# Patient Record
Sex: FEMALE | Race: WHITE | NOT HISPANIC OR LATINO | Employment: FULL TIME | URBAN - METROPOLITAN AREA
[De-identification: names, ages, dates, MRNs, and addresses within clinical notes are randomized per-mention and may not be internally consistent; named-entity substitution may affect disease eponyms.]

---

## 2017-01-04 ENCOUNTER — ALLSCRIPTS OFFICE VISIT (OUTPATIENT)
Dept: OTHER | Facility: OTHER | Age: 47
End: 2017-01-04

## 2017-04-20 ENCOUNTER — ALLSCRIPTS OFFICE VISIT (OUTPATIENT)
Dept: OTHER | Facility: OTHER | Age: 47
End: 2017-04-20

## 2017-04-20 ENCOUNTER — TRANSCRIBE ORDERS (OUTPATIENT)
Dept: ADMINISTRATIVE | Facility: HOSPITAL | Age: 47
End: 2017-04-20

## 2017-04-20 DIAGNOSIS — R07.9 CHEST PAIN: ICD-10-CM

## 2017-04-20 DIAGNOSIS — E55.9 VITAMIN D DEFICIENCY: ICD-10-CM

## 2017-04-20 DIAGNOSIS — R06.09 OTHER FORMS OF DYSPNEA: ICD-10-CM

## 2017-04-20 DIAGNOSIS — E78.5 HYPERLIPIDEMIA: ICD-10-CM

## 2017-04-20 DIAGNOSIS — R07.9 CHEST PAIN, UNSPECIFIED: Primary | ICD-10-CM

## 2017-04-20 DIAGNOSIS — R09.89 OTHER DYSPNEA AND RESPIRATORY ABNORMALITY: ICD-10-CM

## 2017-04-20 DIAGNOSIS — R06.09 OTHER DYSPNEA AND RESPIRATORY ABNORMALITY: ICD-10-CM

## 2017-04-26 ENCOUNTER — LAB REQUISITION (OUTPATIENT)
Dept: LAB | Facility: HOSPITAL | Age: 47
End: 2017-04-26
Payer: OTHER GOVERNMENT

## 2017-04-26 ENCOUNTER — LAB CONVERSION - ENCOUNTER (OUTPATIENT)
Dept: OTHER | Facility: OTHER | Age: 47
End: 2017-04-26

## 2017-04-26 DIAGNOSIS — K21.9 GASTRO-ESOPHAGEAL REFLUX DISEASE WITHOUT ESOPHAGITIS: ICD-10-CM

## 2017-04-26 PROCEDURE — 88305 TISSUE EXAM BY PATHOLOGIST: CPT | Performed by: INTERNAL MEDICINE

## 2017-05-03 ENCOUNTER — HOSPITAL ENCOUNTER (OUTPATIENT)
Dept: NON INVASIVE DIAGNOSTICS | Facility: CLINIC | Age: 47
Discharge: HOME | End: 2017-05-03
Payer: OTHER GOVERNMENT

## 2017-05-03 DIAGNOSIS — R06.09 OTHER FORMS OF DYSPNEA: ICD-10-CM

## 2017-05-03 DIAGNOSIS — R07.9 CHEST PAIN: ICD-10-CM

## 2017-05-03 PROCEDURE — 93350 STRESS TTE ONLY: CPT

## 2017-05-04 ENCOUNTER — ALLSCRIPTS OFFICE VISIT (OUTPATIENT)
Dept: OTHER | Facility: OTHER | Age: 47
End: 2017-05-04

## 2017-05-05 ENCOUNTER — ALLSCRIPTS OFFICE VISIT (OUTPATIENT)
Dept: OTHER | Facility: OTHER | Age: 47
End: 2017-05-05

## 2017-08-09 ENCOUNTER — ALLSCRIPTS OFFICE VISIT (OUTPATIENT)
Dept: OTHER | Facility: OTHER | Age: 47
End: 2017-08-09

## 2017-09-19 ENCOUNTER — GENERIC CONVERSION - ENCOUNTER (OUTPATIENT)
Dept: OTHER | Facility: OTHER | Age: 47
End: 2017-09-19

## 2018-01-02 ENCOUNTER — GENERIC CONVERSION - ENCOUNTER (OUTPATIENT)
Dept: OTHER | Facility: OTHER | Age: 48
End: 2018-01-02

## 2018-01-12 VITALS
SYSTOLIC BLOOD PRESSURE: 132 MMHG | HEIGHT: 63 IN | BODY MASS INDEX: 38.7 KG/M2 | DIASTOLIC BLOOD PRESSURE: 68 MMHG | WEIGHT: 218.38 LBS

## 2018-01-13 VITALS
TEMPERATURE: 98 F | DIASTOLIC BLOOD PRESSURE: 80 MMHG | WEIGHT: 210.13 LBS | SYSTOLIC BLOOD PRESSURE: 122 MMHG | HEIGHT: 63 IN | BODY MASS INDEX: 37.23 KG/M2

## 2018-01-13 VITALS
SYSTOLIC BLOOD PRESSURE: 124 MMHG | HEIGHT: 63 IN | BODY MASS INDEX: 37.03 KG/M2 | WEIGHT: 209 LBS | DIASTOLIC BLOOD PRESSURE: 62 MMHG

## 2018-01-13 VITALS
SYSTOLIC BLOOD PRESSURE: 120 MMHG | DIASTOLIC BLOOD PRESSURE: 70 MMHG | WEIGHT: 212.25 LBS | BODY MASS INDEX: 37.6 KG/M2 | TEMPERATURE: 98.7 F

## 2018-01-24 VITALS
TEMPERATURE: 98.6 F | HEIGHT: 63 IN | BODY MASS INDEX: 35.97 KG/M2 | DIASTOLIC BLOOD PRESSURE: 70 MMHG | SYSTOLIC BLOOD PRESSURE: 138 MMHG | WEIGHT: 203 LBS

## 2018-01-26 ENCOUNTER — OFFICE VISIT (OUTPATIENT)
Dept: FAMILY MEDICINE CLINIC | Facility: CLINIC | Age: 48
End: 2018-01-26
Payer: OTHER GOVERNMENT

## 2018-01-26 VITALS
DIASTOLIC BLOOD PRESSURE: 82 MMHG | WEIGHT: 203 LBS | BODY MASS INDEX: 35.97 KG/M2 | HEIGHT: 63 IN | TEMPERATURE: 96.7 F | SYSTOLIC BLOOD PRESSURE: 122 MMHG

## 2018-01-26 DIAGNOSIS — E78.5 HYPERLIPIDEMIA, UNSPECIFIED HYPERLIPIDEMIA TYPE: ICD-10-CM

## 2018-01-26 DIAGNOSIS — K21.9 GASTROESOPHAGEAL REFLUX DISEASE WITHOUT ESOPHAGITIS: ICD-10-CM

## 2018-01-26 DIAGNOSIS — M79.7 FIBROMYALGIA: ICD-10-CM

## 2018-01-26 DIAGNOSIS — R73.9 ELEVATED BLOOD SUGAR: Primary | ICD-10-CM

## 2018-01-26 DIAGNOSIS — R73.01 IMPAIRED FASTING GLUCOSE: ICD-10-CM

## 2018-01-26 PROBLEM — L85.3 XEROSIS OF SKIN: Status: ACTIVE | Noted: 2017-08-09

## 2018-01-26 PROBLEM — R07.9 CENTRAL CHEST PAIN: Status: ACTIVE | Noted: 2017-04-20

## 2018-01-26 PROBLEM — R06.09 DYSPNEA ON EXERTION: Status: ACTIVE | Noted: 2017-04-20

## 2018-01-26 PROBLEM — R06.00 DYSPNEA ON EXERTION: Status: ACTIVE | Noted: 2017-04-20

## 2018-01-26 LAB — SL AMB POCT HEMOGLOBIN AIC: NORMAL

## 2018-01-26 PROCEDURE — 99214 OFFICE O/P EST MOD 30 MIN: CPT | Performed by: FAMILY MEDICINE

## 2018-01-26 PROCEDURE — 83037 HB GLYCOSYLATED A1C HOME DEV: CPT | Performed by: FAMILY MEDICINE

## 2018-01-26 RX ORDER — AMMONIUM LACTATE 12 G/100G
CREAM TOPICAL 2 TIMES DAILY
COMMUNITY
Start: 2017-08-09 | End: 2020-06-24 | Stop reason: SDUPTHER

## 2018-01-26 RX ORDER — ALPRAZOLAM 0.25 MG/1
TABLET ORAL
COMMUNITY
End: 2020-06-24

## 2018-01-26 RX ORDER — MULTIVIT-MIN/IRON/FOLIC ACID/K 18-600-40
CAPSULE ORAL
COMMUNITY

## 2018-01-26 RX ORDER — INFLUENZA A VIRUS A/CHRISTCHURCH/16/2010 NIB-74 (H1N1) HEMAGGLUTININ ANTIGEN (PROPIOLACTONE INACTIVATED), INFLUENZA A VIRUS A/SWITZERLAND/9715293/2013, NIB-88 (H3N2) HEMAGGLUTININ ANTIGEN (PROPIOLACTONE INACTIVATED), INFLUENZA B VIRUS B/PHUKET/3073/2013 - WILD TYPE HEMAGGLUTININ ANTIGEN (PROPIOLACTONE INACTIVATED) 15; 15; 15 UG/.5ML; UG/.5ML; UG/.5ML
INJECTION, SUSPENSION INTRAMUSCULAR
Refills: 0 | COMMUNITY
Start: 2017-12-07 | End: 2019-04-22

## 2018-01-26 RX ORDER — ATORVASTATIN CALCIUM 10 MG/1
1 TABLET, FILM COATED ORAL
COMMUNITY
Start: 2017-05-05 | End: 2018-04-15 | Stop reason: SDUPTHER

## 2018-01-26 RX ORDER — ALBUTEROL SULFATE 90 UG/1
2 AEROSOL, METERED RESPIRATORY (INHALATION)
COMMUNITY
Start: 2017-01-04 | End: 2018-06-04

## 2018-01-26 RX ORDER — METHOCARBAMOL 500 MG/1
500 TABLET, FILM COATED ORAL EVERY 6 HOURS PRN
Refills: 0 | COMMUNITY
Start: 2018-01-06 | End: 2019-04-22

## 2018-01-26 RX ORDER — PANTOPRAZOLE SODIUM 40 MG/1
1 TABLET, DELAYED RELEASE ORAL 2 TIMES DAILY
COMMUNITY
Start: 2017-04-20 | End: 2018-02-26 | Stop reason: SDUPTHER

## 2018-01-26 RX ORDER — AMITRIPTYLINE HYDROCHLORIDE 50 MG/1
25 TABLET, FILM COATED ORAL DAILY
COMMUNITY
End: 2020-11-18 | Stop reason: SDUPTHER

## 2018-01-26 RX ORDER — B-COMPLEX WITH VITAMIN C
TABLET ORAL
COMMUNITY

## 2018-01-26 RX ORDER — FLUTICASONE PROPIONATE 50 MCG
1 SPRAY, SUSPENSION (ML) NASAL 2 TIMES DAILY
COMMUNITY
Start: 2018-01-02 | End: 2019-10-09 | Stop reason: SDUPTHER

## 2018-01-26 NOTE — PROGRESS NOTES
Assessment/Plan:    47-year-old woman with:  Impaired fasting glucose, hyperlipidemia, fibromyalgia and GERD  Continue current medications  Discussed healthy diet like the Mediterranean diet including low glycemic index diet, exercise and weight loss at length  Will refer to nutritionist to help patient with her lifestyle changes discussed supportive care return parameters  Follow-up in 6 months and will recheck A1c at that time  No problem-specific Assessment & Plan notes found for this encounter  Diagnoses and all orders for this visit:    Elevated blood sugar  -     POCT hemoglobin A1c  -     Ambulatory referral to Nutrition Services; Future    Impaired fasting glucose  -     Ambulatory referral to Nutrition Services; Future    Fibromyalgia    Gastroesophageal reflux disease without esophagitis    Hyperlipidemia, unspecified hyperlipidemia type    Other orders  -     amitriptyline (ELAVIL) 50 mg tablet; Take 25 mg by mouth daily    -     ammonium lactate (LAC-HYDRIN) 12 % cream; Apply topically Twice daily  -     atorvastatin (LIPITOR) 10 mg tablet; Take 1 tablet by mouth  -     fluticasone (FLONASE) 50 mcg/act nasal spray; 1 spray into each nostril 2 (two) times a day  -     FLUVIRIN SUSP; VACCINATION ADMINISTERED BY PHARMACIST  -     Magnesium 100 MG TABS; Take by mouth  -     methocarbamol (ROBAXIN) 500 mg tablet; Take 500 mg by mouth every 6 (six) hours as needed  -     pantoprazole (PROTONIX) 40 mg tablet; Take 1 tablet by mouth 2 (two) times a day  -     albuterol (VENTOLIN HFA) 90 mcg/act inhaler; Inhale 2 puffs  -     B Complex Vitamins (VITAMIN B COMPLEX) TABS; Take by mouth  -     Cholecalciferol (VITAMIN D) 2000 units CAPS; Take by mouth  -     Cetirizine HCl (ZYRTEC ALLERGY) 10 MG CAPS; Take by mouth  -     ALPRAZolam (XANAX) 0 25 mg tablet; Take by mouth          Subjective:      Patient ID: Beth Naranjo is a 50 y o  female      Patient is a 47-year-old woman who presents for follow-up visit  Patient admits that she has been stable with regards to her hyperlipidemia, fibromyalgia and GERD on her medications  Patient was recently told on blood work that her blood sugar was elevated  A1c was performed in the office today  Patient admits that she is trying to make lifestyle changes and lose weight  No other acute complaints at this point  Diabetes   Hypoglycemia symptoms include headaches  Headache      Urinary Frequency    Associated symptoms include frequency  The following portions of the patient's history were reviewed and updated as appropriate: allergies, current medications, past family history, past medical history, past social history, past surgical history and problem list     Review of Systems   Constitutional: Negative  HENT: Negative  Eyes: Negative  Respiratory: Negative  Cardiovascular: Negative  Gastrointestinal: Negative  Endocrine: Negative  Genitourinary: Positive for frequency  Musculoskeletal: Negative  Allergic/Immunologic: Negative  Neurological: Positive for headaches  Hematological: Negative  Psychiatric/Behavioral: Negative  All other systems reviewed and are negative  Objective:     Physical Exam   Constitutional: She is oriented to person, place, and time  She appears well-developed and well-nourished  HENT:   Head: Atraumatic  Right Ear: External ear normal    Left Ear: External ear normal    Eyes: Conjunctivae and EOM are normal  Pupils are equal, round, and reactive to light  Neck: Normal range of motion  Cardiovascular: Normal rate, regular rhythm and normal heart sounds  Pulmonary/Chest: Effort normal and breath sounds normal  No respiratory distress  Abdominal: Soft  Bowel sounds are normal  She exhibits no distension  There is no tenderness  There is no rebound and no guarding  Musculoskeletal: Normal range of motion  Neurological: She is alert and oriented to person, place, and time   No cranial nerve deficit  Skin: Skin is warm and dry  Psychiatric: She has a normal mood and affect   Her behavior is normal  Judgment and thought content normal

## 2018-02-06 ENCOUNTER — OFFICE VISIT (OUTPATIENT)
Dept: FAMILY MEDICINE CLINIC | Facility: CLINIC | Age: 48
End: 2018-02-06
Payer: OTHER GOVERNMENT

## 2018-02-06 VITALS
HEIGHT: 62 IN | BODY MASS INDEX: 36.99 KG/M2 | TEMPERATURE: 98.3 F | HEART RATE: 105 BPM | WEIGHT: 201 LBS | SYSTOLIC BLOOD PRESSURE: 130 MMHG | DIASTOLIC BLOOD PRESSURE: 88 MMHG | OXYGEN SATURATION: 99 %

## 2018-02-06 DIAGNOSIS — K21.9 GASTROESOPHAGEAL REFLUX DISEASE WITHOUT ESOPHAGITIS: Primary | ICD-10-CM

## 2018-02-06 PROCEDURE — 99213 OFFICE O/P EST LOW 20 MIN: CPT | Performed by: FAMILY MEDICINE

## 2018-02-06 RX ORDER — SUCRALFATE 1 G/1
1 TABLET ORAL 4 TIMES DAILY
Qty: 30 TABLET | Refills: 1 | Status: SHIPPED | OUTPATIENT
Start: 2018-02-06 | End: 2020-06-24

## 2018-02-06 NOTE — PROGRESS NOTES
Assessment/Plan:    25-year-old woman with:  GERD  Will give Carafate to use as needed and encouraged patient to move Protonix to 0 5 hour before breakfast daily  Patient was advised to try this for 1-2 weeks and if symptoms are not sufficient was advised to increased Protonix to twice daily  If this was not sufficient patient was advised to see her GI doctor for an EGD  Discussed supportive care return parameters  No problem-specific Assessment & Plan notes found for this encounter  Diagnoses and all orders for this visit:    Gastroesophageal reflux disease without esophagitis  -     sucralfate (CARAFATE) 1 g tablet; Take 1 tablet (1 g total) by mouth 4 (four) times a day          Subjective:   Chief Complaint   Patient presents with    Heartburn     comes and goes today the worst    Anxiety     due to heartburn        Patient ID: Darrin Weathers is a 50 y o  female  Patient is a 25-year-old woman who presents complaining of worsening of her indigestion and reflux symptoms  Patient has been taking the Protonix but takes it in the evening before bedtime  No nausea vomiting or diarrhea  Patient admits that she has eaten some more offending foods of late due to the Super Bowl  The following portions of the patient's history were reviewed and updated as appropriate: allergies, current medications, past family history, past medical history, past social history, past surgical history and problem list     Review of Systems   Constitutional: Negative  HENT: Negative  Eyes: Negative  Respiratory: Negative  Cardiovascular: Negative  Gastrointestinal: Positive for abdominal pain  Endocrine: Negative  Genitourinary: Negative  Musculoskeletal: Negative  Allergic/Immunologic: Negative  Neurological: Negative  Hematological: Negative  Psychiatric/Behavioral: Negative  All other systems reviewed and are negative          Objective:     Physical Exam   Constitutional: She is oriented to person, place, and time  She appears well-developed and well-nourished  HENT:   Head: Atraumatic  Right Ear: External ear normal    Left Ear: External ear normal    Eyes: Conjunctivae and EOM are normal  Pupils are equal, round, and reactive to light  Neck: Normal range of motion  Cardiovascular: Normal rate, regular rhythm and normal heart sounds  Pulmonary/Chest: Effort normal and breath sounds normal  No respiratory distress  Abdominal: Soft  Bowel sounds are normal  She exhibits no distension  There is no tenderness  There is no rebound and no guarding  Musculoskeletal: Normal range of motion  Neurological: She is alert and oriented to person, place, and time  No cranial nerve deficit  Skin: Skin is warm and dry  Psychiatric: She has a normal mood and affect   Her behavior is normal  Judgment and thought content normal

## 2018-02-09 ENCOUNTER — TELEPHONE (OUTPATIENT)
Dept: FAMILY MEDICINE CLINIC | Facility: CLINIC | Age: 48
End: 2018-02-09

## 2018-02-26 DIAGNOSIS — K21.9 GASTROESOPHAGEAL REFLUX DISEASE WITHOUT ESOPHAGITIS: Primary | ICD-10-CM

## 2018-02-27 RX ORDER — PANTOPRAZOLE SODIUM 40 MG/1
TABLET, DELAYED RELEASE ORAL
Qty: 30 TABLET | Refills: 0 | Status: SHIPPED | OUTPATIENT
Start: 2018-02-27 | End: 2020-06-24

## 2018-04-15 DIAGNOSIS — E78.5 HYPERLIPIDEMIA, UNSPECIFIED HYPERLIPIDEMIA TYPE: Primary | ICD-10-CM

## 2018-04-16 RX ORDER — ATORVASTATIN CALCIUM 10 MG/1
TABLET, FILM COATED ORAL
Qty: 90 TABLET | Refills: 0 | Status: SHIPPED | OUTPATIENT
Start: 2018-04-16 | End: 2018-07-15 | Stop reason: SDUPTHER

## 2018-04-30 ENCOUNTER — TELEPHONE (OUTPATIENT)
Dept: FAMILY MEDICINE CLINIC | Facility: CLINIC | Age: 48
End: 2018-04-30

## 2018-04-30 DIAGNOSIS — G47.30 SLEEP APNEA, UNSPECIFIED TYPE: Primary | ICD-10-CM

## 2018-05-01 NOTE — TELEPHONE ENCOUNTER
Spoke with PT, informed her I faxed to # she has confirmed  I will leave hard copy, she may pick it up to keep for her records

## 2018-05-31 ENCOUNTER — OFFICE VISIT (OUTPATIENT)
Dept: FAMILY MEDICINE CLINIC | Facility: CLINIC | Age: 48
End: 2018-05-31
Payer: OTHER GOVERNMENT

## 2018-05-31 VITALS
SYSTOLIC BLOOD PRESSURE: 118 MMHG | HEIGHT: 63 IN | TEMPERATURE: 97.9 F | DIASTOLIC BLOOD PRESSURE: 70 MMHG | BODY MASS INDEX: 35.44 KG/M2 | WEIGHT: 200 LBS

## 2018-05-31 DIAGNOSIS — J06.9 ACUTE URI: Primary | ICD-10-CM

## 2018-05-31 PROCEDURE — 99213 OFFICE O/P EST LOW 20 MIN: CPT | Performed by: FAMILY MEDICINE

## 2018-05-31 RX ORDER — AZITHROMYCIN 250 MG/1
TABLET, FILM COATED ORAL
Qty: 6 TABLET | Refills: 0 | Status: SHIPPED | OUTPATIENT
Start: 2018-05-31 | End: 2018-06-04

## 2018-05-31 RX ORDER — AZITHROMYCIN 250 MG/1
TABLET, FILM COATED ORAL
Qty: 6 TABLET | Refills: 0 | Status: SHIPPED | OUTPATIENT
Start: 2018-05-31 | End: 2018-05-31 | Stop reason: SDUPTHER

## 2018-05-31 NOTE — PROGRESS NOTES
Assessment/Plan:    58-year-old woman with:  Acute URI  Discussed supportive care return parameters  Will give script for Z-Jeovany to fill in case Flonase is not improving in several days  No problem-specific Assessment & Plan notes found for this encounter  Diagnoses and all orders for this visit:    Acute URI  -     azithromycin (ZITHROMAX) 250 mg tablet; Take 2 tabs on day 1 then 1 tab daily for 4 more days          Subjective:   Chief Complaint   Patient presents with    Cold Like Symptoms    Sore Throat     Started on Monday    Headache    Earache     clodded          Patient ID: Leo Jung is a 50 y o  female  Patient is a 58-year-old woman who presents complaining of five days of cough congestion a marked sinus pressure that is worsening  No fevers but chills  No nausea vomiting  Tolerating p o  intake  Patient begin Flonase but denies any improvement so far  The following portions of the patient's history were reviewed and updated as appropriate: allergies, current medications, past family history, past medical history, past social history, past surgical history and problem list     Review of Systems   Constitutional: Negative  HENT: Positive for congestion, sinus pain, sinus pressure and sore throat  Eyes: Negative  Respiratory: Positive for cough  Cardiovascular: Negative  Gastrointestinal: Negative  Endocrine: Negative  Genitourinary: Negative  Musculoskeletal: Negative  Allergic/Immunologic: Negative  Neurological: Negative  Hematological: Negative  Psychiatric/Behavioral: Negative  All other systems reviewed and are negative  Objective:      /70 (BP Location: Right arm, Patient Position: Sitting, Cuff Size: Large)   Temp 97 9 °F (36 6 °C) (Tympanic)   Ht 5' 3" (1 6 m)   Wt 90 7 kg (200 lb)   BMI 35 43 kg/m²          Physical Exam   Constitutional: She is oriented to person, place, and time   She appears well-developed and well-nourished  HENT:   Head: Atraumatic  Right Ear: External ear normal    Left Ear: External ear normal    Eyes: Conjunctivae and EOM are normal  Pupils are equal, round, and reactive to light  Neck: Normal range of motion  Cardiovascular: Normal rate, regular rhythm and normal heart sounds  Pulmonary/Chest: Effort normal and breath sounds normal  No respiratory distress  Abdominal: Soft  She exhibits no distension  There is no tenderness  There is no rebound and no guarding  Musculoskeletal: Normal range of motion  Neurological: She is alert and oriented to person, place, and time  No cranial nerve deficit  Skin: Skin is warm and dry  Psychiatric: She has a normal mood and affect   Her behavior is normal  Judgment and thought content normal

## 2018-06-04 ENCOUNTER — OFFICE VISIT (OUTPATIENT)
Dept: FAMILY MEDICINE CLINIC | Facility: CLINIC | Age: 48
End: 2018-06-04
Payer: OTHER GOVERNMENT

## 2018-06-04 VITALS
SYSTOLIC BLOOD PRESSURE: 120 MMHG | BODY MASS INDEX: 37.17 KG/M2 | TEMPERATURE: 97.9 F | WEIGHT: 202 LBS | DIASTOLIC BLOOD PRESSURE: 78 MMHG | HEIGHT: 62 IN

## 2018-06-04 DIAGNOSIS — J01.10 ACUTE NON-RECURRENT FRONTAL SINUSITIS: Primary | ICD-10-CM

## 2018-06-04 PROCEDURE — 99213 OFFICE O/P EST LOW 20 MIN: CPT | Performed by: FAMILY MEDICINE

## 2018-06-04 RX ORDER — CEFDINIR 300 MG/1
300 CAPSULE ORAL EVERY 12 HOURS SCHEDULED
Qty: 20 CAPSULE | Refills: 0 | Status: SHIPPED | OUTPATIENT
Start: 2018-06-04 | End: 2018-06-14

## 2018-06-04 NOTE — PROGRESS NOTES
Assessment/Plan:         Diagnoses and all orders for this visit:    Acute non-recurrent frontal sinusitis  -     cefdinir (OMNICEF) 300 mg capsule; Take 1 capsule (300 mg total) by mouth every 12 (twelve) hours for 10 days          Subjective:   Chief Complaint   Patient presents with    Cold Like Symptoms     coughing , sore throat , nasal congestion          Patient ID: Shefali Stoner is a 50 y o  female  Patient is here with cough, sore throat, nasal congestion for the past week  Ears feel clogged  Patient also with postnasal drip/ rhinorrhea  No fever noted  No vomiting or diarrhea  Patient had Z-Jeovany which started last Thursday  Patient also noticing glandular swelling and pain right side of neck greater than left  The following portions of the patient's history were reviewed and updated as appropriate: allergies, current medications, past family history, past medical history, past social history, past surgical history and problem list     Review of Systems   Constitutional: Negative  Negative for chills and fever  HENT: Positive for congestion, ear pain, postnasal drip, rhinorrhea, sinus pressure and sore throat  Eyes: Negative  Respiratory: Positive for cough  Cardiovascular: Negative  Gastrointestinal: Negative  Endocrine: Negative  Genitourinary: Negative  Musculoskeletal: Negative  Skin: Negative  Allergic/Immunologic: Negative  Neurological: Negative  Hematological: Negative  Psychiatric/Behavioral: Negative  Objective:      /78 (BP Location: Right arm, Patient Position: Sitting, Cuff Size: Large)   Temp 97 9 °F (36 6 °C) (Tympanic)   Ht 5' 2" (1 575 m)   Wt 91 6 kg (202 lb)   BMI 36 95 kg/m²          Physical Exam   Constitutional: She is oriented to person, place, and time  She appears well-developed and well-nourished  No distress  HENT:   Head: Normocephalic     Right Ear: External ear normal    Left Ear: External ear normal  Mouth/Throat: Oropharyngeal exudate present  Eyes: EOM are normal  Pupils are equal, round, and reactive to light  Right eye exhibits no discharge  Left eye exhibits no discharge  No scleral icterus  Neck: Normal range of motion  Neck supple  No thyromegaly present  Cardiovascular: Normal rate, regular rhythm, normal heart sounds and intact distal pulses  Exam reveals no gallop and no friction rub  No murmur heard  Pulmonary/Chest: Effort normal and breath sounds normal  No respiratory distress  She has no wheezes  She has no rales  She exhibits no tenderness  Abdominal: Soft  Bowel sounds are normal  She exhibits no distension  There is no tenderness  There is no rebound and no guarding  Musculoskeletal: Normal range of motion  She exhibits no edema or tenderness  Lymphadenopathy:     She has cervical adenopathy  Neurological: She is oriented to person, place, and time  No cranial nerve deficit  She exhibits normal muscle tone  Coordination normal    Skin: Skin is warm and dry  No rash noted  She is not diaphoretic  No erythema  No pallor  Psychiatric: She has a normal mood and affect  Her behavior is normal  Judgment and thought content normal    Nursing note and vitals reviewed

## 2018-07-15 DIAGNOSIS — E78.5 HYPERLIPIDEMIA, UNSPECIFIED HYPERLIPIDEMIA TYPE: ICD-10-CM

## 2018-07-16 ENCOUNTER — TELEPHONE (OUTPATIENT)
Dept: FAMILY MEDICINE CLINIC | Facility: CLINIC | Age: 48
End: 2018-07-16

## 2018-07-17 RX ORDER — ATORVASTATIN CALCIUM 10 MG/1
TABLET, FILM COATED ORAL
Qty: 90 TABLET | Refills: 0 | Status: SHIPPED | OUTPATIENT
Start: 2018-07-17 | End: 2018-10-13 | Stop reason: SDUPTHER

## 2018-10-13 DIAGNOSIS — E78.5 HYPERLIPIDEMIA, UNSPECIFIED HYPERLIPIDEMIA TYPE: ICD-10-CM

## 2018-10-15 RX ORDER — ATORVASTATIN CALCIUM 10 MG/1
TABLET, FILM COATED ORAL
Qty: 90 TABLET | Refills: 0 | Status: SHIPPED | OUTPATIENT
Start: 2018-10-15 | End: 2019-01-13 | Stop reason: SDUPTHER

## 2018-10-31 ENCOUNTER — OFFICE VISIT (OUTPATIENT)
Dept: FAMILY MEDICINE CLINIC | Facility: CLINIC | Age: 48
End: 2018-10-31
Payer: OTHER GOVERNMENT

## 2018-10-31 VITALS
DIASTOLIC BLOOD PRESSURE: 84 MMHG | SYSTOLIC BLOOD PRESSURE: 122 MMHG | BODY MASS INDEX: 35.7 KG/M2 | WEIGHT: 194 LBS | TEMPERATURE: 96.6 F | HEIGHT: 62 IN

## 2018-10-31 DIAGNOSIS — M79.7 FIBROMYALGIA: ICD-10-CM

## 2018-10-31 DIAGNOSIS — J06.9 ACUTE UPPER RESPIRATORY INFECTION: Primary | ICD-10-CM

## 2018-10-31 DIAGNOSIS — E55.9 VITAMIN D DEFICIENCY: ICD-10-CM

## 2018-10-31 DIAGNOSIS — E78.5 HYPERLIPIDEMIA, UNSPECIFIED HYPERLIPIDEMIA TYPE: ICD-10-CM

## 2018-10-31 DIAGNOSIS — R73.01 IMPAIRED FASTING GLUCOSE: ICD-10-CM

## 2018-10-31 PROCEDURE — 99214 OFFICE O/P EST MOD 30 MIN: CPT | Performed by: FAMILY MEDICINE

## 2018-10-31 RX ORDER — FLUTICASONE PROPIONATE 50 MCG
2 SPRAY, SUSPENSION (ML) NASAL DAILY
Qty: 16 G | Refills: 0 | Status: SHIPPED | OUTPATIENT
Start: 2018-10-31 | End: 2019-04-17 | Stop reason: SDUPTHER

## 2018-10-31 RX ORDER — AZITHROMYCIN 250 MG/1
TABLET, FILM COATED ORAL
Qty: 6 TABLET | Refills: 0 | Status: SHIPPED | OUTPATIENT
Start: 2018-10-31 | End: 2018-11-05

## 2018-11-01 NOTE — PROGRESS NOTES
Assessment/Plan:    52-year-old woman with:  Acute URI,  impaired fasting glucose, fibromyalgia, hyperlipidemia and vitamin-D deficiency  Discussed supportive care return parameters  Continue current medications  Will give Flonase and Z-Jeovany fell case symptoms not improving  Will check fasting blood work and call patient with results  Follow-up in 6 months  No problem-specific Assessment & Plan notes found for this encounter  Diagnoses and all orders for this visit:    Acute upper respiratory infection  -     fluticasone (FLONASE) 50 mcg/act nasal spray; 2 sprays into each nostril daily  -     azithromycin (ZITHROMAX) 250 mg tablet; Take 2 tabs on day 1 then 1 tab daily 4 more days  -     CBC and differential; Future  -     Comprehensive metabolic panel; Future  -     TSH, 3rd generation with Free T4 reflex; Future  -     Lipid Panel with Direct LDL reflex; Future  -     Microalbumin / creatinine urine ratio  -     Urinalysis with reflex to microscopic  -     Hemoglobin A1C; Future  -     Vitamin D 25 hydroxy; Future    Impaired fasting glucose  -     CBC and differential; Future  -     Comprehensive metabolic panel; Future  -     TSH, 3rd generation with Free T4 reflex; Future  -     Lipid Panel with Direct LDL reflex; Future  -     Microalbumin / creatinine urine ratio  -     Urinalysis with reflex to microscopic  -     Hemoglobin A1C; Future  -     Vitamin D 25 hydroxy; Future    Fibromyalgia  -     CBC and differential; Future  -     Comprehensive metabolic panel; Future  -     TSH, 3rd generation with Free T4 reflex; Future  -     Lipid Panel with Direct LDL reflex; Future  -     Microalbumin / creatinine urine ratio  -     Urinalysis with reflex to microscopic  -     Hemoglobin A1C; Future  -     Vitamin D 25 hydroxy; Future    Hyperlipidemia, unspecified hyperlipidemia type  -     CBC and differential; Future  -     Comprehensive metabolic panel;  Future  -     TSH, 3rd generation with Free T4 reflex; Future  -     Lipid Panel with Direct LDL reflex; Future  -     Microalbumin / creatinine urine ratio  -     Urinalysis with reflex to microscopic  -     Hemoglobin A1C; Future  -     Vitamin D 25 hydroxy; Future    Vitamin D deficiency  -     CBC and differential; Future  -     Comprehensive metabolic panel; Future  -     TSH, 3rd generation with Free T4 reflex; Future  -     Lipid Panel with Direct LDL reflex; Future  -     Microalbumin / creatinine urine ratio  -     Urinalysis with reflex to microscopic  -     Hemoglobin A1C; Future  -     Vitamin D 25 hydroxy; Future          Subjective:   Chief Complaint   Patient presents with    Sinusitis     Started last week          Patient ID: Edith Thorne is a 46 y o  female  Patient is a 64-year-old woman who presents complaining of several days of cough congestion and sinus pressure  No fevers chills nausea vomiting  Tolerating p o  intake  Patient also has impaired fasting glucose, fibromyalgia, hyperlipidemia and vitamin-D deficiency  Patient admits being stable on her medications denies acute complaints at this time  She is due for fasting blood work  Sinusitis   Associated symptoms include congestion, coughing, sinus pressure and a sore throat  The following portions of the patient's history were reviewed and updated as appropriate: allergies, current medications, past family history, past medical history, past social history, past surgical history and problem list     Review of Systems   Constitutional: Negative  HENT: Positive for congestion, sinus pressure and sore throat  Eyes: Negative  Respiratory: Positive for cough  Cardiovascular: Negative  Gastrointestinal: Negative  Endocrine: Negative  Genitourinary: Negative  Musculoskeletal: Negative  Allergic/Immunologic: Negative  Neurological: Negative  Hematological: Negative  Psychiatric/Behavioral: Negative      All other systems reviewed and are negative  Objective:      /84 (BP Location: Right arm, Patient Position: Sitting, Cuff Size: Standard)   Temp (!) 96 6 °F (35 9 °C) (Tympanic)   Ht 5' 2" (1 575 m)   Wt 88 kg (194 lb)   BMI 35 48 kg/m²          Physical Exam   Constitutional: She is oriented to person, place, and time  She appears well-developed and well-nourished  HENT:   Head: Atraumatic  Right Ear: External ear normal    Left Ear: External ear normal    Mucus and edema in the nasal mucosa   Eyes: Pupils are equal, round, and reactive to light  Conjunctivae and EOM are normal    Neck: Normal range of motion  Cardiovascular: Normal rate, regular rhythm and normal heart sounds  Pulmonary/Chest: Effort normal and breath sounds normal  No respiratory distress  Abdominal: Soft  She exhibits no distension  There is no tenderness  There is no rebound and no guarding  Musculoskeletal: Normal range of motion  Neurological: She is alert and oriented to person, place, and time  No cranial nerve deficit  Skin: Skin is warm and dry  Psychiatric: She has a normal mood and affect   Her behavior is normal  Judgment and thought content normal

## 2018-11-26 ENCOUNTER — TELEPHONE (OUTPATIENT)
Dept: FAMILY MEDICINE CLINIC | Facility: CLINIC | Age: 48
End: 2018-11-26

## 2018-11-26 NOTE — TELEPHONE ENCOUNTER
PATIENT WOULD LIKE TO KNOW IF YOU WANT HER ON A GLUCOSE REGIMENT? HER  LAB SHOWS: 121  AND DOES SHE NEED TO GO ON A HIGHER CHOL MEDICATION

## 2018-11-27 NOTE — TELEPHONE ENCOUNTER
Please call patient to discuss that her labs show borderline blood sugar and cholesterol  No need for medication at this time urgently but encouraged healthy diet like the Mediterranean diet, exercise, healthy weight as tolerated and will discuss more fully at her next follow-up visit

## 2019-01-13 DIAGNOSIS — E78.5 HYPERLIPIDEMIA, UNSPECIFIED HYPERLIPIDEMIA TYPE: ICD-10-CM

## 2019-01-15 RX ORDER — ATORVASTATIN CALCIUM 10 MG/1
TABLET, FILM COATED ORAL
Qty: 90 TABLET | Refills: 0 | Status: SHIPPED | OUTPATIENT
Start: 2019-01-15 | End: 2019-04-13 | Stop reason: SDUPTHER

## 2019-04-10 DIAGNOSIS — G47.33 OBSTRUCTIVE SLEEP APNEA SYNDROME: Primary | ICD-10-CM

## 2019-04-13 DIAGNOSIS — E78.5 HYPERLIPIDEMIA, UNSPECIFIED HYPERLIPIDEMIA TYPE: ICD-10-CM

## 2019-04-16 RX ORDER — ATORVASTATIN CALCIUM 10 MG/1
TABLET, FILM COATED ORAL
Qty: 90 TABLET | Refills: 0 | Status: SHIPPED | OUTPATIENT
Start: 2019-04-16 | End: 2019-07-14 | Stop reason: SDUPTHER

## 2019-04-17 ENCOUNTER — OFFICE VISIT (OUTPATIENT)
Dept: FAMILY MEDICINE CLINIC | Facility: CLINIC | Age: 49
End: 2019-04-17
Payer: OTHER GOVERNMENT

## 2019-04-17 VITALS
DIASTOLIC BLOOD PRESSURE: 78 MMHG | SYSTOLIC BLOOD PRESSURE: 120 MMHG | WEIGHT: 198 LBS | HEIGHT: 62 IN | BODY MASS INDEX: 36.44 KG/M2 | TEMPERATURE: 98.4 F

## 2019-04-17 DIAGNOSIS — G47.30 SLEEP APNEA, UNSPECIFIED TYPE: ICD-10-CM

## 2019-04-17 DIAGNOSIS — E66.09 CLASS 2 OBESITY DUE TO EXCESS CALORIES WITHOUT SERIOUS COMORBIDITY WITH BODY MASS INDEX (BMI) OF 36.0 TO 36.9 IN ADULT: ICD-10-CM

## 2019-04-17 DIAGNOSIS — A08.4 VIRAL GASTROENTERITIS: ICD-10-CM

## 2019-04-17 DIAGNOSIS — Z12.11 ENCOUNTER FOR SCREENING FECAL OCCULT BLOOD TESTING: Primary | ICD-10-CM

## 2019-04-17 DIAGNOSIS — K21.9 GASTROESOPHAGEAL REFLUX DISEASE WITHOUT ESOPHAGITIS: ICD-10-CM

## 2019-04-17 PROBLEM — E66.812 CLASS 2 OBESITY DUE TO EXCESS CALORIES WITHOUT SERIOUS COMORBIDITY WITH BODY MASS INDEX (BMI) OF 36.0 TO 36.9 IN ADULT: Status: ACTIVE | Noted: 2019-04-17

## 2019-04-17 PROCEDURE — 99214 OFFICE O/P EST MOD 30 MIN: CPT | Performed by: FAMILY MEDICINE

## 2019-04-17 RX ORDER — ONDANSETRON 4 MG/1
4 TABLET, ORALLY DISINTEGRATING ORAL EVERY 8 HOURS PRN
Qty: 30 TABLET | Refills: 0 | Status: SHIPPED | OUTPATIENT
Start: 2019-04-17 | End: 2020-10-13

## 2019-04-17 RX ORDER — PROMETHAZINE HYDROCHLORIDE 25 MG/1
25 TABLET ORAL EVERY 6 HOURS PRN
Qty: 30 TABLET | Refills: 0 | Status: SHIPPED | OUTPATIENT
Start: 2019-04-17 | End: 2020-06-24

## 2019-04-22 ENCOUNTER — OFFICE VISIT (OUTPATIENT)
Dept: FAMILY MEDICINE CLINIC | Facility: CLINIC | Age: 49
End: 2019-04-22
Payer: OTHER GOVERNMENT

## 2019-04-22 VITALS
OXYGEN SATURATION: 98 % | SYSTOLIC BLOOD PRESSURE: 110 MMHG | HEART RATE: 100 BPM | WEIGHT: 201 LBS | BODY MASS INDEX: 36.99 KG/M2 | HEIGHT: 62 IN | TEMPERATURE: 96.6 F | RESPIRATION RATE: 16 BRPM | DIASTOLIC BLOOD PRESSURE: 70 MMHG

## 2019-04-22 DIAGNOSIS — R10.32 LEFT LOWER QUADRANT PAIN: Primary | ICD-10-CM

## 2019-04-22 PROCEDURE — 99214 OFFICE O/P EST MOD 30 MIN: CPT | Performed by: FAMILY MEDICINE

## 2019-04-24 ENCOUNTER — HOSPITAL ENCOUNTER (OUTPATIENT)
Dept: ULTRASOUND IMAGING | Facility: HOSPITAL | Age: 49
Discharge: HOME | End: 2019-04-24
Payer: OTHER GOVERNMENT

## 2019-04-24 DIAGNOSIS — R10.32 LEFT LOWER QUADRANT PAIN: ICD-10-CM

## 2019-04-24 PROCEDURE — 76700 US EXAM ABDOM COMPLETE: CPT

## 2019-04-26 ENCOUNTER — TELEPHONE (OUTPATIENT)
Dept: FAMILY MEDICINE CLINIC | Facility: CLINIC | Age: 49
End: 2019-04-26

## 2019-05-06 ENCOUNTER — TELEPHONE (OUTPATIENT)
Dept: FAMILY MEDICINE CLINIC | Facility: CLINIC | Age: 49
End: 2019-05-06

## 2019-05-08 ENCOUNTER — OFFICE VISIT (OUTPATIENT)
Dept: FAMILY MEDICINE CLINIC | Facility: CLINIC | Age: 49
End: 2019-05-08
Payer: OTHER GOVERNMENT

## 2019-05-08 VITALS
HEIGHT: 62 IN | TEMPERATURE: 96.5 F | BODY MASS INDEX: 36.07 KG/M2 | SYSTOLIC BLOOD PRESSURE: 120 MMHG | WEIGHT: 196 LBS | DIASTOLIC BLOOD PRESSURE: 84 MMHG

## 2019-05-08 DIAGNOSIS — R73.9 ELEVATED BLOOD SUGAR: ICD-10-CM

## 2019-05-08 DIAGNOSIS — K76.0 HEPATIC STEATOSIS: ICD-10-CM

## 2019-05-08 DIAGNOSIS — M25.562 ACUTE PAIN OF LEFT KNEE: Primary | ICD-10-CM

## 2019-05-08 LAB — SL AMB POCT HEMOGLOBIN AIC: 5.7 (ref ?–6.5)

## 2019-05-08 PROCEDURE — 83036 HEMOGLOBIN GLYCOSYLATED A1C: CPT | Performed by: FAMILY MEDICINE

## 2019-05-08 PROCEDURE — 99214 OFFICE O/P EST MOD 30 MIN: CPT | Performed by: FAMILY MEDICINE

## 2019-05-09 PROBLEM — K76.0 HEPATIC STEATOSIS: Status: ACTIVE | Noted: 2019-05-09

## 2019-05-09 PROBLEM — R73.9 ELEVATED BLOOD SUGAR: Status: ACTIVE | Noted: 2019-05-09

## 2019-05-09 PROCEDURE — 20610 DRAIN/INJ JOINT/BURSA W/O US: CPT | Performed by: FAMILY MEDICINE

## 2019-05-09 RX ORDER — LIDOCAINE HYDROCHLORIDE 10 MG/ML
4 INJECTION, SOLUTION INFILTRATION; PERINEURAL
Status: COMPLETED | OUTPATIENT
Start: 2019-05-09 | End: 2019-05-09

## 2019-05-09 RX ORDER — METHYLPREDNISOLONE ACETATE 40 MG/ML
1 INJECTION, SUSPENSION INTRA-ARTICULAR; INTRALESIONAL; INTRAMUSCULAR; SOFT TISSUE
Status: COMPLETED | OUTPATIENT
Start: 2019-05-09 | End: 2019-05-09

## 2019-05-09 RX ADMIN — METHYLPREDNISOLONE ACETATE 1 ML: 40 INJECTION, SUSPENSION INTRA-ARTICULAR; INTRALESIONAL; INTRAMUSCULAR; SOFT TISSUE at 09:49

## 2019-05-09 RX ADMIN — LIDOCAINE HYDROCHLORIDE 4 ML: 10 INJECTION, SOLUTION INFILTRATION; PERINEURAL at 09:49

## 2019-07-08 ENCOUNTER — TELEPHONE (OUTPATIENT)
Dept: FAMILY MEDICINE CLINIC | Facility: CLINIC | Age: 49
End: 2019-07-08

## 2019-07-08 NOTE — TELEPHONE ENCOUNTER
We received a 3rd request from [facility] for additional codes regarding the Urine Culture

## 2019-07-08 NOTE — TELEPHONE ENCOUNTER
I reached out to [facility]  I spoke with Sarabjit  I verified with her that the previous codes were received and submitted  She was able to confirm this and that insurance still kept bouncing it back  I provided Sarabjit with one last and final code: R31 [id number]  She submitted that and it appears insurance took that particular code  She assured me I should not be receiving any other requests for codes

## 2019-07-14 DIAGNOSIS — E78.5 HYPERLIPIDEMIA, UNSPECIFIED HYPERLIPIDEMIA TYPE: ICD-10-CM

## 2019-07-16 RX ORDER — ATORVASTATIN CALCIUM 10 MG/1
TABLET, FILM COATED ORAL
Qty: 90 TABLET | Refills: 0 | Status: SHIPPED | OUTPATIENT
Start: 2019-07-16 | End: 2019-10-13 | Stop reason: SDUPTHER

## 2019-10-09 ENCOUNTER — OFFICE VISIT (OUTPATIENT)
Dept: FAMILY MEDICINE CLINIC | Facility: CLINIC | Age: 49
End: 2019-10-09
Payer: OTHER GOVERNMENT

## 2019-10-09 VITALS
DIASTOLIC BLOOD PRESSURE: 68 MMHG | BODY MASS INDEX: 38.09 KG/M2 | HEIGHT: 62 IN | HEART RATE: 64 BPM | SYSTOLIC BLOOD PRESSURE: 132 MMHG | OXYGEN SATURATION: 98 % | WEIGHT: 207 LBS

## 2019-10-09 DIAGNOSIS — J01.90 ACUTE SINUSITIS, RECURRENCE NOT SPECIFIED, UNSPECIFIED LOCATION: Primary | ICD-10-CM

## 2019-10-09 DIAGNOSIS — H81.11 BENIGN PAROXYSMAL POSITIONAL VERTIGO OF RIGHT EAR: ICD-10-CM

## 2019-10-09 PROCEDURE — 99213 OFFICE O/P EST LOW 20 MIN: CPT | Performed by: FAMILY MEDICINE

## 2019-10-09 RX ORDER — OMEPRAZOLE 40 MG/1
CAPSULE, DELAYED RELEASE ORAL
COMMUNITY
Start: 2019-09-26 | End: 2022-03-31

## 2019-10-09 RX ORDER — AZITHROMYCIN 250 MG/1
TABLET, FILM COATED ORAL
Qty: 6 TABLET | Refills: 0 | Status: SHIPPED | OUTPATIENT
Start: 2019-10-09 | End: 2019-10-14

## 2019-10-09 RX ORDER — FLUTICASONE PROPIONATE 50 MCG
1 SPRAY, SUSPENSION (ML) NASAL 2 TIMES DAILY
Qty: 1 BOTTLE | Refills: 0 | Status: SHIPPED | OUTPATIENT
Start: 2019-10-09

## 2019-10-09 NOTE — PROGRESS NOTES
Assessment/Plan:    22-year-old woman with:  Acute sinusitis and BPPV  Will begin Flonase and Z-Jeovany and refer for Eppley's maneuvers and advised to call back if not improving or worsening    No problem-specific Assessment & Plan notes found for this encounter  Diagnoses and all orders for this visit:    Acute sinusitis, recurrence not specified, unspecified location  -     fluticasone (FLONASE) 50 mcg/act nasal spray; 1 spray into each nostril 2 (two) times a day  -     azithromycin (ZITHROMAX) 250 mg tablet; Take 2 tablets (500 mg total) by mouth daily for 1 day, THEN 1 tablet (250 mg total) daily for 4 days  Benign paroxysmal positional vertigo of right ear  -     Ambulatory referral to Physical Therapy; Future    Other orders  -     omeprazole (PriLOSEC) 40 MG capsule          Subjective:     Chief Complaint   Patient presents with    Dizziness     pt reports having dizziness when laying down only on right side  past 2 weeks  Patient ID: Padma Andersen is a 44 y o  female  Patient is a 22-year-old woman who presents complaining of some cough congestion and sinus pressure along with right ear symptoms and vertigo but is similar to a prior episode she had several years ago BPPV  No fevers chills nausea vomiting  Tolerating p o  Intake  No head trauma      The following portions of the patient's history were reviewed and updated as appropriate: allergies, current medications, past family history, past medical history, past social history, past surgical history and problem list     Review of Systems   Constitutional: Negative  HENT: Positive for congestion and sinus pressure  Eyes: Negative  Respiratory: Positive for cough  Cardiovascular: Negative  Gastrointestinal: Negative  Endocrine: Negative  Genitourinary: Negative  Musculoskeletal: Negative  Allergic/Immunologic: Negative  Neurological: Positive for dizziness  Hematological: Negative  Psychiatric/Behavioral: Negative  All other systems reviewed and are negative  Objective:      /68 (BP Location: Right arm, Patient Position: Sitting, Cuff Size: Standard)   Pulse 64   Ht 5' 2" (1 575 m)   Wt 93 9 kg (207 lb)   LMP 01/12/2018 (Exact Date)   SpO2 98%   BMI 37 86 kg/m²          Physical Exam   Constitutional: She is oriented to person, place, and time  She appears well-developed and well-nourished  HENT:   Head: Atraumatic  Right Ear: External ear normal    Left Ear: External ear normal    Eyes: Pupils are equal, round, and reactive to light  Conjunctivae and EOM are normal    Neck: Normal range of motion  Cardiovascular: Normal rate, regular rhythm and normal heart sounds  Pulmonary/Chest: Effort normal and breath sounds normal  No respiratory distress  Abdominal: Soft  She exhibits no distension  There is no tenderness  There is no rebound and no guarding  Musculoskeletal: Normal range of motion  Neurological: She is alert and oriented to person, place, and time  No cranial nerve deficit  Positive Ron-Hallpike   Skin: Skin is warm and dry  Psychiatric: She has a normal mood and affect   Her behavior is normal  Judgment and thought content normal

## 2019-10-13 DIAGNOSIS — E78.5 HYPERLIPIDEMIA, UNSPECIFIED HYPERLIPIDEMIA TYPE: ICD-10-CM

## 2019-10-15 ENCOUNTER — EVALUATION (OUTPATIENT)
Dept: PHYSICAL THERAPY | Facility: REHABILITATION | Age: 49
End: 2019-10-15
Payer: OTHER GOVERNMENT

## 2019-10-15 DIAGNOSIS — H81.11 BENIGN PAROXYSMAL POSITIONAL VERTIGO OF RIGHT EAR: Primary | ICD-10-CM

## 2019-10-15 PROCEDURE — 97162 PT EVAL MOD COMPLEX 30 MIN: CPT

## 2019-10-15 PROCEDURE — 97112 NEUROMUSCULAR REEDUCATION: CPT

## 2019-10-15 RX ORDER — ATORVASTATIN CALCIUM 10 MG/1
TABLET, FILM COATED ORAL
Qty: 90 TABLET | Refills: 4 | Status: SHIPPED | OUTPATIENT
Start: 2019-10-15 | End: 2021-01-06

## 2019-10-15 NOTE — PROGRESS NOTES
INITIAL EVALUATION      Date: 10/15/2019  Name Wynonia Councilman  : 1970  MRN: [id number]  Home:[phone] (home)   Mobile: [phone] (mobile)  Insurance Information: Payor: JOSEFINA EVANS / Plan:   / Product Type: Govt Unknown Not Listed /   Referring Provider: Petrona Fowler MD    Subjective    HPI: Wynonia Councilman is a 44 y o  female referred to outpatient physical therapy for   1  Benign paroxysmal positional vertigo of right ear         Patient reports approxiately three weeks ago developed episodes of room spinning which lasts less than a minute  Symptoms occurs when supine to sit, bending down to pick objects from floor, standing too quickly,  walking in busy environment  She has not had any falls as a resulty of symptoms  Patient reports has taken zofran 4mg with good results  Patient reports sinus infection, recently completed  Course of antibiotic    Home Environment: Lives with , daughter and two cats  2 story home,  Bilevel  Reports stair negotiation with 1 rail   Headaches:  Started last week;  Left temporal headache;  Best; 1/10  Current: 2/10  Worse: 5/10    Patient Goal: "to stop that spinning"    Dysequilibrium: No  Lightheadedness: No  Vertigo: Yes  Rocking or Swaying: No         Diplopia: No  Motion sickness: Yes  Floating, Swimming, Disconnected: No    Exacerbation Factors:  Bending over: Yes  Turning Head: No  Rolling in bed: Yes  Walking: Yes  Looking up: Yes  Supine to/from sitting: Yes  Optokinetic movement: No  Walking in busy environment: Yes     Concurrent Complaints:  Tinnitus:No  Aural Fullness: Yes  Known hearing loss:No  Nausea, Vomiting: No  Altered Vision: No  Poor Concentration: No  Memory Loss: No  Peripheral Neuropathy:No    Objective    Reflexes:     - Biceps 2+    -Triceps 2+     - Patellar  2+       Finger to nose: normal  Dysdiadochokinesia: normal    Resting Nystagmus: no  Gaze holding nystagmus No   Smooth Pursuit: normal  Vertical Saccades: normal  Horizontal Saccades: normal with increased 7/10 pain  5/10 spining  Convergence: 10, 8, 8  Cover/Uncover Test: normal  DVA: positive     - Static Head: 20/16     - Dynamic Head:20/125    R Jennings-Hallpike: positive  geotrophy 4 beating delay of 7 seconds, lasting for 15 seconds 5/10 room spinning  L Jennings-Hallpike: negative  R Roll Test:  negative  L Roll Test:  negative  R Head Thrust: negative  L Head Thrust: negative    OUTCOME MEASURES:  MCSTIB:    - EO firm:     30    - EC firm:     24    - EO foam:  15    - EC foam:  10                     79/120      Functional Gait Assessment  3Gait level surface  3Change in gait speed  2Gait with horizontal head turns  2Gait with vertical head turns  3Gait and pivot turn  3Step over obstacle  2Gait with narrow base of support  2Gait with eyes closed  2Ambulating backwards  2Steps    22/30 Total score    Epley; I feel like wall has been moved away  Reported considerable reduction in symptoms to 1-2/10 post repeat Epley    Assessment/Plan    Assessment:  Patient presents to outpatient physical therapy with sx consistent with Right BPPV  Client reports has had BPPV about 5 years ago which resolved with intervention  Her  impairments include feeling of room spinning with sine to sit, reaching for objects on floor, walking in busy environments, decreased static and dynamic balance, increased risk of falls and difficulty completing job related activities as a  at [employer]  These impairments are currently limiting the patient's ability to perform activities of daily living without increased head ache ache and vertigo  Patient has also been having difficulty sleeping at night due increased symptoms with rolling/chnage in position  Patient will benefit from skilled outpatient physical therapy to address the above impairments in order to improve patient's QOL       STG's:     - Patient improves EC on foam task  by 10 sec of MCSTIB for improved static balance within 2 weeks    - Patient is independent with initial home exercise program for improvements at home within 2 weeks    - Patient will be able to sleep 4 consecutive hours/night     - Patient will be able to work without increased symptoms 4 consecutive hours  LTG's:   - Patient improves on all tasks to 110/120 of the MCSTIB for improved static balance within 4 weeks   - Patient improves score on FGA  By 5 pointsfor improved dynamic balance within 4 weeks  -  Patient will be able to work for 8 hours without increased vertigo symptoms      Plan:  Patient will benefit from skilled outpatient physical therapy 1-2x/wk for 4 wk  Therapeutic exercises, Neuromuscular re-education to include vestibular intervetnion and therapuetic activities  Precautions: anxiety, neck pain       Manual                                                     Exercise Diary  10/15       EPLEY Maneuver R   X 2        Static Balance Firm  FTEO  FTEO  Foam   FTEO  FTEC                 :30 x 1       Dynamic Balance  Componenets for FGA       HEP Written recanalization HEP demonstrated, performed and issued to client                                                                   Modalities                                      Client did not attend further PT intervention  Episode has been discharged

## 2019-10-16 ENCOUNTER — APPOINTMENT (OUTPATIENT)
Dept: PHYSICAL THERAPY | Facility: REHABILITATION | Age: 49
End: 2019-10-16
Payer: OTHER GOVERNMENT

## 2019-11-21 ENCOUNTER — HOSPITAL ENCOUNTER (EMERGENCY)
Facility: HOSPITAL | Age: 49
Discharge: HOME | End: 2019-11-21
Attending: EMERGENCY MEDICINE | Admitting: EMERGENCY MEDICINE
Payer: OTHER GOVERNMENT

## 2019-11-21 ENCOUNTER — APPOINTMENT (EMERGENCY)
Dept: CT IMAGING | Facility: HOSPITAL | Age: 49
End: 2019-11-21
Payer: OTHER GOVERNMENT

## 2019-11-21 VITALS
RESPIRATION RATE: 16 BRPM | DIASTOLIC BLOOD PRESSURE: 72 MMHG | TEMPERATURE: 97.6 F | HEART RATE: 105 BPM | SYSTOLIC BLOOD PRESSURE: 125 MMHG | BODY MASS INDEX: 37.78 KG/M2 | OXYGEN SATURATION: 100 % | WEIGHT: 206.57 LBS

## 2019-11-21 DIAGNOSIS — F41.8 ANXIETY ABOUT HEALTH: ICD-10-CM

## 2019-11-21 DIAGNOSIS — R07.9 RIGHT-SIDED CHEST PAIN: Primary | ICD-10-CM

## 2019-11-21 DIAGNOSIS — R07.81 PLEURITIC CHEST PAIN: ICD-10-CM

## 2019-11-21 DIAGNOSIS — R91.1 LEFT LOWER LOBE PULMONARY NODULE: ICD-10-CM

## 2019-11-21 LAB
ALBUMIN SERPL BCP-MCNC: 3.8 G/DL (ref 3.5–5)
ALP SERPL-CCNC: 114 U/L (ref 46–116)
ALT SERPL W P-5'-P-CCNC: 47 U/L (ref 12–78)
ANION GAP SERPL CALCULATED.3IONS-SCNC: 7 MMOL/L (ref 4–13)
APTT PPP: 31 SECONDS (ref 23–37)
AST SERPL W P-5'-P-CCNC: 23 U/L (ref 5–45)
ATRIAL RATE: 93 BPM
BASOPHILS # BLD AUTO: 0.03 THOUSANDS/ΜL (ref 0–0.1)
BASOPHILS NFR BLD AUTO: 0 % (ref 0–1)
BILIRUB SERPL-MCNC: 0.55 MG/DL (ref 0.2–1)
BUN SERPL-MCNC: 13 MG/DL (ref 5–25)
CALCIUM SERPL-MCNC: 9.4 MG/DL (ref 8.3–10.1)
CHLORIDE SERPL-SCNC: 103 MMOL/L (ref 100–108)
CO2 SERPL-SCNC: 30 MMOL/L (ref 21–32)
CREAT SERPL-MCNC: 0.82 MG/DL (ref 0.6–1.3)
EOSINOPHIL # BLD AUTO: 0.29 THOUSAND/ΜL (ref 0–0.61)
EOSINOPHIL NFR BLD AUTO: 4 % (ref 0–6)
ERYTHROCYTE [DISTWIDTH] IN BLOOD BY AUTOMATED COUNT: 13.1 % (ref 11.6–15.1)
GFR SERPL CREATININE-BSD FRML MDRD: 83 ML/MIN/1.73SQ M
GLUCOSE SERPL-MCNC: 122 MG/DL (ref 65–140)
HCT VFR BLD AUTO: 42.5 % (ref 34.8–46.1)
HGB BLD-MCNC: 13.6 G/DL (ref 11.5–15.4)
IMM GRANULOCYTES # BLD AUTO: 0.02 THOUSAND/UL (ref 0–0.2)
IMM GRANULOCYTES NFR BLD AUTO: 0 % (ref 0–2)
INR PPP: 0.85 (ref 0.84–1.19)
LYMPHOCYTES # BLD AUTO: 2.02 THOUSANDS/ΜL (ref 0.6–4.47)
LYMPHOCYTES NFR BLD AUTO: 26 % (ref 14–44)
MCH RBC QN AUTO: 29.6 PG (ref 26.8–34.3)
MCHC RBC AUTO-ENTMCNC: 32 G/DL (ref 31.4–37.4)
MCV RBC AUTO: 92 FL (ref 82–98)
MONOCYTES # BLD AUTO: 0.41 THOUSAND/ΜL (ref 0.17–1.22)
MONOCYTES NFR BLD AUTO: 5 % (ref 4–12)
NEUTROPHILS # BLD AUTO: 5 THOUSANDS/ΜL (ref 1.85–7.62)
NEUTS SEG NFR BLD AUTO: 65 % (ref 43–75)
NRBC BLD AUTO-RTO: 0 /100 WBCS
P AXIS: 63 DEGREES
PLATELET # BLD AUTO: 299 THOUSANDS/UL (ref 149–390)
PMV BLD AUTO: 10.3 FL (ref 8.9–12.7)
POTASSIUM SERPL-SCNC: 3.9 MMOL/L (ref 3.5–5.3)
PR INTERVAL: 140 MS
PROT SERPL-MCNC: 7.9 G/DL (ref 6.4–8.2)
PROTHROMBIN TIME: 11.7 SECONDS (ref 11.6–14.5)
QRS AXIS: 15 DEGREES
QRSD INTERVAL: 76 MS
QT INTERVAL: 346 MS
QTC INTERVAL: 430 MS
RBC # BLD AUTO: 4.6 MILLION/UL (ref 3.81–5.12)
SODIUM SERPL-SCNC: 140 MMOL/L (ref 136–145)
T WAVE AXIS: 49 DEGREES
VENTRICULAR RATE: 93 BPM
WBC # BLD AUTO: 7.77 THOUSAND/UL (ref 4.31–10.16)

## 2019-11-21 PROCEDURE — 93005 ELECTROCARDIOGRAM TRACING: CPT

## 2019-11-21 PROCEDURE — 85730 THROMBOPLASTIN TIME PARTIAL: CPT | Performed by: EMERGENCY MEDICINE

## 2019-11-21 PROCEDURE — 96374 THER/PROPH/DIAG INJ IV PUSH: CPT

## 2019-11-21 PROCEDURE — 71275 CT ANGIOGRAPHY CHEST: CPT

## 2019-11-21 PROCEDURE — 96375 TX/PRO/DX INJ NEW DRUG ADDON: CPT

## 2019-11-21 PROCEDURE — 36415 COLL VENOUS BLD VENIPUNCTURE: CPT | Performed by: EMERGENCY MEDICINE

## 2019-11-21 PROCEDURE — 80053 COMPREHEN METABOLIC PANEL: CPT | Performed by: EMERGENCY MEDICINE

## 2019-11-21 PROCEDURE — 85025 COMPLETE CBC W/AUTO DIFF WBC: CPT | Performed by: EMERGENCY MEDICINE

## 2019-11-21 PROCEDURE — 93010 ELECTROCARDIOGRAM REPORT: CPT | Performed by: INTERNAL MEDICINE

## 2019-11-21 PROCEDURE — 99285 EMERGENCY DEPT VISIT HI MDM: CPT

## 2019-11-21 PROCEDURE — 99284 EMERGENCY DEPT VISIT MOD MDM: CPT | Performed by: EMERGENCY MEDICINE

## 2019-11-21 PROCEDURE — 85610 PROTHROMBIN TIME: CPT | Performed by: EMERGENCY MEDICINE

## 2019-11-21 RX ORDER — LORAZEPAM 2 MG/ML
0.5 INJECTION INTRAMUSCULAR ONCE
Status: COMPLETED | OUTPATIENT
Start: 2019-11-21 | End: 2019-11-21

## 2019-11-21 RX ORDER — DIPHENHYDRAMINE HYDROCHLORIDE 50 MG/ML
50 INJECTION INTRAMUSCULAR; INTRAVENOUS ONCE
Status: COMPLETED | OUTPATIENT
Start: 2019-11-21 | End: 2019-11-21

## 2019-11-21 RX ORDER — KETOROLAC TROMETHAMINE 30 MG/ML
30 INJECTION, SOLUTION INTRAMUSCULAR; INTRAVENOUS ONCE
Status: COMPLETED | OUTPATIENT
Start: 2019-11-21 | End: 2019-11-21

## 2019-11-21 RX ADMIN — DIPHENHYDRAMINE HYDROCHLORIDE 50 MG: 50 INJECTION, SOLUTION INTRAMUSCULAR; INTRAVENOUS at 08:59

## 2019-11-21 RX ADMIN — KETOROLAC TROMETHAMINE 30 MG: 30 INJECTION, SOLUTION INTRAMUSCULAR at 06:08

## 2019-11-21 RX ADMIN — LORAZEPAM 0.5 MG: 2 INJECTION INTRAMUSCULAR; INTRAVENOUS at 06:43

## 2019-11-21 RX ADMIN — IODIXANOL 85 ML: 320 INJECTION, SOLUTION INTRAVASCULAR at 10:33

## 2019-11-21 RX ADMIN — HYDROCORTISONE SODIUM SUCCINATE 200 MG: 100 INJECTION, POWDER, FOR SOLUTION INTRAMUSCULAR; INTRAVENOUS at 06:08

## 2019-11-21 NOTE — DISCHARGE INSTRUCTIONS
CT findings included left lower lobe nodule that were a small enough to consider routine follow up  The recommendation was follow up in 12 months for reimaging  Sharp chest pain in the absence of cardiac or pulmonary risk findings is likely inflammatory discomfort from a benign cause  You can use any antiinflammatory over the counter like ibuprofen for pain  Return as needed for increasing shortness of breath, fever, chills

## 2019-11-21 NOTE — ED NOTES
Pt tearful and reports feeling anxious and "stressed" about current situation  Pt states "I wish someone was here with me " Offer made to call pt's daughter for her, she declined and stated she would be doing so via her personal cell phone  Pt provided comfort and therapeutic communication  Pt still tearful and requesting medication "something light" to help with her anxiety at this time  Dr Harvinder Castro made aware       Juanjo Chan, RN  11/21/19 1127

## 2019-11-21 NOTE — ED PROVIDER NOTES
History  Chief Complaint   Patient presents with    Chest Pain     pt awoken from sleep around 0500 this am with pain just under right breast  states feels "just like" when she had a PE in left lung  states hard to get a deep breath  45 yo female with hx of PE in 2014 after she was placed on BCP for perimenapausal symptoms who presents this am after waking up this am just PTA with R sided CP "under my breast" and pain whenshe takes a deep breath  Pt states "it feels just like when I had the PE but on the opposite side"  No calf swelling or pain but had severe amy horse in R calf 2 weeks ago  History provided by:  Patient   used: No    Chest Pain   Pain location:  R chest  Pain quality: aching    Pain radiates to:  Does not radiate  Pain radiates to the back: no    Pain severity:  Severe  Onset quality:  Sudden  Duration:  1 hour  Timing:  Constant  Progression:  Unchanged  Chronicity:  New  Relieved by:  Nothing  Worsened by:  Deep breathing  Ineffective treatments:  None tried  Associated symptoms: no abdominal pain, no altered mental status, no back pain, no cough, no dizziness, no fatigue, no fever, no headache, no lower extremity edema, no nausea, no orthopnea, no palpitations, no shortness of breath, not vomiting and no weakness    Risk factors: prior DVT/PE        Prior to Admission Medications   Prescriptions Last Dose Informant Patient Reported? Taking?    ALPRAZolam (XANAX) 0 25 mg tablet   Yes No   Sig: Take by mouth   B Complex Vitamins (VITAMIN B COMPLEX) TABS   Yes No   Sig: Take by mouth   Cetirizine HCl (ZYRTEC ALLERGY) 10 MG CAPS   Yes No   Sig: Take by mouth   Cholecalciferol (VITAMIN D) 2000 units CAPS   Yes No   Sig: Take by mouth   Magnesium 100 MG TABS   Yes No   Sig: Take by mouth   amitriptyline (ELAVIL) 50 mg tablet  Self Yes No   Sig: Take 20 mg by mouth daily     ammonium lactate (LAC-HYDRIN) 12 % cream   Yes No   Sig: Apply topically Twice daily atorvastatin (LIPITOR) 10 mg tablet   No No   Sig: TAKE 1 TABLET AT BEDTIME   fluticasone (FLONASE) 50 mcg/act nasal spray   No No   Si spray into each nostril 2 (two) times a day   omeprazole (PriLOSEC) 40 MG capsule   Yes No   ondansetron (ZOFRAN-ODT) 4 mg disintegrating tablet   No No   Sig: Take 1 tablet (4 mg total) by mouth every 8 (eight) hours as needed for nausea   pantoprazole (PROTONIX) 40 mg tablet   No No   Sig: TAKE 1 TABLET TWICE A DAY   promethazine (PHENERGAN) 25 mg tablet   No No   Sig: Take 1 tablet (25 mg total) by mouth every 6 (six) hours as needed for nausea or vomiting   sucralfate (CARAFATE) 1 g tablet   No No   Sig: Take 1 tablet (1 g total) by mouth 4 (four) times a day      Facility-Administered Medications: None       Past Medical History:   Diagnosis Date    Hyperlipidemia     Sleep apnea        Past Surgical History:   Procedure Laterality Date     SECTION  1991    CHOLECYSTECTOMY  2013    HEMORRHOID SURGERY  1998    TUBAL LIGATION  2007       Family History   Problem Relation Age of Onset    Alzheimer's disease Father     Diabetes Father     Leukemia Mother      I have reviewed and agree with the history as documented  Social History     Tobacco Use    Smoking status: Never Smoker    Smokeless tobacco: Never Used   Substance Use Topics    Alcohol use: No     Comment: SOCIAL DRINKER    Drug use: No        Review of Systems   Constitutional: Negative for appetite change, chills, fatigue and fever  HENT: Negative for congestion and sore throat  Eyes: Negative for visual disturbance  Respiratory: Negative for cough and shortness of breath  Cardiovascular: Positive for chest pain (R sided "under my breast")  Negative for palpitations and orthopnea  Gastrointestinal: Negative for abdominal pain, diarrhea, nausea and vomiting  Genitourinary: Negative for dysuria, frequency, vaginal bleeding and vaginal discharge     Musculoskeletal: Negative for back pain, neck pain and neck stiffness  Skin: Negative for pallor and rash  Allergic/Immunologic: Negative for immunocompromised state  Neurological: Negative for dizziness, weakness, light-headedness and headaches  Psychiatric/Behavioral: Negative for confusion  All other systems reviewed and are negative  Physical Exam  Physical Exam   Constitutional: She is oriented to person, place, and time  She appears well-developed and well-nourished  No distress  HENT:   Head: Normocephalic and atraumatic  Right Ear: External ear normal    Left Ear: External ear normal    Mouth/Throat: Oropharynx is clear and moist    Eyes: EOM are normal    Neck: Normal range of motion  Neck supple  Cardiovascular: Normal rate and regular rhythm  No murmur heard  Pulmonary/Chest: Effort normal and breath sounds normal  No respiratory distress  Abdominal: Soft  Bowel sounds are normal    Musculoskeletal: Normal range of motion  Right lower leg: Normal  She exhibits no tenderness and no edema  Left lower leg: Normal  She exhibits no tenderness and no edema  Neurological: She is alert and oriented to person, place, and time  Skin: Skin is warm  No rash noted  No pallor  Psychiatric: Her behavior is normal    anxious   Nursing note and vitals reviewed        Vital Signs  ED Triage Vitals [11/21/19 0529]   Temperature Pulse Respirations Blood Pressure SpO2   97 6 °F (36 4 °C) 105 18 137/69 100 %      Temp Source Heart Rate Source Patient Position - Orthostatic VS BP Location FiO2 (%)   Temporal Monitor Lying Right arm --      Pain Score       Worst Possible Pain           Vitals:    11/21/19 0529 11/21/19 0614   BP: 137/69 150/79   Pulse: 105 88   Patient Position - Orthostatic VS: Lying          Visual Acuity      ED Medications  Medications   diphenhydrAMINE (BENADRYL) injection 50 mg (has no administration in time range)   LORazepam (ATIVAN) 2 mg/mL injection 0 5 mg (has no administration in time range)   ketorolac (TORADOL) injection 30 mg (30 mg Intravenous Given 11/21/19 0608)   hydrocortisone sodium succinate (PF) (Solu-CORTEF) injection 200 mg (200 mg Intravenous Given 11/21/19 0608)       Diagnostic Studies  Results Reviewed     Procedure Component Value Units Date/Time    Comprehensive metabolic panel [[id number]] Collected:  11/21/19 0558    Lab Status:  Final result Specimen:  Blood from Arm, Left Updated:  11/21/19 0640     Sodium 140 mmol/L      Potassium 3 9 mmol/L      Chloride 103 mmol/L      CO2 30 mmol/L      ANION GAP 7 mmol/L      BUN 13 mg/dL      Creatinine 0 82 mg/dL      Glucose 122 mg/dL      Calcium 9 4 mg/dL      AST 23 U/L      ALT 47 U/L      Alkaline Phosphatase 114 U/L      Total Protein 7 9 g/dL      Albumin 3 8 g/dL      Total Bilirubin 0 55 mg/dL      eGFR 83 ml/min/1 73sq m     Narrative:       [place] guidelines for Chronic Kidney Disease (CKD):     Stage 1 with normal or high GFR (GFR > 90 mL/min/1 73 square meters)    Stage 2 Mild CKD (GFR = 60-89 mL/min/1 73 square meters)    Stage 3A Moderate CKD (GFR = 45-59 mL/min/1 73 square meters)    Stage 3B Moderate CKD (GFR = 30-44 mL/min/1 73 square meters)    Stage 4 Severe CKD (GFR = 15-29 mL/min/1 73 square meters)    Stage 5 End Stage CKD (GFR <15 mL/min/1 73 square meters)  Note: GFR calculation is accurate only with a steady state creatinine    Protime-INR [[id number]]  (Normal) Collected:  11/21/19 0558    Lab Status:  Final result Specimen:  Blood from Arm, Left Updated:  11/21/19 0627     Protime 11 7 seconds      INR 0 85    APTT [[id number]]  (Normal) Collected:  11/21/19 0558    Lab Status:  Final result Specimen:  Blood from Arm, Left Updated:  11/21/19 0627     PTT 31 seconds     CBC and differential [[id number]] Collected:  11/21/19 0558    Lab Status:  Final result Specimen:  Blood from Arm, Left Updated:  11/21/19 0612     WBC 7 77 Thousand/uL      RBC 4 60 Million/uL      Hemoglobin 13 6 g/dL      Hematocrit 42 5 %      MCV 92 fL      MCH 29 6 pg      MCHC 32 0 g/dL      RDW 13 1 %      MPV 10 3 fL      Platelets 382 Thousands/uL      nRBC 0 /100 WBCs      Neutrophils Relative 65 %      Immat GRANS % 0 %      Lymphocytes Relative 26 %      Monocytes Relative 5 %      Eosinophils Relative 4 %      Basophils Relative 0 %      Neutrophils Absolute 5 00 Thousands/µL      Immature Grans Absolute 0 02 Thousand/uL      Lymphocytes Absolute 2 02 Thousands/µL      Monocytes Absolute 0 41 Thousand/µL      Eosinophils Absolute 0 29 Thousand/µL      Basophils Absolute 0 03 Thousands/µL                  CTA ED chest PE study    (Results Pending)              Procedures  ECG 12 Lead Documentation Only  Date/Time: 11/21/2019 5:42 AM  Performed by: Rashawn Mann DO  Authorized by: Rashawn Mann DO     Indications / Diagnosis:  R sided CP  Patient location:  ED  Previous ECG:     Previous ECG:  Unavailable  Interpretation:     Interpretation: normal    Rate:     ECG rate:  93    ECG rate assessment: normal    Rhythm:     Rhythm: sinus rhythm    Ectopy:     Ectopy: none    QRS:     QRS axis:  Normal    QRS intervals:  Normal  Conduction:     Conduction: normal    ST segments:     ST segments:  Normal  T waves:     T waves: normal             ED Course  ED Course as of Nov 21 0643   Thu Nov 21, 2019   0540 Pt seen and examined  45 yo female with hx of PE in 2014 after she was placed on BCP for perimenapausal symptoms who presents this am after waking up this am just PTA with R sided CP "under my breast" and pain whenshe takes a deep breath  Pt states "it feels just like when I had the PE but on the opposite side"  No calf swelling or pain but had severe amy horse in R calf 2 weeks ago  Will check labs, give IVF, toradol  Pt needs prep for CT r/o PE - will give hydrocortisone now and wait 3 hours, give benadryl 50mg and then wait 1 hour    Pt likely will get CT chest around 10 am and is aware of plan  1622 Pt tearful and anxious about possibility of another PE - requesting something for nerves  Ativan 0 5mg IV ordered  0607 Labs all WNL  Pt signed out to Dr Lurlene Kanner who will f/u on CT results  MDM    Disposition  Final diagnoses:   Right-sided chest pain   Pleuritic chest pain   Anxiety about health     Time reflects when diagnosis was documented in both MDM as applicable and the Disposition within this note     Time User Action Codes Description Comment    11/21/2019  6:34 AM Leo VAZ Add [R07 9] Right-sided chest pain     11/21/2019  6:35 AM Leo VAZ Add [R07 81] Pleuritic chest pain     11/21/2019  6:35 AM Esha Sullivan Add [F41 8] Anxiety about health       ED Disposition     None      Follow-up Information    None         Patient's Medications   Discharge Prescriptions    No medications on file     No discharge procedures on file      ED Provider  Electronically Signed by           Abner Messina DO  11/21/19 5095

## 2019-11-21 NOTE — ED RE-EVALUATION NOTE
Care handed over to me for 43 yo female pending CTPA for right sided pleuritic chest pain  She requires the prep for contrast allery, so I am expecting the CT around 10am   She is otherwise, stable  11:00 CT findings reviewed at bedside regarding the specific wording of 2 6mm nodules  I compared the wording to CT in [facility] 3/6/2015, which mentions 1 5mm nodule  The concensus based on radiology read and her risk factors is low risk for malignancy, so that outpatient follow up with her PCP to consider repeat imaging is indicated  She understands and repeated back to me her understanding         Hanny Ferguson MD  11/21/19 6946

## 2019-12-05 ENCOUNTER — OFFICE VISIT (OUTPATIENT)
Dept: FAMILY MEDICINE CLINIC | Facility: CLINIC | Age: 49
End: 2019-12-05
Payer: OTHER GOVERNMENT

## 2019-12-05 VITALS
WEIGHT: 204 LBS | OXYGEN SATURATION: 97 % | BODY MASS INDEX: 36.14 KG/M2 | HEIGHT: 63 IN | TEMPERATURE: 100.6 F | SYSTOLIC BLOOD PRESSURE: 130 MMHG | HEART RATE: 111 BPM | DIASTOLIC BLOOD PRESSURE: 90 MMHG

## 2019-12-05 DIAGNOSIS — B34.9 VIRAL SYNDROME: Primary | ICD-10-CM

## 2019-12-05 PROCEDURE — 99213 OFFICE O/P EST LOW 20 MIN: CPT | Performed by: FAMILY MEDICINE

## 2019-12-05 RX ORDER — OSELTAMIVIR PHOSPHATE 75 MG/1
75 CAPSULE ORAL EVERY 12 HOURS SCHEDULED
Qty: 10 CAPSULE | Refills: 0 | Status: SHIPPED | OUTPATIENT
Start: 2019-12-05 | End: 2019-12-10

## 2019-12-05 NOTE — LETTER
December 5, 2019     Patient: Wang Gtz   YOB: 1970   Date of Visit: 12/5/2019       To Whom it May Concern:    Wang Gtz is under my professional care  She was seen in my office on 12/5/2019  She may return to work on December 9, 2019  If you have any questions or concerns, please don't hesitate to call           Sincerely,          Jagruti Silva, DO        CC: No Recipients

## 2019-12-06 NOTE — PROGRESS NOTES
Assessment/Plan:    Supportive care, Fluids and rest  Follow up if no better in 5 days  Diagnoses and all orders for this visit:    Viral syndrome  -     oseltamivir (TAMIFLU) 75 mg capsule; Take 1 capsule (75 mg total) by mouth every 12 (twelve) hours for 5 days            Subjective:        Patient ID: Padma Andersen is a 44 y o  female  Patient presents with:  Cold Like Symptoms: since tuesday ,mucus chills   freezing   Headache: since ltusday   Earache: since tuesday   Medication Refill: lac-hydrin cream 12%  pt needs refill heels very dry and cracking             The following portions of the patient's history were reviewed and updated as appropriate: allergies, current medications, past family history, past medical history, past social history, past surgical history and problem list       Review of Systems   Constitutional: Positive for activity change, appetite change, chills, fatigue and fever  HENT: Positive for congestion, ear pain, sinus pressure, sinus pain and sore throat  Eyes: Negative  Respiratory: Positive for cough and shortness of breath  Cardiovascular: Positive for chest pain  Gastrointestinal: Negative  Endocrine: Negative  Genitourinary: Negative  Musculoskeletal: Negative  Skin: Negative  Allergic/Immunologic: Negative  Neurological: Negative  Hematological: Negative  Psychiatric/Behavioral: Negative  All other systems reviewed and are negative  Objective:             /90   Pulse (!) 111   Temp (!) 100 6 °F (38 1 °C) (Tympanic)   Ht 5' 3" (1 6 m)   Wt 92 5 kg (204 lb)   LMP 01/12/2018 (Exact Date)   SpO2 97%   BMI 36 14 kg/m²          Physical Exam   Constitutional: She is oriented to person, place, and time  She appears well-developed and well-nourished  HENT:   Head: Normocephalic and atraumatic     Right Ear: External ear normal    Left Ear: External ear normal    Nose: Nose normal    Mouth/Throat: Oropharyngeal exudate present  Eyes: Pupils are equal, round, and reactive to light  Conjunctivae and EOM are normal    Neck: Normal range of motion  Neck supple  Cardiovascular: Normal rate, regular rhythm and normal heart sounds  Pulmonary/Chest: Effort normal and breath sounds normal    Abdominal: Soft  Bowel sounds are normal    Musculoskeletal: Normal range of motion  Neurological: She is alert and oriented to person, place, and time  She has normal reflexes  Skin: Skin is warm and dry  Psychiatric: She has a normal mood and affect  Her behavior is normal    Nursing note and vitals reviewed

## 2019-12-06 NOTE — PATIENT INSTRUCTIONS
Influenza   WHAT YOU NEED TO KNOW:   Influenza (the flu) is an infection caused by the influenza virus  The flu is easily spread when an infected person coughs, sneezes, or has close contact with others  You may be able to spread the flu to others for 1 week or longer after signs or symptoms appear  DISCHARGE INSTRUCTIONS:   Call 911 for any of the following:   · You have trouble breathing, and your lips look purple or blue  · You have a seizure  Return to the emergency department if:   · You are dizzy, or you are urinating less or not at all  · You have a headache with a stiff neck, and you feel tired or confused  · You have new pain or pressure in your chest     · Your symptoms, such as shortness of breath, vomiting, or diarrhea, get worse  · Your symptoms, such as fever and coughing, seem to get better, but then get worse  Contact your healthcare provider if:   · You have new muscle pain or weakness  · You have questions or concerns about your condition or care  Medicines: You may need any of the following:  · Acetaminophen  decreases pain and fever  It is available without a doctor's order  Ask how much to take and how often to take it  Follow directions  Acetaminophen can cause liver damage if not taken correctly  · NSAIDs , such as ibuprofen, help decrease swelling, pain, and fever  This medicine is available with or without a doctor's order  NSAIDs can cause stomach bleeding or kidney problems in certain people  If you take blood thinner medicine, always ask your healthcare provider if NSAIDs are safe for you  Always read the medicine label and follow directions  · Antivirals  help fight a viral infection  · Take your medicine as directed  Contact your healthcare provider if you think your medicine is not helping or if you have side effects  Tell him or her if you are allergic to any medicine  Keep a list of the medicines, vitamins, and herbs you take   Include the amounts, and when and why you take them  Bring the list or the pill bottles to follow-up visits  Carry your medicine list with you in case of an emergency  Rest  as much as you can to help you recover  Drink liquids as directed  to help prevent dehydration  Ask how much liquid to drink each day and which liquids are best for you  Prevent the spread of influenza:   · Wash your hands often  Use soap and water  Wash your hands after you use the bathroom, change a child's diapers, or sneeze  Wash your hands before you prepare or eat food  Use gel hand cleanser when soap and water are not available  Do not touch your eyes, nose, or mouth unless you have washed your hands first            · Cover your mouth when you sneeze or cough  Cough into a tissue or the bend of your arm  · Clean shared items with a germ-killing   Clean table surfaces, doorknobs, and light switches  Do not share towels, silverware, and dishes with people who are sick  Wash bed sheets, towels, silverware, and dishes with soap and water  · Wear a mask  over your mouth and nose if you are sick or are near anyone who is sick  · Stay away from others  if you are sick  · Influenza vaccine  helps prevent influenza (flu)  Everyone older than 6 months should get a yearly influenza vaccine  Get the vaccine as soon as it is available, usually in September or October each year  Follow up with your healthcare provider as directed:  Write down your questions so you remember to ask them during your visits  © 2017 [address] [employer] Information is for End User's use only and may not be sold, redistributed or otherwise used for commercial purposes  All illustrations and images included in CareNotes® are the copyrighted property of [employer]  or Nolberto Hernandez  The above information is an  only  It is not intended as medical advice for individual conditions or treatments   Talk to your doctor, nurse or pharmacist before following any medical regimen to see if it is safe and effective for you

## 2020-02-26 ENCOUNTER — HOSPITAL ENCOUNTER (OUTPATIENT)
Dept: ULTRASOUND IMAGING | Facility: HOSPITAL | Age: 50
Discharge: HOME | End: 2020-02-26
Payer: OTHER GOVERNMENT

## 2020-02-26 ENCOUNTER — TELEPHONE (OUTPATIENT)
Dept: FAMILY MEDICINE CLINIC | Facility: CLINIC | Age: 50
End: 2020-02-26

## 2020-02-26 ENCOUNTER — OFFICE VISIT (OUTPATIENT)
Dept: FAMILY MEDICINE CLINIC | Facility: CLINIC | Age: 50
End: 2020-02-26
Payer: OTHER GOVERNMENT

## 2020-02-26 VITALS
OXYGEN SATURATION: 96 % | SYSTOLIC BLOOD PRESSURE: 130 MMHG | HEART RATE: 96 BPM | TEMPERATURE: 97.8 F | DIASTOLIC BLOOD PRESSURE: 90 MMHG | RESPIRATION RATE: 18 BRPM | HEIGHT: 63 IN | WEIGHT: 211.2 LBS | BODY MASS INDEX: 37.42 KG/M2

## 2020-02-26 DIAGNOSIS — R10.2 SUPRAPUBIC PAIN: ICD-10-CM

## 2020-02-26 DIAGNOSIS — R10.32 LEFT LOWER QUADRANT PAIN: ICD-10-CM

## 2020-02-26 DIAGNOSIS — R10.2 SUPRAPUBIC PAIN: Primary | ICD-10-CM

## 2020-02-26 DIAGNOSIS — M79.7 FIBROMYALGIA: ICD-10-CM

## 2020-02-26 PROCEDURE — 99214 OFFICE O/P EST MOD 30 MIN: CPT | Performed by: FAMILY MEDICINE

## 2020-02-26 PROCEDURE — 1036F TOBACCO NON-USER: CPT | Performed by: FAMILY MEDICINE

## 2020-02-26 PROCEDURE — 76830 TRANSVAGINAL US NON-OB: CPT

## 2020-02-26 PROCEDURE — 76856 US EXAM PELVIC COMPLETE: CPT

## 2020-02-26 PROCEDURE — 76705 ECHO EXAM OF ABDOMEN: CPT

## 2020-02-26 PROCEDURE — 3008F BODY MASS INDEX DOCD: CPT | Performed by: FAMILY MEDICINE

## 2020-02-26 NOTE — PROGRESS NOTES
Assessment/Plan:  Patient does not wish to do CT scan  Will go for ultrasound of the abdomen as well as pelvis stat  Patient have laboratory studies and urinalysis done  Patient does not wish treatment at this time  Patient wishes be called with results  Will follow-up as needed  Guidance given to go to ER if symptoms would worsen  Diagnoses and all orders for this visit:    Suprapubic pain  -     Comprehensive metabolic panel; Future  -     CBC and differential; Future  -     Lipase; Future  -     UA w Reflex to Microscopic w Reflex to Culture -Lab Collect  -     US pelvis complete w transvaginal; Future  -     US abdomen complete; Future    Left lower quadrant pain  -     Comprehensive metabolic panel; Future  -     CBC and differential; Future  -     Lipase; Future  -     UA w Reflex to Microscopic w Reflex to Culture -Lab Collect  -     US pelvis complete w transvaginal; Future  -     US abdomen complete; Future    Fibromyalgia            Subjective:        Patient ID: Joel Figueroa is a 44 y o  female  Patient is here as a with left lower quadrant pain over the past 2 days  No significant change in bowels other patient will have constipation and loose bowels intermittently which is chronic in nature  No gross   Hematochezia  No dysuria or hematuria  Patient does have nausea  No vomiting  Patient does have a history of a hiatal hernia  No fever noted  Patient was moving a mattress roughly 1 day prior  Patient has pain with movement  The patient with pain at rest   Patient using Advil without any significant improvement  Patient's pain is 10/ 10 patient's pain is constant  Patient has been in menopause for 2 years  No vaginal bleeding noted          The following portions of the patient's history were reviewed and updated as appropriate: allergies, current medications, past family history, past medical history, past social history, past surgical history and problem list       Review of Systems   Constitutional: Negative  HENT: Negative  Eyes: Negative  Respiratory: Negative  Cardiovascular: Negative  Gastrointestinal: Positive for abdominal pain and nausea  Endocrine: Negative  Genitourinary: Negative  Musculoskeletal: Negative  Skin: Negative  Allergic/Immunologic: Negative  Neurological: Negative  Hematological: Negative  Psychiatric/Behavioral: Negative  Objective:      BMI Counseling: Body mass index is 37 41 kg/m²  The BMI is above normal  Nutrition recommendations include decreasing portion sizes  Exercise recommendations include moderate physical activity 150 minutes/week  /90 (BP Location: Left arm, Patient Position: Sitting, Cuff Size: Adult)   Pulse 96   Temp 97 8 °F (36 6 °C)   Resp 18   Ht 5' 3" (1 6 m)   Wt 95 8 kg (211 lb 3 2 oz)   LMP 01/12/2018 (Exact Date)   SpO2 96%   BMI 37 41 kg/m²          Physical Exam   Constitutional: She is oriented to person, place, and time  She appears well-developed and well-nourished  No distress  HENT:   Head: Normocephalic  Right Ear: External ear normal    Left Ear: External ear normal    Mouth/Throat: Oropharynx is clear and moist  No oropharyngeal exudate  Eyes: Pupils are equal, round, and reactive to light  EOM are normal  Right eye exhibits no discharge  Left eye exhibits no discharge  No scleral icterus  Neck: Normal range of motion  Neck supple  No thyromegaly present  Cardiovascular: Normal rate, regular rhythm, normal heart sounds and intact distal pulses  Exam reveals no gallop and no friction rub  No murmur heard  Pulmonary/Chest: Effort normal and breath sounds normal  No respiratory distress  She has no wheezes  She has no rales  She exhibits no tenderness  Abdominal: Soft  Bowel sounds are normal  She exhibits no distension  There is tenderness in the suprapubic area and left lower quadrant  There is no rebound and no guarding  Musculoskeletal: Normal range of motion  She exhibits deformity  She exhibits no edema or tenderness  Lymphadenopathy:     She has no cervical adenopathy  Neurological: She is oriented to person, place, and time  No cranial nerve deficit  She exhibits normal muscle tone  Coordination normal    Skin: Skin is warm and dry  No rash noted  She is not diaphoretic  No erythema  No pallor  Psychiatric: She has a normal mood and affect  Her behavior is normal  Judgment and thought content normal    Nursing note and vitals reviewed

## 2020-02-27 ENCOUNTER — TELEPHONE (OUTPATIENT)
Dept: FAMILY MEDICINE CLINIC | Facility: CLINIC | Age: 50
End: 2020-02-27

## 2020-02-27 NOTE — TELEPHONE ENCOUNTER
Spoke with patient and she will think about Ct with contrast and call back  I told her if she wants she can come in and talk to Dr Isaac Trent

## 2020-02-27 NOTE — TELEPHONE ENCOUNTER
Patient states her OB states the size of her Fibroid should not be causing her the pain she has  please what is the next step in her treatment or any ideas  You ordered the [address] [facility] [address] so she is asking you

## 2020-03-03 DIAGNOSIS — R73.9 ELEVATED BLOOD SUGAR LEVEL: Primary | ICD-10-CM

## 2020-03-04 LAB — HBA1C MFR BLD HPLC: 6.4 %

## 2020-03-20 ENCOUNTER — TELEMEDICINE (OUTPATIENT)
Dept: FAMILY MEDICINE CLINIC | Facility: CLINIC | Age: 50
End: 2020-03-20
Payer: OTHER GOVERNMENT

## 2020-03-20 ENCOUNTER — TELEPHONE (OUTPATIENT)
Dept: FAMILY MEDICINE CLINIC | Facility: CLINIC | Age: 50
End: 2020-03-20

## 2020-03-20 DIAGNOSIS — J01.90 ACUTE SINUSITIS, RECURRENCE NOT SPECIFIED, UNSPECIFIED LOCATION: Primary | ICD-10-CM

## 2020-03-20 PROCEDURE — G2012 BRIEF CHECK IN BY MD/QHP: HCPCS | Performed by: FAMILY MEDICINE

## 2020-03-20 RX ORDER — AZITHROMYCIN 250 MG/1
TABLET, FILM COATED ORAL
Qty: 6 TABLET | Refills: 0 | Status: SHIPPED | OUTPATIENT
Start: 2020-03-20 | End: 2020-03-25

## 2020-03-20 NOTE — PROGRESS NOTES
Virtual Brief Visit    Reason for visit is sinus pressure      Encounter provider Alexander Osman MD    Provider located at [address] [address] [id number]      Recent Visits  No visits were found meeting these conditions  Showing recent visits within past 7 days and meeting all other requirements     Today's Visits  Date Type Provider Dept   20 Telephone Shantell Allen, [address] [place] Mayra Younger today's visits and meeting all other requirements     Future Appointments  No visits were found meeting these conditions  Showing future appointments within next 150 days and meeting all other requirements        Patient agrees to participate in a virtual check in via telephone or video visit instead of presenting to the office to address urgent/immediate medical needs  Patient is aware this is a billable service  After connecting through telephone, the patient was identified by name and date of birth  Karime Sampson was informed that this was a telemedicine visit and that the visit is being conducted through telephone which may not be secure and therefore might not be HIPAA-compliant  My office door was closed  No one else was in the room  She acknowledged consent and understanding of privacy and security of the virtual check-in visit  I informed the patient that I have reviewed her record in Epic and presented the opportunity for her to ask any questions regarding the visit today  The patient initiated communication and agreed to participate  Subjective  Karime Sampson is a 44 y o  female who calls complaining of several days of cough congestion and postnasal drip  No fevers chills nausea vomiting  Tolerating p o  Intake  No recent sick contacts or travel        Past Medical History:   Diagnosis Date    Hyperlipidemia     Sleep apnea        Past Surgical History:   Procedure Laterality Date     SECTION  05/1991    CHOLECYSTECTOMY  02/2013    HEMORRHOID SURGERY  09/1998    TUBAL LIGATION  2007       Current Outpatient Medications   Medication Sig Dispense Refill    ALPRAZolam (XANAX) 0 25 mg tablet Take by mouth      amitriptyline (ELAVIL) 50 mg tablet Take 25 mg by mouth daily       ammonium lactate (LAC-HYDRIN) 12 % cream Apply topically Twice daily      atorvastatin (LIPITOR) 10 mg tablet TAKE 1 TABLET AT BEDTIME 90 tablet 4    B Complex Vitamins (VITAMIN B COMPLEX) TABS Take by mouth      Cetirizine HCl (ZYRTEC ALLERGY) 10 MG CAPS Take by mouth      Cholecalciferol (VITAMIN D) 2000 units CAPS Take by mouth      fluticasone (FLONASE) 50 mcg/act nasal spray 1 spray into each nostril 2 (two) times a day 1 Bottle 0    Magnesium 100 MG TABS Take by mouth      omeprazole (PriLOSEC) 40 MG capsule       ondansetron (ZOFRAN-ODT) 4 mg disintegrating tablet Take 1 tablet (4 mg total) by mouth every 8 (eight) hours as needed for nausea 30 tablet 0    pantoprazole (PROTONIX) 40 mg tablet TAKE 1 TABLET TWICE A DAY 30 tablet 0    promethazine (PHENERGAN) 25 mg tablet Take 1 tablet (25 mg total) by mouth every 6 (six) hours as needed for nausea or vomiting 30 tablet 0    sucralfate (CARAFATE) 1 g tablet Take 1 tablet (1 g total) by mouth 4 (four) times a day (Patient not taking: Reported on 11/21/2019) 30 tablet 1     No current facility-administered medications for this visit  Allergies   Allergen Reactions    Amoxicillin-Pot Clavulanate     Iodinated Diagnostic Agents      Needs to be prepped for IVC    Iothalamate Hives     Contrast dye    Sulfa Antibiotics        Assessment    57-year-old woman with:  Acute sinusitis  Discussed supportive care return parameters  Encouraged Flonase and will send a Z-Jeovany to begin if symptoms are not improving or worsening  I would code this a [id number]    I spent 5 minutes with the patient during this virtual check-in visit

## 2020-03-20 NOTE — TELEPHONE ENCOUNTER
COVID-19 Phone Call Triage    Cristian Stinson called stating that they are having Cough and Nasal Congestion  Cristian Stinson has not traveled outside the [place]  within the last 14 days  Radha Granados has not been around any one ill or exposed to COVID-19 also stating she is coughing up yellow phleghm  Symptoms started 2 days ago  Please call patient to triage

## 2020-04-11 ENCOUNTER — OFFICE VISIT (OUTPATIENT)
Dept: URGENT CARE | Age: 50
End: 2020-04-11
Payer: OTHER GOVERNMENT

## 2020-04-11 VITALS
BODY MASS INDEX: 37.39 KG/M2 | DIASTOLIC BLOOD PRESSURE: 92 MMHG | WEIGHT: 211 LBS | HEART RATE: 86 BPM | RESPIRATION RATE: 18 BRPM | OXYGEN SATURATION: 99 % | TEMPERATURE: 97.4 F | SYSTOLIC BLOOD PRESSURE: 134 MMHG | HEIGHT: 63 IN

## 2020-04-11 DIAGNOSIS — M79.7 FIBROMYALGIA AFFECTING MULTIPLE SITES: ICD-10-CM

## 2020-04-11 DIAGNOSIS — F41.0 PANIC ATTACK: Primary | ICD-10-CM

## 2020-04-11 LAB
ATRIAL RATE: 79 BPM
P AXIS: 33 DEGREES
PR INTERVAL: 150 MS
QRS AXIS: 9 DEGREES
QRSD INTERVAL: 80 MS
QT INTERVAL: 376 MS
QTC INTERVAL: 431 MS
T WAVE AXIS: 18 DEGREES
VENTRICULAR RATE: 79 BPM

## 2020-04-11 PROCEDURE — G0382 LEV 3 HOSP TYPE B ED VISIT: HCPCS | Performed by: PHYSICIAN ASSISTANT

## 2020-04-11 PROCEDURE — 93005 ELECTROCARDIOGRAM TRACING: CPT | Performed by: PHYSICIAN ASSISTANT

## 2020-04-11 PROCEDURE — 93010 ELECTROCARDIOGRAM REPORT: CPT | Performed by: INTERNAL MEDICINE

## 2020-06-01 ENCOUNTER — TELEPHONE (OUTPATIENT)
Dept: FAMILY MEDICINE CLINIC | Facility: CLINIC | Age: 50
End: 2020-06-01

## 2020-06-01 DIAGNOSIS — G47.30 SLEEP APNEA, UNSPECIFIED TYPE: ICD-10-CM

## 2020-06-01 DIAGNOSIS — E66.09 CLASS 2 OBESITY DUE TO EXCESS CALORIES WITHOUT SERIOUS COMORBIDITY WITH BODY MASS INDEX (BMI) OF 36.0 TO 36.9 IN ADULT: Primary | ICD-10-CM

## 2020-06-24 ENCOUNTER — OFFICE VISIT (OUTPATIENT)
Dept: FAMILY MEDICINE CLINIC | Facility: CLINIC | Age: 50
End: 2020-06-24
Payer: OTHER GOVERNMENT

## 2020-06-24 VITALS
BODY MASS INDEX: 37.39 KG/M2 | DIASTOLIC BLOOD PRESSURE: 78 MMHG | SYSTOLIC BLOOD PRESSURE: 120 MMHG | HEIGHT: 63 IN | HEART RATE: 64 BPM | WEIGHT: 211 LBS

## 2020-06-24 DIAGNOSIS — M25.561 CHRONIC PAIN OF BOTH KNEES: ICD-10-CM

## 2020-06-24 DIAGNOSIS — L85.3 XEROSIS OF SKIN: Primary | ICD-10-CM

## 2020-06-24 DIAGNOSIS — M25.562 CHRONIC PAIN OF BOTH KNEES: ICD-10-CM

## 2020-06-24 DIAGNOSIS — G89.29 CHRONIC PAIN OF BOTH KNEES: ICD-10-CM

## 2020-06-24 PROCEDURE — 99213 OFFICE O/P EST LOW 20 MIN: CPT | Performed by: FAMILY MEDICINE

## 2020-06-24 PROCEDURE — 1036F TOBACCO NON-USER: CPT | Performed by: FAMILY MEDICINE

## 2020-06-24 PROCEDURE — 3008F BODY MASS INDEX DOCD: CPT | Performed by: FAMILY MEDICINE

## 2020-06-24 PROCEDURE — 20610 DRAIN/INJ JOINT/BURSA W/O US: CPT | Performed by: FAMILY MEDICINE

## 2020-06-24 RX ORDER — AMMONIUM LACTATE 12 G/100G
CREAM TOPICAL AS NEEDED
Qty: 385 G | Refills: 2 | Status: SHIPPED | OUTPATIENT
Start: 2020-06-24 | End: 2021-04-16

## 2020-06-24 RX ADMIN — LIDOCAINE HYDROCHLORIDE 4 ML: 20 INJECTION, SOLUTION EPIDURAL; INFILTRATION; INTRACAUDAL; PERINEURAL at 12:38

## 2020-06-24 RX ADMIN — METHYLPREDNISOLONE ACETATE 1 ML: 80 INJECTION, SUSPENSION INTRA-ARTICULAR; INTRALESIONAL; INTRAMUSCULAR; SOFT TISSUE at 12:38

## 2020-06-25 PROBLEM — M25.561 CHRONIC PAIN OF BOTH KNEES: Status: ACTIVE | Noted: 2019-05-08

## 2020-06-25 PROBLEM — G89.29 CHRONIC PAIN OF BOTH KNEES: Status: ACTIVE | Noted: 2019-05-08

## 2020-06-25 RX ORDER — METHYLPREDNISOLONE ACETATE 80 MG/ML
1 INJECTION, SUSPENSION INTRA-ARTICULAR; INTRALESIONAL; INTRAMUSCULAR; SOFT TISSUE
Status: COMPLETED | OUTPATIENT
Start: 2020-06-24 | End: 2020-06-24

## 2020-06-25 RX ORDER — LIDOCAINE HYDROCHLORIDE 20 MG/ML
4 INJECTION, SOLUTION EPIDURAL; INFILTRATION; INTRACAUDAL; PERINEURAL
Status: COMPLETED | OUTPATIENT
Start: 2020-06-24 | End: 2020-06-24

## 2020-08-26 ENCOUNTER — TELEMEDICINE (OUTPATIENT)
Dept: FAMILY MEDICINE CLINIC | Facility: CLINIC | Age: 50
End: 2020-08-26
Payer: OTHER GOVERNMENT

## 2020-08-26 DIAGNOSIS — J06.9 ACUTE UPPER RESPIRATORY INFECTION: Primary | ICD-10-CM

## 2020-08-26 PROCEDURE — 1036F TOBACCO NON-USER: CPT | Performed by: FAMILY MEDICINE

## 2020-08-26 PROCEDURE — 99213 OFFICE O/P EST LOW 20 MIN: CPT | Performed by: FAMILY MEDICINE

## 2020-08-26 RX ORDER — AZITHROMYCIN 250 MG/1
TABLET, FILM COATED ORAL
Qty: 6 TABLET | Refills: 0 | Status: SHIPPED | OUTPATIENT
Start: 2020-08-26 | End: 2020-08-31

## 2020-08-27 NOTE — PROGRESS NOTES
Virtual Regular Visit    Assessment/Plan:    Problem List Items Addressed This Visit        Respiratory    Acute upper respiratory infection - Primary    Relevant Medications    azithromycin (Zithromax) 250 mg tablet        47-year-old woman with:  Acute URI  Discussed treatment options with risks and benefits patient opts to continue her current regimen and add a Z-Jeovany offered COVID testing but she declines discussed supportive care return parameters otherwise and advised to call back if not improving worsening    BMI Counseling: There is no height or weight on file to calculate BMI  The BMI is above normal  Nutrition recommendations include encouraging healthy choices of fruits and vegetables  Exercise recommendations include moderate physical activity 150 minutes/week  Reason for visit is   Chief Complaint   Patient presents with    Sinusitis    Virtual Regular Visit        Encounter provider Mickey Abraham MD    Provider located at [address] [place] [phone]      Recent Visits  Date Type Provider Dept   08/26/20 Telemedicine Mickey Abraham MD [facility] recent visits within past 7 days and meeting all other requirements     Future Appointments  No visits were found meeting these conditions  Showing future appointments within next 150 days and meeting all other requirements        The patient was identified by name and date of birth  Amlaia Olvera was informed that this is a telemedicine visit and that the visit is being conducted through [employer]  My office door was closed  No one else was in the room  She acknowledged consent and understanding of privacy and security of the video platform  The patient has agreed to participate and understands they can discontinue the visit at any time  Patient is aware this is a billable service  Subjective  Amalia Olvera is a 44 y o  female  Patient is a 53-year-old woman who presents via virtual visit complaining of 1 week of cough congestion sinus pressure with sore throat she admits this seems identical to past episodes of sinus infections no other complaints at this time       Past Medical History:   Diagnosis Date    Hyperlipidemia     Sleep apnea        Past Surgical History:   Procedure Laterality Date     SECTION  1991    CHOLECYSTECTOMY  2013    HEMORRHOID SURGERY  1998    TUBAL LIGATION         Current Outpatient Medications   Medication Sig Dispense Refill    amitriptyline (ELAVIL) 50 mg tablet Take 25 mg by mouth daily       ammonium lactate (LAC-HYDRIN) 12 % cream Apply topically as needed for dry skin 385 g 2    atorvastatin (LIPITOR) 10 mg tablet TAKE 1 TABLET AT BEDTIME 90 tablet 4    B Complex Vitamins (VITAMIN B COMPLEX) TABS Take by mouth      Cetirizine HCl (ZYRTEC ALLERGY) 10 MG CAPS Take by mouth      fluticasone (FLONASE) 50 mcg/act nasal spray 1 spray into each nostril 2 (two) times a day 1 Bottle 0    Magnesium 100 MG TABS Take by mouth      omeprazole (PriLOSEC) 40 MG capsule       ondansetron (ZOFRAN-ODT) 4 mg disintegrating tablet Take 1 tablet (4 mg total) by mouth every 8 (eight) hours as needed for nausea 30 tablet 0    azithromycin (Zithromax) 250 mg tablet Take 2 tablets (500 mg total) by mouth daily for 1 day, THEN 1 tablet (250 mg total) daily for 4 days  6 tablet 0    Cholecalciferol (VITAMIN D) 2000 units CAPS Take by mouth       No current facility-administered medications for this visit  Allergies   Allergen Reactions    Amoxicillin-Pot Clavulanate     Iodinated Diagnostic Agents      Needs to be prepped for IVC    Iothalamate Hives     Contrast dye    Sulfa Antibiotics        Review of Systems   Constitutional: Negative  HENT: Positive for congestion, sinus pressure and sore throat  Eyes: Negative  Respiratory: Positive for cough  Cardiovascular: Negative  Gastrointestinal: Negative  Endocrine: Negative  Genitourinary: Negative  Musculoskeletal: Negative  Allergic/Immunologic: Negative  Neurological: Negative  Hematological: Negative  Psychiatric/Behavioral: Negative  All other systems reviewed and are negative  Video Exam    There were no vitals filed for this visit  Physical Exam  Constitutional:       Appearance: She is well-developed  HENT:      Head: Atraumatic  Right Ear: External ear normal       Left Ear: External ear normal    Eyes:      Conjunctiva/sclera: Conjunctivae normal       Pupils: Pupils are equal, round, and reactive to light  Neck:      Musculoskeletal: Normal range of motion  Pulmonary:      Effort: Pulmonary effort is normal  No respiratory distress  Abdominal:      General: There is no distension  Musculoskeletal: Normal range of motion  Skin:     General: Skin is warm and dry  Neurological:      Mental Status: She is alert and oriented to person, place, and time  Cranial Nerves: No cranial nerve deficit  Psychiatric:         Behavior: Behavior normal          Thought Content: Thought content normal          Judgment: Judgment normal           I spent 10 minutes directly with the patient during this visit    [facility] acknowledges that she has consented to an online visit or consultation  She understands that the online visit is based solely on information provided by her, and that, in the absence of a face-to-face physical evaluation by the physician, the diagnosis she receives is both limited and provisional in terms of accuracy and completeness  This is not intended to replace a full medical face-to-face evaluation by the physician  Dena Abel understands and accepts these terms

## 2020-08-31 ENCOUNTER — TELEMEDICINE (OUTPATIENT)
Dept: FAMILY MEDICINE CLINIC | Facility: CLINIC | Age: 50
End: 2020-08-31
Payer: OTHER GOVERNMENT

## 2020-08-31 ENCOUNTER — TELEPHONE (OUTPATIENT)
Dept: FAMILY MEDICINE CLINIC | Facility: CLINIC | Age: 50
End: 2020-08-31

## 2020-08-31 DIAGNOSIS — J01.10 ACUTE NON-RECURRENT FRONTAL SINUSITIS: Primary | ICD-10-CM

## 2020-08-31 DIAGNOSIS — J31.0 RHINITIS MEDICAMENTOSA: ICD-10-CM

## 2020-08-31 DIAGNOSIS — T48.5X5A RHINITIS MEDICAMENTOSA: ICD-10-CM

## 2020-08-31 PROCEDURE — 99213 OFFICE O/P EST LOW 20 MIN: CPT | Performed by: FAMILY MEDICINE

## 2020-08-31 PROCEDURE — 1036F TOBACCO NON-USER: CPT | Performed by: FAMILY MEDICINE

## 2020-08-31 RX ORDER — DOXYCYCLINE HYCLATE 100 MG
100 TABLET ORAL 2 TIMES DAILY
Qty: 28 TABLET | Refills: 0 | Status: SHIPPED | OUTPATIENT
Start: 2020-08-31 | End: 2020-09-14

## 2020-08-31 RX ORDER — METHYLPREDNISOLONE 4 MG/1
TABLET ORAL
Qty: 21 EACH | Refills: 0 | Status: SHIPPED | OUTPATIENT
Start: 2020-08-31 | End: 2020-11-18 | Stop reason: ALTCHOICE

## 2020-08-31 NOTE — PROGRESS NOTES
Virtual Regular Visit      Assessment/Plan:    Problem List Items Addressed This Visit        Respiratory    Acute non-recurrent frontal sinusitis - Primary    Relevant Medications    doxycycline hyclate (VIBRA-TABS) 100 mg tablet    Rhinitis medicamentosa    Relevant Medications    methylPREDNISolone 4 MG tablet therapy pack        Patient will use Flonase and will not use Afrin nasal spray  Patient use Medrol Dosepak as well as doxycycline as directed  Reason for visit is   Chief Complaint   Patient presents with    Sinus Problem     Patient states she is still having a sore throat and swellon glands , stuffy nose -using over the counter nasal spray, sinus headache, hard time breathing out of her nose   Virtual Regular Visit        Encounter provider Krishna Barbosa DO    Provider located at [address] [place] [phone]      Recent Visits  Date Type Provider Dept   08/26/20 Telemedicine Alejandrina Martinez MD [facility] recent visits within past 7 days and meeting all other requirements     Today's Visits  Date Type Provider Dept   08/31/20 Telemedicine Pedro Lima DO [address]   08/31/20 Telephone Alejandrina Martinez MD [address] [place] [address] today's visits and meeting all other requirements     Future Appointments  No visits were found meeting these conditions  Showing future appointments within next 150 days and meeting all other requirements        The patient was identified by name and date of birth  Jose Roth was informed that this is a telemedicine visit and that the visit is being conducted through [employer]  My office door was closed  No one else was in the room  She acknowledged consent and understanding of privacy and security of the video platform   The patient has agreed to participate and understands they can discontinue the visit at any time     Patient is aware this is a billable service  Subjective  Carlos Ross is a 44 y o  female as below   With ongoing sinus discomfort as well as sore throat and swollen glands and nasal congestion  Patient's symptoms have been going on for roughly 3 weeks  Patient tried Z-Jeovany without any significant improvement  Patient has been using Afrin daily for the last 3 weeks  Patient has used saline rinses with discomfort  No fever  Past Medical History:   Diagnosis Date    Hyperlipidemia     Sleep apnea        Past Surgical History:   Procedure Laterality Date     SECTION  1991    CHOLECYSTECTOMY  2013    HEMORRHOID SURGERY  1998    TUBAL LIGATION         Current Outpatient Medications   Medication Sig Dispense Refill    amitriptyline (ELAVIL) 50 mg tablet Take 25 mg by mouth daily       ammonium lactate (LAC-HYDRIN) 12 % cream Apply topically as needed for dry skin 385 g 2    atorvastatin (LIPITOR) 10 mg tablet TAKE 1 TABLET AT BEDTIME 90 tablet 4    azithromycin (Zithromax) 250 mg tablet Take 2 tablets (500 mg total) by mouth daily for 1 day, THEN 1 tablet (250 mg total) daily for 4 days   6 tablet 0    B Complex Vitamins (VITAMIN B COMPLEX) TABS Take by mouth      Cetirizine HCl (ZYRTEC ALLERGY) 10 MG CAPS Take by mouth      Cholecalciferol (VITAMIN D) 2000 units CAPS Take by mouth      fluticasone (FLONASE) 50 mcg/act nasal spray 1 spray into each nostril 2 (two) times a day 1 Bottle 0    Magnesium 100 MG TABS Take by mouth      omeprazole (PriLOSEC) 40 MG capsule       ondansetron (ZOFRAN-ODT) 4 mg disintegrating tablet Take 1 tablet (4 mg total) by mouth every 8 (eight) hours as needed for nausea 30 tablet 0    doxycycline hyclate (VIBRA-TABS) 100 mg tablet Take 1 tablet (100 mg total) by mouth 2 (two) times a day for 14 days 28 tablet 0    methylPREDNISolone 4 MG tablet therapy pack Use as directed on package 21 each 0     No current facility-administered medications for this visit  Allergies   Allergen Reactions    Amoxicillin-Pot Clavulanate     Iodinated Diagnostic Agents      Needs to be prepped for IVC    Iothalamate Hives     Contrast dye    Sulfa Antibiotics        Review of Systems   Constitutional: Negative for fever  HENT: Positive for congestion, postnasal drip, sinus pressure and sinus pain  Video Exam    There were no vitals filed for this visit  Physical Exam  Vitals signs and nursing note reviewed  Constitutional:       Appearance: Normal appearance  HENT:      Head: Normocephalic and atraumatic  Right Ear: External ear normal       Left Ear: External ear normal    Neurological:      Mental Status: She is alert  I spent 15 minutes directly with the patient during this visit      VIRTUAL VISIT DISCLAIMER    Julia Zhong acknowledges that she has consented to an online visit or consultation  She understands that the online visit is based solely on information provided by her, and that, in the absence of a face-to-face physical evaluation by the physician, the diagnosis she receives is both limited and provisional in terms of accuracy and completeness  This is not intended to replace a full medical face-to-face evaluation by the physician  Julia Zhong understands and accepts these terms

## 2020-09-30 ENCOUNTER — TELEMEDICINE (OUTPATIENT)
Dept: FAMILY MEDICINE CLINIC | Facility: CLINIC | Age: 50
End: 2020-09-30
Payer: OTHER GOVERNMENT

## 2020-09-30 VITALS — HEIGHT: 63 IN | HEART RATE: 86 BPM | WEIGHT: 211 LBS | BODY MASS INDEX: 37.39 KG/M2

## 2020-09-30 DIAGNOSIS — J30.1 NON-SEASONAL ALLERGIC RHINITIS DUE TO POLLEN: ICD-10-CM

## 2020-09-30 DIAGNOSIS — J01.10 ACUTE NON-RECURRENT FRONTAL SINUSITIS: Primary | ICD-10-CM

## 2020-09-30 PROCEDURE — 99213 OFFICE O/P EST LOW 20 MIN: CPT | Performed by: FAMILY MEDICINE

## 2020-09-30 RX ORDER — PREDNISONE 10 MG/1
TABLET ORAL
Qty: 40 TABLET | Refills: 0 | Status: SHIPPED | OUTPATIENT
Start: 2020-09-30 | End: 2020-11-18 | Stop reason: ALTCHOICE

## 2020-09-30 RX ORDER — AZELASTINE 1 MG/ML
1 SPRAY, METERED NASAL 2 TIMES DAILY
Qty: 3 BOTTLE | Refills: 1 | Status: SHIPPED | OUTPATIENT
Start: 2020-09-30 | End: 2021-07-13

## 2020-09-30 NOTE — LETTER
September 30, 2020     Patient: Uziel Apple   YOB: 1970   Date of Visit: 9/30/2020       To Whom it May Concern:    Uziel Apple is under my professional care  She was evaluated on 9/30/2020  She may return to work on 10/2/2020  If you have any questions or concerns, please don't hesitate to call           Sincerely,          Gill Jurado MD        CC: No Recipients

## 2020-09-30 NOTE — PROGRESS NOTES
Virtual Regular Visit    Assessment/Plan:    Problem List Items Addressed This Visit        Respiratory    Acute non-recurrent frontal sinusitis - Primary    Relevant Medications    azelastine (ASTELIN) 0 1 % nasal spray    predniSONE 10 mg tablet    Other Relevant Orders    Ambulatory referral to Allergy    Non-seasonal allergic rhinitis due to pollen    Relevant Medications    predniSONE 10 mg tablet    Other Relevant Orders    Ambulatory referral to Allergy        57-year-old woman with: Allergic rhinitis discussed treatment options with risks and benefits for chronic rhinosinusitis symptoms continue Flonase add steroid taper along with as lasting and refer to allergist advised to call back if not improving or worsening       Reason for visit is   Chief Complaint   Patient presents with    Cough     pt c/o sinus infection with cough and headache , pt denies fever    Virtual Regular Visit        Encounter provider Brian Lara MD    Provider located at [address] [place] [id number]      Recent Visits  No visits were found meeting these conditions  Showing recent visits within past 7 days and meeting all other requirements     Today's Visits  Date Type Provider Dept   09/30/20 Telemedicine Brian Lara MD [facility] [facility] today's visits and meeting all other requirements     Future Appointments  No visits were found meeting these conditions  Showing future appointments within next 150 days and meeting all other requirements        The patient was identified by name and date of birth  Phoebe Vásquez was informed that this is a telemedicine visit and that the visit is being conducted through [employer]  My office door was closed  No one else was in the room  She acknowledged consent and understanding of privacy and security of the video platform   The patient has agreed to participate and understands they can discontinue the visit at any time  Patient is aware this is a billable service  Subjective  Julia Zhong is a 44 y o  female  Patient is a 77-year-old woman who presents complaining of persistent cough congestion and sinus pressure over the past month no fevers chills nausea vomiting  Tolerating p o  Intake she is doing Flonase and feels that is not helping she admits she previously was on allergy shots for 15 years       Past Medical History:   Diagnosis Date    Hyperlipidemia     Sleep apnea        Past Surgical History:   Procedure Laterality Date     SECTION  1991    CHOLECYSTECTOMY  2013    HEMORRHOID SURGERY  1998    TUBAL LIGATION         Current Outpatient Medications   Medication Sig Dispense Refill    amitriptyline (ELAVIL) 50 mg tablet Take 25 mg by mouth daily       ammonium lactate (LAC-HYDRIN) 12 % cream Apply topically as needed for dry skin 385 g 2    atorvastatin (LIPITOR) 10 mg tablet TAKE 1 TABLET AT BEDTIME 90 tablet 4    B Complex Vitamins (VITAMIN B COMPLEX) TABS Take by mouth      Cetirizine HCl (ZYRTEC ALLERGY) 10 MG CAPS Take by mouth      Cholecalciferol (VITAMIN D) 2000 units CAPS Take by mouth      fluticasone (FLONASE) 50 mcg/act nasal spray 1 spray into each nostril 2 (two) times a day 1 Bottle 0    Magnesium 100 MG TABS Take by mouth      azelastine (ASTELIN) 0 1 % nasal spray 1 spray into each nostril 2 (two) times a day Use in each nostril as directed 3 Bottle 1    methylPREDNISolone 4 MG tablet therapy pack Use as directed on package (Patient not taking: Reported on 2020) 21 each 0    omeprazole (PriLOSEC) 40 MG capsule       ondansetron (ZOFRAN-ODT) 4 mg disintegrating tablet Take 1 tablet (4 mg total) by mouth every 8 (eight) hours as needed for nausea (Patient not taking: Reported on 2020) 30 tablet 0    predniSONE 10 mg tablet Take 4 tabs daily x 3d, then 3x3d, then 2x3d, then 1x3d, then stop  40 tablet 0     No current facility-administered medications for this visit  Allergies   Allergen Reactions    Amoxicillin-Pot Clavulanate     Iodinated Diagnostic Agents      Needs to be prepped for IVC    Iothalamate Hives     Contrast dye    Sulfa Antibiotics        Review of Systems   Constitutional: Negative  HENT: Positive for congestion and sinus pressure  Eyes: Negative  Respiratory: Positive for cough  Cardiovascular: Negative  Gastrointestinal: Negative  Endocrine: Negative  Genitourinary: Negative  Musculoskeletal: Negative  Allergic/Immunologic: Negative  Neurological: Negative  Hematological: Negative  Psychiatric/Behavioral: Negative  All other systems reviewed and are negative  Video Exam    Vitals:    09/30/20 1721   Pulse: 86   Weight: 95 7 kg (211 lb)   Height: 5' 3" (1 6 m)       Physical Exam  Constitutional:       Appearance: She is well-developed  HENT:      Head: Atraumatic  Right Ear: External ear normal       Left Ear: External ear normal    Eyes:      Conjunctiva/sclera: Conjunctivae normal       Pupils: Pupils are equal, round, and reactive to light  Neck:      Musculoskeletal: Normal range of motion  Pulmonary:      Effort: Pulmonary effort is normal  No respiratory distress  Abdominal:      General: There is no distension  Musculoskeletal: Normal range of motion  Skin:     General: Skin is warm and dry  Neurological:      Mental Status: She is alert and oriented to person, place, and time  Cranial Nerves: No cranial nerve deficit  Psychiatric:         Behavior: Behavior normal          Thought Content: Thought content normal          Judgment: Judgment normal           I spent 10 minutes directly with the patient during this visit      [facility] acknowledges that she has consented to an online visit or consultation   She understands that the online visit is based solely on information provided by her, and that, in the absence of a face-to-face physical evaluation by the physician, the diagnosis she receives is both limited and provisional in terms of accuracy and completeness  This is not intended to replace a full medical face-to-face evaluation by the physician  Donte Mendez understands and accepts these terms

## 2020-10-01 ENCOUNTER — TELEPHONE (OUTPATIENT)
Dept: FAMILY MEDICINE CLINIC | Facility: CLINIC | Age: 50
End: 2020-10-01

## 2020-10-11 ENCOUNTER — OFFICE VISIT (OUTPATIENT)
Dept: URGENT CARE | Age: 50
End: 2020-10-11
Payer: OTHER GOVERNMENT

## 2020-10-11 VITALS
DIASTOLIC BLOOD PRESSURE: 72 MMHG | HEIGHT: 63 IN | OXYGEN SATURATION: 98 % | TEMPERATURE: 98.3 F | BODY MASS INDEX: 38.62 KG/M2 | HEART RATE: 99 BPM | WEIGHT: 218 LBS | SYSTOLIC BLOOD PRESSURE: 129 MMHG | RESPIRATION RATE: 18 BRPM

## 2020-10-11 DIAGNOSIS — B96.89 ACUTE BACTERIAL BRONCHITIS: Primary | ICD-10-CM

## 2020-10-11 DIAGNOSIS — J20.8 ACUTE BACTERIAL BRONCHITIS: Primary | ICD-10-CM

## 2020-10-11 PROCEDURE — G0382 LEV 3 HOSP TYPE B ED VISIT: HCPCS | Performed by: PHYSICIAN ASSISTANT

## 2020-10-11 RX ORDER — ALBUTEROL SULFATE 90 UG/1
2 AEROSOL, METERED RESPIRATORY (INHALATION) EVERY 6 HOURS PRN
Qty: 18 G | Refills: 0 | Status: SHIPPED | OUTPATIENT
Start: 2020-10-11 | End: 2020-10-13

## 2020-10-11 RX ORDER — AZITHROMYCIN 250 MG/1
TABLET, FILM COATED ORAL
Qty: 6 TABLET | Refills: 0 | Status: SHIPPED | OUTPATIENT
Start: 2020-10-11 | End: 2020-10-15

## 2020-10-13 ENCOUNTER — OFFICE VISIT (OUTPATIENT)
Dept: FAMILY MEDICINE CLINIC | Facility: CLINIC | Age: 50
End: 2020-10-13
Payer: OTHER GOVERNMENT

## 2020-10-13 VITALS
WEIGHT: 213 LBS | SYSTOLIC BLOOD PRESSURE: 138 MMHG | BODY MASS INDEX: 37.74 KG/M2 | TEMPERATURE: 97.2 F | DIASTOLIC BLOOD PRESSURE: 62 MMHG | HEART RATE: 102 BPM | HEIGHT: 63 IN | OXYGEN SATURATION: 99 %

## 2020-10-13 DIAGNOSIS — K21.00 GASTROESOPHAGEAL REFLUX DISEASE WITH ESOPHAGITIS WITHOUT HEMORRHAGE: Primary | ICD-10-CM

## 2020-10-13 DIAGNOSIS — R91.8 PULMONARY NODULES: ICD-10-CM

## 2020-10-13 DIAGNOSIS — J01.00 ACUTE MAXILLARY SINUSITIS, RECURRENCE NOT SPECIFIED: ICD-10-CM

## 2020-10-13 DIAGNOSIS — J30.1 NON-SEASONAL ALLERGIC RHINITIS DUE TO POLLEN: ICD-10-CM

## 2020-10-13 PROCEDURE — 99214 OFFICE O/P EST MOD 30 MIN: CPT | Performed by: FAMILY MEDICINE

## 2020-10-13 RX ORDER — LEVALBUTEROL TARTRATE 45 UG/1
1-2 AEROSOL, METERED ORAL EVERY 4 HOURS PRN
Qty: 1 INHALER | Refills: 1 | Status: SHIPPED | OUTPATIENT
Start: 2020-10-13 | End: 2020-11-18 | Stop reason: ALTCHOICE

## 2020-10-14 ENCOUNTER — TELEPHONE (OUTPATIENT)
Dept: FAMILY MEDICINE CLINIC | Facility: CLINIC | Age: 50
End: 2020-10-14

## 2020-11-18 PROBLEM — J30.81 ALLERGIC RHINITIS DUE TO ANIMAL (CAT) (DOG) HAIR AND DANDER: Status: ACTIVE | Noted: 2020-11-18

## 2020-11-18 PROBLEM — J30.1 ALLERGIC RHINITIS DUE TO POLLEN: Status: ACTIVE | Noted: 2020-11-18

## 2020-11-18 PROBLEM — R09.81 NASAL CONGESTION: Status: ACTIVE | Noted: 2020-11-18

## 2020-11-18 PROBLEM — J30.89 ALLERGIC RHINITIS DUE TO DUST: Status: ACTIVE | Noted: 2020-11-18

## 2020-12-30 ENCOUNTER — HOSPITAL ENCOUNTER (EMERGENCY)
Facility: HOSPITAL | Age: 50
Discharge: HOME | End: 2020-12-30
Attending: EMERGENCY MEDICINE
Payer: OTHER GOVERNMENT

## 2020-12-30 ENCOUNTER — APPOINTMENT (EMERGENCY)
Dept: RADIOLOGY | Facility: HOSPITAL | Age: 50
End: 2020-12-30
Payer: OTHER GOVERNMENT

## 2020-12-30 VITALS
SYSTOLIC BLOOD PRESSURE: 133 MMHG | RESPIRATION RATE: 20 BRPM | BODY MASS INDEX: 40.58 KG/M2 | WEIGHT: 229.06 LBS | TEMPERATURE: 97.8 F | HEART RATE: 84 BPM | OXYGEN SATURATION: 98 % | DIASTOLIC BLOOD PRESSURE: 77 MMHG

## 2020-12-30 DIAGNOSIS — R79.89 ELEVATED D-DIMER: ICD-10-CM

## 2020-12-30 DIAGNOSIS — R60.9 PERIPHERAL EDEMA: ICD-10-CM

## 2020-12-30 DIAGNOSIS — R07.9 CHEST PAIN: Primary | ICD-10-CM

## 2020-12-30 LAB
ALBUMIN SERPL BCP-MCNC: 3.5 G/DL (ref 3.5–5)
ALP SERPL-CCNC: 125 U/L (ref 46–116)
ALT SERPL W P-5'-P-CCNC: 36 U/L (ref 12–78)
ANION GAP SERPL CALCULATED.3IONS-SCNC: 7 MMOL/L (ref 4–13)
APTT PPP: 31 SECONDS (ref 23–37)
AST SERPL W P-5'-P-CCNC: 23 U/L (ref 5–45)
ATRIAL RATE: 98 BPM
BASOPHILS # BLD AUTO: 0.03 THOUSANDS/ΜL (ref 0–0.1)
BASOPHILS NFR BLD AUTO: 0 % (ref 0–1)
BILIRUB SERPL-MCNC: 0.44 MG/DL (ref 0.2–1)
BUN SERPL-MCNC: 9 MG/DL (ref 5–25)
CALCIUM SERPL-MCNC: 9.2 MG/DL (ref 8.3–10.1)
CHLORIDE SERPL-SCNC: 105 MMOL/L (ref 100–108)
CO2 SERPL-SCNC: 28 MMOL/L (ref 21–32)
CREAT SERPL-MCNC: 0.81 MG/DL (ref 0.6–1.3)
D DIMER PPP FEU-MCNC: 0.95 UG/ML FEU
EOSINOPHIL # BLD AUTO: 0.26 THOUSAND/ΜL (ref 0–0.61)
EOSINOPHIL NFR BLD AUTO: 3 % (ref 0–6)
ERYTHROCYTE [DISTWIDTH] IN BLOOD BY AUTOMATED COUNT: 13.2 % (ref 11.6–15.1)
GFR SERPL CREATININE-BSD FRML MDRD: 84 ML/MIN/1.73SQ M
GLUCOSE SERPL-MCNC: 126 MG/DL (ref 65–140)
HCT VFR BLD AUTO: 40.6 % (ref 34.8–46.1)
HGB BLD-MCNC: 12.7 G/DL (ref 11.5–15.4)
IMM GRANULOCYTES # BLD AUTO: 0.05 THOUSAND/UL (ref 0–0.2)
IMM GRANULOCYTES NFR BLD AUTO: 1 % (ref 0–2)
INR PPP: 0.97 (ref 0.84–1.19)
LIPASE SERPL-CCNC: 139 U/L (ref 73–393)
LYMPHOCYTES # BLD AUTO: 1.36 THOUSANDS/ΜL (ref 0.6–4.47)
LYMPHOCYTES NFR BLD AUTO: 17 % (ref 14–44)
MAGNESIUM SERPL-MCNC: 2 MG/DL (ref 1.6–2.6)
MCH RBC QN AUTO: 28.9 PG (ref 26.8–34.3)
MCHC RBC AUTO-ENTMCNC: 31.3 G/DL (ref 31.4–37.4)
MCV RBC AUTO: 92 FL (ref 82–98)
MONOCYTES # BLD AUTO: 0.35 THOUSAND/ΜL (ref 0.17–1.22)
MONOCYTES NFR BLD AUTO: 4 % (ref 4–12)
NEUTROPHILS # BLD AUTO: 6.14 THOUSANDS/ΜL (ref 1.85–7.62)
NEUTS SEG NFR BLD AUTO: 75 % (ref 43–75)
NRBC BLD AUTO-RTO: 0 /100 WBCS
NT-PROBNP SERPL-MCNC: 27 PG/ML
P AXIS: 58 DEGREES
PLATELET # BLD AUTO: 274 THOUSANDS/UL (ref 149–390)
PMV BLD AUTO: 10.1 FL (ref 8.9–12.7)
POTASSIUM SERPL-SCNC: 3.7 MMOL/L (ref 3.5–5.3)
PR INTERVAL: 144 MS
PROT SERPL-MCNC: 7.8 G/DL (ref 6.4–8.2)
PROTHROMBIN TIME: 12.7 SECONDS (ref 11.6–14.5)
QRS AXIS: 0 DEGREES
QRSD INTERVAL: 76 MS
QT INTERVAL: 332 MS
QTC INTERVAL: 423 MS
RBC # BLD AUTO: 4.4 MILLION/UL (ref 3.81–5.12)
SODIUM SERPL-SCNC: 140 MMOL/L (ref 136–145)
T WAVE AXIS: 13 DEGREES
TROPONIN I SERPL-MCNC: <0.02 NG/ML
TROPONIN I SERPL-MCNC: <0.02 NG/ML
VENTRICULAR RATE: 98 BPM
WBC # BLD AUTO: 8.19 THOUSAND/UL (ref 4.31–10.16)

## 2020-12-30 PROCEDURE — 93010 ELECTROCARDIOGRAM REPORT: CPT | Performed by: INTERNAL MEDICINE

## 2020-12-30 PROCEDURE — 85025 COMPLETE CBC W/AUTO DIFF WBC: CPT | Performed by: EMERGENCY MEDICINE

## 2020-12-30 PROCEDURE — 83880 ASSAY OF NATRIURETIC PEPTIDE: CPT | Performed by: EMERGENCY MEDICINE

## 2020-12-30 PROCEDURE — 93005 ELECTROCARDIOGRAM TRACING: CPT

## 2020-12-30 PROCEDURE — 36415 COLL VENOUS BLD VENIPUNCTURE: CPT | Performed by: EMERGENCY MEDICINE

## 2020-12-30 PROCEDURE — 80053 COMPREHEN METABOLIC PANEL: CPT | Performed by: EMERGENCY MEDICINE

## 2020-12-30 PROCEDURE — 85610 PROTHROMBIN TIME: CPT | Performed by: EMERGENCY MEDICINE

## 2020-12-30 PROCEDURE — 99285 EMERGENCY DEPT VISIT HI MDM: CPT

## 2020-12-30 PROCEDURE — 71045 X-RAY EXAM CHEST 1 VIEW: CPT

## 2020-12-30 PROCEDURE — 84484 ASSAY OF TROPONIN QUANT: CPT | Performed by: EMERGENCY MEDICINE

## 2020-12-30 PROCEDURE — 85379 FIBRIN DEGRADATION QUANT: CPT | Performed by: EMERGENCY MEDICINE

## 2020-12-30 PROCEDURE — 83735 ASSAY OF MAGNESIUM: CPT | Performed by: EMERGENCY MEDICINE

## 2020-12-30 PROCEDURE — 99285 EMERGENCY DEPT VISIT HI MDM: CPT | Performed by: EMERGENCY MEDICINE

## 2020-12-30 PROCEDURE — 83690 ASSAY OF LIPASE: CPT | Performed by: EMERGENCY MEDICINE

## 2020-12-30 PROCEDURE — 85730 THROMBOPLASTIN TIME PARTIAL: CPT | Performed by: EMERGENCY MEDICINE

## 2020-12-30 RX ORDER — LIDOCAINE HYDROCHLORIDE 20 MG/ML
10 SOLUTION OROPHARYNGEAL ONCE
Status: COMPLETED | OUTPATIENT
Start: 2020-12-30 | End: 2020-12-30

## 2020-12-30 RX ORDER — MAGNESIUM HYDROXIDE/ALUMINUM HYDROXICE/SIMETHICONE 120; 1200; 1200 MG/30ML; MG/30ML; MG/30ML
30 SUSPENSION ORAL ONCE
Status: COMPLETED | OUTPATIENT
Start: 2020-12-30 | End: 2020-12-30

## 2020-12-30 RX ORDER — MULTIVIT WITH MINERALS/LUTEIN
1000 TABLET ORAL DAILY
COMMUNITY

## 2020-12-30 RX ADMIN — LIDOCAINE HYDROCHLORIDE 10 ML: 20 SOLUTION ORAL; TOPICAL at 10:55

## 2020-12-30 RX ADMIN — ALUMINUM HYDROXIDE, MAGNESIUM HYDROXIDE, AND SIMETHICONE 30 ML: 200; 200; 20 SUSPENSION ORAL at 10:55

## 2020-12-30 NOTE — ED PROVIDER NOTES
History  Chief Complaint   Patient presents with    Chest Pain     patient reports feeling palpitations before bed without any pain, went to bed and chest pain awoke her at 4am         History provided by:  Patient   used: No    Chest Pain  Pain location:  Substernal area  Pain quality: sharp    Pain quality comment:  Squeezing  Pain radiates to:  Does not radiate  Pain radiates to the back: no    Pain severity:  Moderate  Onset quality:  Sudden  Duration: since 4 am   Timing:  Constant  Progression:  Unchanged  Chronicity:  New  Context: at rest    Context comment:  Patient was sleeping when this occurred  Had palpitations last night going to bed which she describes as feeling like her heart beat is stronger  Relieved by:  Nothing  Worsened by:  Nothing tried  Ineffective treatments:  Antacids  Associated symptoms: lower extremity edema, nausea and palpitations    Associated symptoms: no abdominal pain, no back pain, no cough, no fever, no headache, no numbness, no shortness of breath, not vomiting and no weakness    Risk factors: high cholesterol, obesity and prior DVT/PE    Risk factors: no birth control, no coronary artery disease, no hypertension, no immobilization, not male and no smoking    Risk factors comment:  Borderline blood sugar      Prior to Admission Medications   Prescriptions Last Dose Informant Patient Reported? Taking?    APPLE CIDER VINEGAR PO   Yes Yes   Sig: Take by mouth   Ascorbic Acid (vitamin C) 1000 MG tablet   Yes Yes   Sig: Take 1,000 mg by mouth daily   B Complex Vitamins (VITAMIN B COMPLEX) TABS   Yes Yes   Sig: Take by mouth   Cholecalciferol (VITAMIN D) 2000 units CAPS   Yes Yes   Sig: Take by mouth   EPINEPHrine (EPIPEN) 0 3 mg/0 3 mL SOAJ   No No   Sig: Inject 0 3 mL (0 3 mg total) into a muscle once for 1 dose   Magnesium 100 MG TABS   Yes Yes   Sig: Take by mouth   TURMERIC PO   Yes No   Sig: Take by mouth   amitriptyline (ELAVIL) 10 mg tablet   Yes Yes ammonium lactate (LAC-HYDRIN) 12 % cream   No No   Sig: Apply topically as needed for dry skin   atorvastatin (LIPITOR) 10 mg tablet   No Yes   Sig: TAKE 1 TABLET AT BEDTIME   azelastine (ASTELIN) 0 1 % nasal spray   No No   Si spray into each nostril 2 (two) times a day Use in each nostril as directed   fluticasone (FLONASE) 50 mcg/act nasal spray   No No   Si spray into each nostril 2 (two) times a day   omega-3-acid ethyl esters (LOVAZA) 1 g capsule   Yes Yes   Sig: Take 2 g by mouth 2 (two) times a day   omeprazole (PriLOSEC) 40 MG capsule   Yes No      Facility-Administered Medications: None       Past Medical History:   Diagnosis Date    Allergic rhinitis     GERD (gastroesophageal reflux disease)     Hyperlipidemia     Migraine     Nasal congestion     Sleep apnea     on CPAP treatment    Sleep difficulties        Past Surgical History:   Procedure Laterality Date     SECTION  1991    CHOLECYSTECTOMY  2013    HEMORRHOID SURGERY  1998    TUBAL LIGATION  2007    WISDOM TOOTH EXTRACTION         Family History   Problem Relation Age of Onset    Alzheimer's disease Father     Diabetes Father     Leukemia Mother      I have reviewed and agree with the history as documented  E-Cigarette/Vaping    E-Cigarette Use Never User      E-Cigarette/Vaping Substances    Nicotine No     THC No     CBD No     Flavoring No     Other No     Unknown No      Social History     Tobacco Use    Smoking status: Never Smoker    Smokeless tobacco: Never Used   Substance Use Topics    Alcohol use: No     Comment: SOCIAL DRINKER    Drug use: No       Review of Systems   Constitutional: Negative for chills and fever  HENT: Negative for congestion  Respiratory: Negative for cough, chest tightness and shortness of breath  Cardiovascular: Positive for chest pain and palpitations  Negative for leg swelling  Gastrointestinal: Positive for nausea   Negative for abdominal pain, diarrhea and vomiting  Genitourinary: Negative for difficulty urinating  Musculoskeletal: Negative for back pain  Neurological: Negative for weakness, numbness and headaches  All other systems reviewed and are negative  Physical Exam  Physical Exam  Vitals signs and nursing note reviewed  Constitutional:       General: She is not in acute distress  Appearance: Normal appearance  She is well-developed  She is not ill-appearing, toxic-appearing or diaphoretic  HENT:      Head: Normocephalic and atraumatic  Comments: Wearing a mask     Right Ear: Hearing normal  No drainage or swelling  Left Ear: Hearing normal  No drainage or swelling  Eyes:      General: Lids are normal          Right eye: No discharge  Left eye: No discharge  Conjunctiva/sclera: Conjunctivae normal    Neck:      Musculoskeletal: Normal range of motion  Vascular: No JVD  Trachea: Trachea normal    Cardiovascular:      Rate and Rhythm: Normal rate and regular rhythm  Pulses: Normal pulses  Heart sounds: Normal heart sounds  No murmur  No friction rub  No gallop  Pulmonary:      Effort: Pulmonary effort is normal  No respiratory distress  Breath sounds: Normal breath sounds  No stridor  No wheezing or rales  Comments: Tenderness is not reproducible  Has tenderness at costochondral junction  Chest:      Chest wall: Tenderness present  Abdominal:      Palpations: Abdomen is soft  Tenderness: There is no abdominal tenderness  There is no guarding or rebound  Musculoskeletal: Normal range of motion  General: No tenderness or deformity  Right lower leg: Edema present  Left lower leg: Edema present  Comments: Trace edema     Skin:     General: Skin is warm and dry  Coloration: Skin is not pale  Findings: No rash  Neurological:      General: No focal deficit present  Mental Status: She is alert  GCS: GCS eye subscore is 4   GCS verbal subscore is 5  GCS motor subscore is 6  Sensory: No sensory deficit  Motor: No abnormal muscle tone  Psychiatric:         Mood and Affect: Mood normal          Speech: Speech normal          Behavior: Behavior is cooperative           Vital Signs  ED Triage Vitals [12/30/20 1017]   Temperature Pulse Respirations Blood Pressure SpO2   97 8 °F (36 6 °C) 88 18 170/78 100 %      Temp Source Heart Rate Source Patient Position - Orthostatic VS BP Location FiO2 (%)   Oral Monitor Lying Right arm --      Pain Score       7           Vitals:    12/30/20 1038 12/30/20 1130 12/30/20 1230 12/30/20 1330   BP: 147/66 137/65 131/66 133/77   Pulse:  84 80 84   Patient Position - Orthostatic VS:   Lying          Visual Acuity      ED Medications  Medications   aluminum-magnesium hydroxide-simethicone (MYLANTA) oral suspension 30 mL (30 mL Oral Given 12/30/20 1055)   Lidocaine Viscous HCl (XYLOCAINE) 2 % mucosal solution 10 mL (10 mL Swish & Swallow Given 12/30/20 1055)       Diagnostic Studies  Results Reviewed     Procedure Component Value Units Date/Time    Troponin I repeat in 3hrs [[id number]]  (Normal) Collected: 12/30/20 1344    Lab Status: Final result Specimen: Blood from Arm, Right Updated: 12/30/20 1410     Troponin I <0 02 ng/mL     D-Dimer [[id number]]  (Abnormal) Collected: 12/30/20 1054    Lab Status: Final result Specimen: Blood from Arm, Right Updated: 12/30/20 1146     D-Dimer, Quant 0 95 ug/ml FEU     Lipase [[id number]]  (Normal) Collected: 12/30/20 1054    Lab Status: Final result Specimen: Blood from Arm, Right Updated: 12/30/20 1143     Lipase 139 u/L     Magnesium [[id number]]  (Normal) Collected: 12/30/20 1054    Lab Status: Final result Specimen: Blood from Arm, Right Updated: 12/30/20 1143     Magnesium 2 0 mg/dL     NT-BNP PRO [[id number]]  (Normal) Collected: 12/30/20 1054    Lab Status: Final result Specimen: Blood from Arm, Right Updated: 12/30/20 1143     NT-proBNP 27 pg/mL     Protime-INR [[id number]]  (Normal) Collected: 12/30/20 1054    Lab Status: Final result Specimen: Blood from Arm, Right Updated: 12/30/20 1142     Protime 12 7 seconds      INR 0 97    APTT [[id number]]  (Normal) Collected: 12/30/20 1054    Lab Status: Final result Specimen: Blood from Arm, Right Updated: 12/30/20 1142     PTT 31 seconds     Troponin I [[id number]]  (Normal) Collected: 12/30/20 1054    Lab Status: Final result Specimen: Blood from Arm, Right Updated: 12/30/20 1140     Troponin I <0 02 ng/mL     Comprehensive metabolic panel [[id number]]  (Abnormal) Collected: 12/30/20 1054    Lab Status: Final result Specimen: Blood from Arm, Right Updated: 12/30/20 1136     Sodium 140 mmol/L      Potassium 3 7 mmol/L      Chloride 105 mmol/L      CO2 28 mmol/L      ANION GAP 7 mmol/L      BUN 9 mg/dL      Creatinine 0 81 mg/dL      Glucose 126 mg/dL      Calcium 9 2 mg/dL      AST 23 U/L      ALT 36 U/L      Alkaline Phosphatase 125 U/L      Total Protein 7 8 g/dL      Albumin 3 5 g/dL      Total Bilirubin 0 44 mg/dL      eGFR 84 ml/min/1 73sq m     Narrative:      [place] guidelines for Chronic Kidney Disease (CKD):     Stage 1 with normal or high GFR (GFR > 90 mL/min/1 73 square meters)    Stage 2 Mild CKD (GFR = 60-89 mL/min/1 73 square meters)    Stage 3A Moderate CKD (GFR = 45-59 mL/min/1 73 square meters)    Stage 3B Moderate CKD (GFR = 30-44 mL/min/1 73 square meters)    Stage 4 Severe CKD (GFR = 15-29 mL/min/1 73 square meters)    Stage 5 End Stage CKD (GFR <15 mL/min/1 73 square meters)  Note: GFR calculation is accurate only with a steady state creatinine    CBC and differential [[id number]]  (Abnormal) Collected: 12/30/20 1054    Lab Status: Final result Specimen: Blood from Arm, Right Updated: 12/30/20 1107     WBC 8 19 Thousand/uL      RBC 4 40 Million/uL      Hemoglobin 12 7 g/dL      Hematocrit 40 6 %      MCV 92 fL      MCH 28 9 pg      MCHC 31 3 g/dL      RDW 13 2 %      MPV 10 1 fL Platelets 774 Thousands/uL      nRBC 0 /100 WBCs      Neutrophils Relative 75 %      Immat GRANS % 1 %      Lymphocytes Relative 17 %      Monocytes Relative 4 %      Eosinophils Relative 3 %      Basophils Relative 0 %      Neutrophils Absolute 6 14 Thousands/µL      Immature Grans Absolute 0 05 Thousand/uL      Lymphocytes Absolute 1 36 Thousands/µL      Monocytes Absolute 0 35 Thousand/µL      Eosinophils Absolute 0 26 Thousand/µL      Basophils Absolute 0 03 Thousands/µL              I have personally reviewed the x-ray and my findings are: no acute disease  XR chest 1 view portable   ED Interpretation by Emy Orantes MD (12/30 1420)   I have personally reviewed the x-ray and my findings are: no acute disease  Final Result by Zulma Wilhelm MD (12/30 1216)      No acute cardiopulmonary disease                    Workstation performed: [id number]                    Procedures  ECG 12 Lead Documentation Only    Date/Time: 12/30/2020 2:17 PM  Performed by: Emy Orantes MD  Authorized by: Emy Orantes MD     ECG reviewed by me, the ED Provider: yes    Patient location:  ED  Previous ECG:     Comparison to cardiac monitor: Yes    Interpretation:     Interpretation: normal    Rate:     ECG rate assessment: normal    Rhythm:     Rhythm: sinus rhythm    Ectopy:     Ectopy: none    QRS:     QRS axis:  Normal  Conduction:     Conduction: normal    ST segments:     ST segments:  Normal  T waves:     T waves: normal    ECG 12 Lead Documentation Only    Date/Time: 12/30/2020 10:30 AM  Performed by: Emy Orantes MD  Authorized by: Emy Orantes MD     Indications / Diagnosis:  Cp   ECG reviewed by me, the ED Provider: yes    Patient location:  ED  Previous ECG:     Comparison to cardiac monitor: Yes    Interpretation:     Interpretation: normal    Rate:     ECG rate assessment: normal    Rhythm:     Rhythm: sinus rhythm    Ectopy:     Ectopy: none    QRS: QRS axis:  Normal  Conduction:     Conduction: normal    ST segments:     ST segments:  Normal  T waves:     T waves: normal               ED Course  ED Course as of Dec 30 1422   Wed Dec 30, 2020   1307 Patient's D-dimer was elevated  After discussion with her she elected to go with a V/Q scan because her dye allergy  There is no ability to get a nuclear medicine test today  We then discussed the risks and benefits of CT scan and the fact that she would need a prep and she does not want to go through the prepping getting the CT scan  We do need to get a repeat troponin at 1:40 p m  HEART Risk Score      Most Recent Value   Heart Score Risk Calculator   History  0 Filed at: 12/30/2020 1420   ECG  0 Filed at: 12/30/2020 1420   Age  1 Filed at: 12/30/2020 1420   Risk Factors  1 Filed at: 12/30/2020 1420   Troponin  0 Filed at: 12/30/2020 1420   HEART Score  2 Filed at: 12/30/2020 1420                      SBIRT 20yo+      Most Recent Value   SBIRT (23 yo +)   In order to provide better care to our patients, we are screening all of our patients for alcohol and drug use  Would it be okay to ask you these screening questions? Yes Filed at: 12/30/2020 1021   Initial Alcohol Screen: US AUDIT-C    1  How often do you have a drink containing alcohol?  0 Filed at: 12/30/2020 1021   2  How many drinks containing alcohol do you have on a typical day you are drinking? 0 Filed at: 12/30/2020 1021   3a  Male UNDER 65: How often do you have five or more drinks on one occasion? 0 Filed at: 12/30/2020 1021   3b  FEMALE Any Age, or MALE 65+: How often do you have 4 or more drinks on one occassion? 0 Filed at: 12/30/2020 1021   Audit-C Score  0 Filed at: 12/30/2020 1021   AVNI: How many times in the past year have you    Used an illegal drug or used a prescription medication for non-medical reasons?   Never Filed at: 12/30/2020 1021                    MDM  Number of Diagnoses or Management Options  Chest pain: Elevated d-dimer:   Peripheral edema:   Diagnosis management comments: Chest pain evaluation  Rather atypical for cardiac potentially could be reflux  Given the short nature D-dimer testing was initiated as the patient has had a PE in the past   It was elevated mildly  She is not tachycardic or hypoxic  She has no lower extremity tenderness but does have some bilateral slightly pitting edema which is been going on for some time  After discussion with the patient she elected not to undergo a CT scan as it did not feel like her prior PE  She has no acute chest x-ray findings  She has a family doctor to follow-up with  Amount and/or Complexity of Data Reviewed  Clinical lab tests: ordered and reviewed  Tests in the radiology section of CPT®: ordered and reviewed  Tests in the medicine section of CPT®: ordered and reviewed  Independent visualization of images, tracings, or specimens: yes    Patient Progress  Patient progress: stable      Disposition  Final diagnoses:   Chest pain   Elevated d-dimer   Peripheral edema     Time reflects when diagnosis was documented in both MDM as applicable and the Disposition within this note     Time User Action Codes Description Comment    12/30/2020  2:11 PM Zack Fey Add [R07 9] Chest pain     12/30/2020  2:11 PM Zack Fey Add [R79 89] Elevated d-dimer     12/30/2020  2:11 PM Zack Fey Add [R60 9] Peripheral edema       ED Disposition     ED Disposition Condition Date/Time Comment    Discharge Stable Wed Dec 30, 2020  2:11 PM John Block discharge to home/self care              Follow-up Information     Follow up With Specialties Details Why Contact Info    Mansoor Kelly MD Family Medicine Schedule an appointment as soon as possible for a visit in 2 days If symptoms worsen [address]  [phone]            Current Discharge Medication List      CONTINUE these medications which have NOT CHANGED    Details   amitriptyline (ELAVIL) 10 mg tablet       APPLE CIDER VINEGAR PO Take by mouth      Ascorbic Acid (vitamin C) 1000 MG tablet Take 1,000 mg by mouth daily      atorvastatin (LIPITOR) 10 mg tablet TAKE 1 TABLET AT BEDTIME  Qty: 90 tablet, Refills: 4    Associated Diagnoses: Hyperlipidemia, unspecified hyperlipidemia type      B Complex Vitamins (VITAMIN B COMPLEX) TABS Take by mouth      Cholecalciferol (VITAMIN D) 2000 units CAPS Take by mouth      Magnesium 100 MG TABS Take by mouth      omega-3-acid ethyl esters (LOVAZA) 1 g capsule Take 2 g by mouth 2 (two) times a day      ammonium lactate (LAC-HYDRIN) 12 % cream Apply topically as needed for dry skin  Qty: 385 g, Refills: 2    Associated Diagnoses: Xerosis of skin      azelastine (ASTELIN) 0 1 % nasal spray 1 spray into each nostril 2 (two) times a day Use in each nostril as directed  Qty: 3 Bottle, Refills: 1    Associated Diagnoses: Acute non-recurrent frontal sinusitis      EPINEPHrine (EPIPEN) 0 3 mg/0 3 mL SOAJ Inject 0 3 mL (0 3 mg total) into a muscle once for 1 dose  Qty: 0 6 mL, Refills: 0    Associated Diagnoses: Seasonal allergic rhinitis due to pollen; Allergic rhinitis due to animal hair and dander; Allergic rhinitis due to dust mite      fluticasone (FLONASE) 50 mcg/act nasal spray 1 spray into each nostril 2 (two) times a day  Qty: 1 Bottle, Refills: 0    Associated Diagnoses: Acute sinusitis, recurrence not specified, unspecified location      omeprazole (PriLOSEC) 40 MG capsule       TURMERIC PO Take by mouth           No discharge procedures on file      PDMP Review     None          ED Provider  Electronically Signed by           Maxx De Jesus MD  12/30/20 3804

## 2020-12-31 LAB
ATRIAL RATE: 72 BPM
P AXIS: 40 DEGREES
PR INTERVAL: 144 MS
QRS AXIS: 0 DEGREES
QRSD INTERVAL: 76 MS
QT INTERVAL: 370 MS
QTC INTERVAL: 405 MS
T WAVE AXIS: 23 DEGREES
VENTRICULAR RATE: 72 BPM

## 2020-12-31 PROCEDURE — 93010 ELECTROCARDIOGRAM REPORT: CPT | Performed by: INTERNAL MEDICINE

## 2021-01-06 DIAGNOSIS — E78.5 HYPERLIPIDEMIA, UNSPECIFIED HYPERLIPIDEMIA TYPE: ICD-10-CM

## 2021-01-06 RX ORDER — ATORVASTATIN CALCIUM 10 MG/1
TABLET, FILM COATED ORAL
Qty: 90 TABLET | Refills: 3 | Status: SHIPPED | OUTPATIENT
Start: 2021-01-06 | End: 2022-01-04

## 2021-04-16 DIAGNOSIS — L85.3 XEROSIS OF SKIN: ICD-10-CM

## 2021-04-16 RX ORDER — AMMONIUM LACTATE 12 G/100G
CREAM TOPICAL AS NEEDED
Qty: 280 G | Refills: 2 | Status: SHIPPED | OUTPATIENT
Start: 2021-04-16 | End: 2021-11-23

## 2021-06-16 ENCOUNTER — OFFICE VISIT (OUTPATIENT)
Dept: FAMILY MEDICINE CLINIC | Facility: CLINIC | Age: 51
End: 2021-06-16
Payer: OTHER GOVERNMENT

## 2021-06-16 VITALS
WEIGHT: 214 LBS | HEIGHT: 63 IN | DIASTOLIC BLOOD PRESSURE: 80 MMHG | SYSTOLIC BLOOD PRESSURE: 110 MMHG | BODY MASS INDEX: 37.92 KG/M2

## 2021-06-16 DIAGNOSIS — I87.2 VENOUS INSUFFICIENCY: Primary | ICD-10-CM

## 2021-06-16 PROCEDURE — 99213 OFFICE O/P EST LOW 20 MIN: CPT | Performed by: FAMILY MEDICINE

## 2021-06-16 RX ORDER — ONDANSETRON 4 MG/1
TABLET, ORALLY DISINTEGRATING ORAL
COMMUNITY

## 2021-06-16 RX ORDER — AMITRIPTYLINE HYDROCHLORIDE 25 MG/1
TABLET, FILM COATED ORAL
COMMUNITY
Start: 2020-12-29

## 2021-06-16 RX ORDER — FOLIC ACID 0.8 MG
TABLET ORAL
COMMUNITY
End: 2021-07-13

## 2021-06-16 RX ORDER — AMITRIPTYLINE HYDROCHLORIDE 25 MG/1
TABLET, FILM COATED ORAL
COMMUNITY
Start: 2021-06-08 | End: 2021-07-13

## 2021-06-16 RX ORDER — CETIRIZINE HYDROCHLORIDE 10 MG/1
10 TABLET ORAL DAILY
COMMUNITY
End: 2022-03-31

## 2021-06-18 PROBLEM — I87.2 VENOUS INSUFFICIENCY: Status: ACTIVE | Noted: 2021-06-18

## 2021-06-18 NOTE — PROGRESS NOTES
Assessment/Plan:     64-year-old woman with: Venous insufficiency discussed limiting sodium intake elevating legs and compression gave sample of Tubigrip discussed supportive care return parameters otherwise    No problem-specific Assessment & Plan notes found for this encounter  Diagnoses and all orders for this visit:    Venous insufficiency    Other orders  -     cetirizine (ZyrTEC) 10 mg tablet; Take 10 mg by mouth daily  -     Magnesium 500 MG CAPS; Magnesium CAPS    Refills: 0       Active  -     ondansetron (ZOFRAN-ODT) 4 mg disintegrating tablet; Take 1 tablet every 8 hours as needed for nauea  -     amitriptyline (ELAVIL) 25 mg tablet  -     amitriptyline (ELAVIL) 25 mg tablet  -     Cholecalciferol 100 MCG (4000 UT) CAPS; Take by mouth          Subjective:     Chief Complaint   Patient presents with    Foot Swelling     both ankles and feet swollen with pain the longer she walks the worse it gets or when she sits too long         Patient ID: Darrin Weathers is a 44 y o  female  Patient is a 64-year-old woman who presents complaining of swelling in her legs getting worse over the past several weeks no fevers chills nausea vomiting no chest pain shortness of breath      The following portions of the patient's history were reviewed and updated as appropriate: allergies, current medications, past family history, past medical history, past social history, past surgical history and problem list     Review of Systems   Constitutional: Negative  HENT: Negative  Eyes: Negative  Respiratory: Negative  Cardiovascular: Negative  Gastrointestinal: Negative  Endocrine: Negative  Genitourinary: Negative  Musculoskeletal: Negative  Allergic/Immunologic: Negative  Neurological: Negative  Hematological: Negative  Psychiatric/Behavioral: Negative  All other systems reviewed and are negative          Objective:      /80 (BP Location: Left arm, Patient Position: Sitting, Cuff Size: Large)   Ht 5' 3" (1 6 m)   Wt 97 1 kg (214 lb)   LMP 01/12/2018 (Exact Date)   BMI 37 91 kg/m²          Physical Exam  Constitutional:       Appearance: She is well-developed  HENT:      Head: Atraumatic  Right Ear: External ear normal       Left Ear: External ear normal    Eyes:      Conjunctiva/sclera: Conjunctivae normal       Pupils: Pupils are equal, round, and reactive to light  Pulmonary:      Effort: Pulmonary effort is normal  No respiratory distress  Abdominal:      General: There is no distension  Musculoskeletal:         General: Normal range of motion  Cervical back: Normal range of motion  Skin:     General: Skin is warm and dry  Neurological:      Mental Status: She is alert and oriented to person, place, and time  Cranial Nerves: No cranial nerve deficit  Psychiatric:         Behavior: Behavior normal          Thought Content:  Thought content normal          Judgment: Judgment normal

## 2021-07-13 ENCOUNTER — OFFICE VISIT (OUTPATIENT)
Dept: FAMILY MEDICINE CLINIC | Facility: CLINIC | Age: 51
End: 2021-07-13
Payer: OTHER GOVERNMENT

## 2021-07-13 VITALS
SYSTOLIC BLOOD PRESSURE: 122 MMHG | BODY MASS INDEX: 38.2 KG/M2 | WEIGHT: 215.6 LBS | OXYGEN SATURATION: 99 % | DIASTOLIC BLOOD PRESSURE: 80 MMHG | TEMPERATURE: 96.2 F | HEART RATE: 90 BPM | HEIGHT: 63 IN

## 2021-07-13 DIAGNOSIS — J01.00 ACUTE NON-RECURRENT MAXILLARY SINUSITIS: ICD-10-CM

## 2021-07-13 DIAGNOSIS — R91.8 PULMONARY NODULES: ICD-10-CM

## 2021-07-13 DIAGNOSIS — F41.9 ANXIETY: ICD-10-CM

## 2021-07-13 DIAGNOSIS — Z01.818 PREOP EXAMINATION: Primary | ICD-10-CM

## 2021-07-13 DIAGNOSIS — E78.00 HYPERCHOLESTEROLEMIA: ICD-10-CM

## 2021-07-13 DIAGNOSIS — K21.00 GASTROESOPHAGEAL REFLUX DISEASE WITH ESOPHAGITIS WITHOUT HEMORRHAGE: ICD-10-CM

## 2021-07-13 PROBLEM — S83.221A PERIPHERAL TEAR OF MEDIAL MENISCUS OF RIGHT KNEE AS CURRENT INJURY: Status: ACTIVE | Noted: 2021-07-13

## 2021-07-13 PROCEDURE — 99214 OFFICE O/P EST MOD 30 MIN: CPT | Performed by: FAMILY MEDICINE

## 2021-07-13 PROCEDURE — 93000 ELECTROCARDIOGRAM COMPLETE: CPT | Performed by: FAMILY MEDICINE

## 2021-07-13 RX ORDER — AZITHROMYCIN 250 MG/1
TABLET, FILM COATED ORAL
Qty: 6 TABLET | Refills: 0 | Status: SHIPPED | OUTPATIENT
Start: 2021-07-13 | End: 2021-07-17

## 2021-07-13 RX ORDER — ALPRAZOLAM 0.25 MG/1
0.25 TABLET ORAL 2 TIMES DAILY PRN
Qty: 20 TABLET | Refills: 0 | Status: SHIPPED | OUTPATIENT
Start: 2021-07-13 | End: 2022-03-31

## 2021-07-13 NOTE — PROGRESS NOTES
Assessment/Plan:  EKG done this time  EKG shows normal sinus rhythm with sinus arrhythmia  Patient low risk for cardiopulmonary event  Okay to proceed with surgery  Patient go for CT scan of pulmonary nodule  Patient use Z-Jeovany as directed for sinusitis  Labs and previous EKG reviewed as well as chest x-ray  No aspirin or Motrin products 1 week prior to surgery  O2 sat 99% room air       Diagnoses and all orders for this visit:    Preop examination  -     POCT ECG    Gastroesophageal reflux disease with esophagitis without hemorrhage    Hypercholesterolemia    Pulmonary nodules  -     CT chest wo contrast; Future  -     POCT ECG    Acute non-recurrent maxillary sinusitis  -     azithromycin (ZITHROMAX) 250 mg tablet; Take 2 tablets today then 1 tablet daily x 4 days    Anxiety  -     ALPRAZolam (XANAX) 0 25 mg tablet; Take 1 tablet (0 25 mg total) by mouth 2 (two) times a day as needed for anxiety            Subjective:        Patient ID: Buck Tang is a 44 y o  female  Patient is here for preop clearance requested by Dr Carson Bello for right knee meniscal surgery to be done on August 11th  Patient is having some chest pain intermittently  Patient was seen in the emergency room in December of 2020 normal EKG and chest x-ray  Occasional shortness of breath noted  Patient has noticed increased anxiety  Patient with some sinus issues at the present time  No fever  No bleeding issues noted  No nausea vomiting or constipation diarrhea or change in urination  Patient with ongoing right knee discomfort        The following portions of the patient's history were reviewed and updated as appropriate: allergies, current medications, past family history, past medical history, past social history, past surgical history and problem list       Review of Systems   Constitutional: Negative  HENT: Negative  Eyes: Negative  Respiratory: Negative  Cardiovascular: Positive for chest pain  Gastrointestinal: Negative  Endocrine: Negative  Genitourinary: Negative  Musculoskeletal: Positive for arthralgias and back pain  Skin: Negative  Allergic/Immunologic: Negative  Neurological: Negative  Hematological: Negative  Psychiatric/Behavioral: Negative  Objective:      BMI Counseling: Body mass index is 38 19 kg/m²  The BMI is above normal  Nutrition recommendations include decreasing portion sizes  Exercise recommendations include moderate physical activity 150 minutes/week  Depression Screening and Follow-up Plan: Clincally patient does not have depression  No treatment is required  /80 (BP Location: Right arm, Patient Position: Sitting, Cuff Size: Standard)   Pulse 90   Temp (!) 96 2 °F (35 7 °C) (Tympanic)   Ht 5' 3" (1 6 m)   Wt 97 8 kg (215 lb 9 6 oz)   LMP 01/12/2018 (Exact Date)   SpO2 99%   BMI 38 19 kg/m²          Physical Exam  Vitals and nursing note reviewed  Constitutional:       General: She is not in acute distress  Appearance: Normal appearance  She is not ill-appearing, toxic-appearing or diaphoretic  HENT:      Head: Normocephalic and atraumatic  Right Ear: Tympanic membrane, ear canal and external ear normal  There is no impacted cerumen  Left Ear: Tympanic membrane, ear canal and external ear normal  There is no impacted cerumen  Nose: Nose normal  No congestion or rhinorrhea  Mouth/Throat:      Mouth: Mucous membranes are moist       Pharynx: No oropharyngeal exudate or posterior oropharyngeal erythema  Eyes:      General: No scleral icterus  Right eye: No discharge  Left eye: No discharge  Extraocular Movements: Extraocular movements intact  Conjunctiva/sclera: Conjunctivae normal       Pupils: Pupils are equal, round, and reactive to light  Neck:      Vascular: No carotid bruit  Cardiovascular:      Rate and Rhythm: Normal rate and regular rhythm        Pulses: Normal pulses  Heart sounds: Normal heart sounds  No murmur heard  No friction rub  No gallop  Pulmonary:      Effort: Pulmonary effort is normal  No respiratory distress  Breath sounds: Normal breath sounds  No stridor  No wheezing, rhonchi or rales  Chest:      Chest wall: No tenderness  Abdominal:      General: Abdomen is flat  Bowel sounds are normal  There is no distension  Palpations: Abdomen is soft  Tenderness: There is no abdominal tenderness  There is no guarding or rebound  Musculoskeletal:         General: Tenderness present  No swelling, deformity or signs of injury  Cervical back: Normal range of motion and neck supple  No rigidity  No muscular tenderness  Right lower leg: No edema  Left lower leg: No edema  Lymphadenopathy:      Cervical: No cervical adenopathy  Skin:     General: Skin is warm and dry  Capillary Refill: Capillary refill takes less than 2 seconds  Coloration: Skin is not jaundiced  Findings: No bruising, erythema, lesion or rash  Neurological:      Mental Status: She is alert and oriented to person, place, and time  Mental status is at baseline  Cranial Nerves: No cranial nerve deficit  Sensory: No sensory deficit  Motor: No weakness  Coordination: Coordination normal       Gait: Gait normal    Psychiatric:         Behavior: Behavior normal          Thought Content:  Thought content normal          Judgment: Judgment normal       Comments: Anxious

## 2021-08-31 ENCOUNTER — TELEPHONE (OUTPATIENT)
Dept: FAMILY MEDICINE CLINIC | Facility: CLINIC | Age: 51
End: 2021-08-31

## 2021-08-31 DIAGNOSIS — H81.13 BENIGN PAROXYSMAL POSITIONAL VERTIGO DUE TO BILATERAL VESTIBULAR DISORDER: Primary | ICD-10-CM

## 2021-09-09 ENCOUNTER — TELEMEDICINE (OUTPATIENT)
Dept: FAMILY MEDICINE CLINIC | Facility: CLINIC | Age: 51
End: 2021-09-09
Payer: OTHER GOVERNMENT

## 2021-09-09 DIAGNOSIS — J01.90 ACUTE SINUSITIS, RECURRENCE NOT SPECIFIED, UNSPECIFIED LOCATION: Primary | ICD-10-CM

## 2021-09-09 PROBLEM — M54.50 LOW BACK PAIN: Status: ACTIVE | Noted: 2020-12-31

## 2021-09-09 PROBLEM — G56.00 CARPAL TUNNEL SYNDROME: Status: ACTIVE | Noted: 2020-12-31

## 2021-09-09 PROBLEM — R07.89 NON-CARDIAC CHEST PAIN: Status: ACTIVE | Noted: 2017-04-26

## 2021-09-09 PROCEDURE — 99441 PR PHYS/QHP TELEPHONE EVALUATION 5-10 MIN: CPT | Performed by: FAMILY MEDICINE

## 2021-09-09 RX ORDER — OXYCODONE HYDROCHLORIDE 5 MG/1
TABLET ORAL
COMMUNITY
Start: 2021-08-11 | End: 2022-03-31 | Stop reason: ALTCHOICE

## 2021-09-09 RX ORDER — TRAMADOL HYDROCHLORIDE 50 MG/1
50 TABLET ORAL EVERY 6 HOURS PRN
COMMUNITY
Start: 2021-08-11 | End: 2022-03-31 | Stop reason: ALTCHOICE

## 2021-09-09 RX ORDER — AZITHROMYCIN 250 MG/1
TABLET, FILM COATED ORAL
COMMUNITY
Start: 2021-07-13 | End: 2022-03-31 | Stop reason: ALTCHOICE

## 2021-09-09 RX ORDER — HYDROCORTISONE 25 MG/G
CREAM TOPICAL
COMMUNITY
Start: 2021-07-28 | End: 2022-03-31 | Stop reason: ALTCHOICE

## 2021-09-09 RX ORDER — HYDROCORTISONE ACETATE 25 MG
SUPPOSITORY, RECTAL RECTAL
COMMUNITY
Start: 2021-07-27 | End: 2022-03-31 | Stop reason: ALTCHOICE

## 2021-09-09 RX ORDER — TRAMADOL HYDROCHLORIDE 50 MG/1
TABLET ORAL
COMMUNITY
Start: 2021-08-11 | End: 2022-03-31 | Stop reason: ALTCHOICE

## 2021-09-09 RX ORDER — HYDROCORTISONE ACETATE 25 MG/1
SUPPOSITORY RECTAL
COMMUNITY
Start: 2021-07-27 | End: 2022-03-31 | Stop reason: ALTCHOICE

## 2021-09-09 RX ORDER — AZITHROMYCIN 250 MG/1
TABLET, FILM COATED ORAL
Qty: 6 TABLET | Refills: 0 | Status: SHIPPED | OUTPATIENT
Start: 2021-09-09 | End: 2021-09-14

## 2021-09-09 RX ORDER — HYDROCORTISONE 25 MG/G
CREAM TOPICAL
COMMUNITY
Start: 2021-07-27 | End: 2022-03-31 | Stop reason: ALTCHOICE

## 2021-09-10 NOTE — PROGRESS NOTES
Virtual Brief Visit    Verification of patient location:    Patient is located in the following state in which I hold an active license [place]      Assessment/Plan:    Problem List Items Addressed This Visit        Respiratory    Acute sinusitis - Primary    Relevant Medications    azithromycin (Zithromax) 250 mg tablet        64-year-old woman with:  Acute sinusitis patient declines COVID testing will give Z-Jeovany discussed supportive care return parameters otherwise advised her to call back if not improving worsening        Reason for visit is   Chief Complaint   Patient presents with    Cough     patient states that she is congested,stuffy watery eyes, coughing  that been going on for about 4 days     Virtual Brief Visit        Encounter provider Baldev Galeano MD    Provider located at [address]  [place] [phone]    Recent Visits  Date Type Provider Dept   09/09/21 Aparna Higgins MD [facility]   Showing recent visits within past 7 days and meeting all other requirements  Future Appointments  No visits were found meeting these conditions  Showing future appointments within next 150 days and meeting all other requirements       After connecting through telephone, the patient was identified by name and date of birth  Leo Jung was informed that this is a telemedicine visit and that the visit is being conducted through telephone  My office door was closed  No one else was in the room  She acknowledged consent and understanding of privacy and security of the platform  The patient has agreed to participate and understands she can discontinue the visit at any time  Patient is aware this is a billable service       It was my intent to perform this visit via video technology but the patient was not able to do a video connection so the visit was completed via audio telephone only  Subjective    Adriana Hitchcock is a 46 y o  female  Patient is a 47-year-old woman presents for follow-up virtual visit plain if worsening issues with cough congestion and sinus pressure she sees is identical to prior signs of infections no fevers chills nausea vomiting tolerating p o  intake       Past Medical History:   Diagnosis Date    Allergic rhinitis     GERD (gastroesophageal reflux disease)     Hyperlipidemia     Migraine     Nasal congestion     Sleep apnea     on CPAP treatment    Sleep difficulties        Past Surgical History:   Procedure Laterality Date     SECTION  1991    CHOLECYSTECTOMY  2013    HEMORRHOID SURGERY  1998    TUBAL LIGATION  2007    WISDOM TOOTH EXTRACTION         Current Outpatient Medications   Medication Sig Dispense Refill    azithromycin (ZITHROMAX) 250 mg tablet       hydrocortisone (Anucort-HC) 25 mg suppository       hydrocortisone (Procto-Med HC) 2 5 % rectal cream       oxyCODONE (ROXICODONE) 5 mg immediate release tablet       traMADol (ULTRAM) 50 mg tablet       ALPRAZolam (XANAX) 0 25 mg tablet Take 1 tablet (0 25 mg total) by mouth 2 (two) times a day as needed for anxiety 20 tablet 0    amitriptyline (ELAVIL) 25 mg tablet       ammonium lactate (LAC-HYDRIN) 12 % cream APPLY TOPICALLY AS NEEDED FOR DRY SKIN 280 g 2    Anucort-HC 25 MG suppository INSERT 1 SUPPOSITORY NIGHTLY      Ascorbic Acid (vitamin C) 1000 MG tablet Take 1,000 mg by mouth daily      atorvastatin (LIPITOR) 10 mg tablet TAKE 1 TABLET AT BEDTIME 90 tablet 3    azithromycin (Zithromax) 250 mg tablet Take 2 tablets (500 mg total) by mouth daily for 1 day, THEN 1 tablet (250 mg total) daily for 4 days   6 tablet 0    B Complex Vitamins (VITAMIN B COMPLEX) TABS Take by mouth      cetirizine (ZyrTEC) 10 mg tablet Take 10 mg by mouth daily      Cholecalciferol (VITAMIN D) 2000 units CAPS Take by mouth      Cholecalciferol 100 MCG (4000 UT) CAPS Take by mouth      EPINEPHrine (EPIPEN) 0 3 mg/0 3 mL SOAJ Inject 0 3 mL (0 3 mg total) into a muscle once for 1 dose 0 6 mL 0    fluticasone (FLONASE) 50 mcg/act nasal spray 1 spray into each nostril 2 (two) times a day 1 Bottle 0    omeprazole (PriLOSEC) 40 MG capsule       ondansetron (ZOFRAN-ODT) 4 mg disintegrating tablet Take 1 tablet every 8 hours as needed for nauea      Procto-Med HC 2 5 % rectal cream APPLY 1 APPLICATION TO THE AFFECTED AREA AS NEEDED      traMADol (ULTRAM) 50 mg tablet Take 50 mg by mouth every 6 (six) hours as needed       No current facility-administered medications for this visit  Allergies   Allergen Reactions    Acetazolamide Hives    Other Hives    Amoxicillin-Pot Clavulanate     Iodinated Diagnostic Agents      Needs to be prepped for IVC    Iothalamate Hives     Contrast dye    Sulfa Antibiotics        Review of Systems   Constitutional: Negative  HENT: Positive for congestion, sinus pressure and sinus pain  Eyes: Negative  Respiratory: Positive for cough  Cardiovascular: Negative  Gastrointestinal: Negative  Endocrine: Negative  Genitourinary: Negative  Musculoskeletal: Negative  Allergic/Immunologic: Negative  Neurological: Negative  Hematological: Negative  Psychiatric/Behavioral: Negative  All other systems reviewed and are negative  There were no vitals filed for this visit  I spent 10 minutes directly with the patient during this visit    VIRTUAL VISIT DISCLAIMER      Emeterio Gaucher verbally agrees to participate in [employer]  Pt is aware that [employer] could be limited without vital signs or the ability to perform a full hands-on physical Yamila Hook understands she or the provider may request at any time to terminate the video visit and request the patient to seek care or treatment in person

## 2021-09-13 ENCOUNTER — TELEPHONE (OUTPATIENT)
Dept: FAMILY MEDICINE CLINIC | Facility: CLINIC | Age: 51
End: 2021-09-13

## 2021-09-21 ENCOUNTER — APPOINTMENT (EMERGENCY)
Dept: CT IMAGING | Facility: HOSPITAL | Age: 51
End: 2021-09-21
Payer: OTHER GOVERNMENT

## 2021-09-21 ENCOUNTER — HOSPITAL ENCOUNTER (EMERGENCY)
Facility: HOSPITAL | Age: 51
Discharge: HOME | End: 2021-09-22
Attending: EMERGENCY MEDICINE | Admitting: EMERGENCY MEDICINE
Payer: OTHER GOVERNMENT

## 2021-09-21 ENCOUNTER — OFFICE VISIT (OUTPATIENT)
Dept: URGENT CARE | Age: 51
End: 2021-09-21
Payer: OTHER GOVERNMENT

## 2021-09-21 VITALS
OXYGEN SATURATION: 100 % | WEIGHT: 215 LBS | HEART RATE: 90 BPM | BODY MASS INDEX: 38.09 KG/M2 | TEMPERATURE: 99 F | HEIGHT: 63 IN

## 2021-09-21 VITALS
RESPIRATION RATE: 18 BRPM | BODY MASS INDEX: 39.21 KG/M2 | WEIGHT: 221.34 LBS | HEART RATE: 89 BPM | DIASTOLIC BLOOD PRESSURE: 72 MMHG | SYSTOLIC BLOOD PRESSURE: 150 MMHG | OXYGEN SATURATION: 99 % | TEMPERATURE: 98.1 F

## 2021-09-21 DIAGNOSIS — J02.9 PHARYNGITIS: ICD-10-CM

## 2021-09-21 DIAGNOSIS — J02.9 SORE THROAT: Primary | ICD-10-CM

## 2021-09-21 DIAGNOSIS — R59.1 LYMPHADENOPATHY: Primary | ICD-10-CM

## 2021-09-21 LAB
ANION GAP SERPL CALCULATED.3IONS-SCNC: 10 MMOL/L (ref 4–13)
BUN SERPL-MCNC: 15 MG/DL (ref 5–25)
CALCIUM SERPL-MCNC: 8.8 MG/DL (ref 8.3–10.1)
CHLORIDE SERPL-SCNC: 105 MMOL/L (ref 100–108)
CO2 SERPL-SCNC: 26 MMOL/L (ref 21–32)
CREAT SERPL-MCNC: 0.91 MG/DL (ref 0.6–1.3)
GFR SERPL CREATININE-BSD FRML MDRD: 72 ML/MIN/1.73SQ M
GLUCOSE SERPL-MCNC: 149 MG/DL (ref 65–140)
POTASSIUM SERPL-SCNC: 3.9 MMOL/L (ref 3.5–5.3)
SODIUM SERPL-SCNC: 141 MMOL/L (ref 136–145)

## 2021-09-21 PROCEDURE — 99213 OFFICE O/P EST LOW 20 MIN: CPT | Performed by: PHYSICIAN ASSISTANT

## 2021-09-21 PROCEDURE — 99284 EMERGENCY DEPT VISIT MOD MDM: CPT

## 2021-09-21 PROCEDURE — 85025 COMPLETE CBC W/AUTO DIFF WBC: CPT

## 2021-09-21 PROCEDURE — 99285 EMERGENCY DEPT VISIT HI MDM: CPT | Performed by: EMERGENCY MEDICINE

## 2021-09-21 PROCEDURE — 80048 BASIC METABOLIC PNL TOTAL CA: CPT

## 2021-09-21 PROCEDURE — 36415 COLL VENOUS BLD VENIPUNCTURE: CPT

## 2021-09-21 NOTE — PROGRESS NOTES
[facility]        NAME: Kodak Sawyer is a 46 y o  female  : 1970    MRN: [id number]  DATE: 2021  TIME: 9:52 PM    Assessment and Plan   Sore throat [J02 9]  1  Sore throat  Transfer to other facility    patient presents with complaint for sore throat that has not improved with steroid antibiotic treatment  She reports that her lymph nodes have been gradually increasing in size and she feels she is having difficulty swallowing at this time she denies any voice changes  Symptoms and exam concerning for development of possible tonsillar, peritonsillar retropharyngeal abscess patient needs additional evaluation was directed to the ER for further workup  Patient would like to go to [facility] ER transfer orders were placed  Patient Instructions     Report directly to the emergency room for further evaluation    Chief Complaint     Chief Complaint   Patient presents with    COVID-19     pt was on vacation for about a week and came back and got sick pt states she was tested for covid about one week and was neg  pt is c/o sore throat with a slight cough  pt was recently on z pack and steroids  History of Present Illness        Patient presents with complaints of sore throat for 2 weeks duration  She states that she vacation and came back and was ill  She had COVID testing which was negative  Patient was placed on a steroid and Z-Jeovany to treat sore throat however symptoms have not improved  Patient reports that she feels that her anterior lymph nodes are significantly swollen and he has happened to clear her throat a lot and it feels like her throat is getting tight as if it is about to close and she is having difficulty swallowing  Patient denies any fevers or chills, she has no other complaints at this time  Review of Systems   Review of Systems   Constitutional: Negative for chills and fever  HENT: Positive for sore throat and trouble swallowing  Musculoskeletal: Positive for neck pain           Current Medications       Current Outpatient Medications:     ALPRAZolam (XANAX) 0 25 mg tablet, Take 1 tablet (0 25 mg total) by mouth 2 (two) times a day as needed for anxiety, Disp: 20 tablet, Rfl: 0    amitriptyline (ELAVIL) 25 mg tablet, , Disp: , Rfl:     ammonium lactate (LAC-HYDRIN) 12 % cream, APPLY TOPICALLY AS NEEDED FOR DRY SKIN, Disp: 280 g, Rfl: 2    Anucort-HC 25 MG suppository, INSERT 1 SUPPOSITORY NIGHTLY, Disp: , Rfl:     Ascorbic Acid (vitamin C) 1000 MG tablet, Take 1,000 mg by mouth daily, Disp: , Rfl:     atorvastatin (LIPITOR) 10 mg tablet, TAKE 1 TABLET AT BEDTIME, Disp: 90 tablet, Rfl: 3    azithromycin (ZITHROMAX) 250 mg tablet, , Disp: , Rfl:     B Complex Vitamins (VITAMIN B COMPLEX) TABS, Take by mouth, Disp: , Rfl:     cetirizine (ZyrTEC) 10 mg tablet, Take 10 mg by mouth daily, Disp: , Rfl:     Cholecalciferol (VITAMIN D) 2000 units CAPS, Take by mouth, Disp: , Rfl:     Cholecalciferol 100 MCG (4000 UT) CAPS, Take by mouth, Disp: , Rfl:     EPINEPHrine (EPIPEN) 0 3 mg/0 3 mL SOAJ, Inject 0 3 mL (0 3 mg total) into a muscle once for 1 dose, Disp: 0 6 mL, Rfl: 0    flunisolide (NASALIDE) 25 MCG/ACT (0 025%) SOLN, 2 sprays into each nostril 2 (two) times a day, Disp: , Rfl:     fluticasone (FLONASE) 50 mcg/act nasal spray, 1 spray into each nostril 2 (two) times a day, Disp: 1 Bottle, Rfl: 0    hydrocortisone (Anucort-HC) 25 mg suppository, , Disp: , Rfl:     hydrocortisone (Procto-Med HC) 2 5 % rectal cream, , Disp: , Rfl:     omeprazole (PriLOSEC) 40 MG capsule, , Disp: , Rfl:     ondansetron (ZOFRAN-ODT) 4 mg disintegrating tablet, Take 1 tablet every 8 hours as needed for nauea, Disp: , Rfl:     oxyCODONE (ROXICODONE) 5 mg immediate release tablet, , Disp: , Rfl:     Procto-Med HC 2 5 % rectal cream, APPLY 1 APPLICATION TO THE AFFECTED AREA AS NEEDED, Disp: , Rfl:     traMADol (ULTRAM) 50 mg tablet, , Disp: , Rfl:   traMADol (ULTRAM) 50 mg tablet, Take 50 mg by mouth every 6 (six) hours as needed, Disp: , Rfl:     Current Allergies     Allergies as of 2021 - Reviewed 2021   Allergen Reaction Noted    Acetazolamide Hives 10/20/2020    Other Hives 10/20/2020    Amoxicillin-pot clavulanate  2013    Iodinated diagnostic agents  2019    Iothalamate Hives 2012    Sulfa antibiotics  2013            The following portions of the patient's history were reviewed and updated as appropriate: allergies, current medications, past family history, past medical history, past social history, past surgical history and problem list      Past Medical History:   Diagnosis Date    Allergic     Allergic rhinitis     Anxiety     Depression     GERD (gastroesophageal reflux disease)     Hyperlipidemia     Migraine     Nasal congestion     Sleep apnea     on CPAP treatment    Sleep difficulties        Past Surgical History:   Procedure Laterality Date     SECTION  1991    CHOLECYSTECTOMY  2013    HEMORRHOID SURGERY  1998    MENISCECTOMY      TUBAL LIGATION      WISDOM TOOTH EXTRACTION         Family History   Problem Relation Age of Onset    Alzheimer's disease Father     Diabetes Father     Leukemia Mother          Medications have been verified  Objective   Pulse 90   Temp 99 °F (37 2 °C)   Ht 5' 3" (1 6 m)   Wt 97 5 kg (215 lb)   LMP 2018 (Exact Date)   SpO2 100%   BMI 38 09 kg/m²   Patient's last menstrual period was 2018 (exact date)  Physical Exam     Physical Exam  Vitals and nursing note reviewed  Constitutional:       General: She is awake  She is not in acute distress  Appearance: Normal appearance  She is well-developed and well-groomed  She is not ill-appearing, toxic-appearing or diaphoretic  HENT:      Head: Normocephalic and atraumatic        Right Ear: Hearing and external ear normal       Left Ear: Hearing and external ear normal       Mouth/Throat:      Lips: Pink  No lesions  Mouth: Mucous membranes are moist       Tongue: No lesions  Tongue does not deviate from midline  Palate: No mass and lesions  Pharynx: Pharyngeal swelling, posterior oropharyngeal erythema and uvula swelling ( with petechiae) present  No oropharyngeal exudate  Tonsils: No tonsillar exudate or tonsillar abscesses  Eyes:      General: Lids are normal  Vision grossly intact  Gaze aligned appropriately  Neck:      Trachea: Trachea and phonation normal  No tracheal tenderness or tracheal deviation  Comments:  Patient with significant anterior lymph node swelling  Cardiovascular:      Rate and Rhythm: Normal rate  Pulmonary:      Effort: Pulmonary effort is normal       Comments: Patient is speaking in full sentences with no increased respiratory effort  No audible wheezing or stridor  Musculoskeletal:      Cervical back: Normal range of motion  Lymphadenopathy:      Cervical: Cervical adenopathy present  Right cervical: Superficial cervical adenopathy present  Left cervical: Superficial cervical adenopathy present  Skin:     General: Skin is warm and dry  Neurological:      Mental Status: She is alert and oriented to person, place, and time  Coordination: Coordination is intact  Gait: Gait is intact  Psychiatric:         Attention and Perception: Attention and perception normal          Mood and Affect: Mood and affect normal          Speech: Speech normal          Behavior: Behavior normal  Behavior is cooperative  Note: Portions of this record may have been created with voice recognition software  Occasional wrong word or "sound a like" substitutions may have occurred due to the inherent limitations of voice recognition software  Please read the chart carefully and recognize, using context, where substitutions have occurred  *

## 2021-09-22 ENCOUNTER — APPOINTMENT (EMERGENCY)
Dept: CT IMAGING | Facility: HOSPITAL | Age: 51
End: 2021-09-22
Payer: OTHER GOVERNMENT

## 2021-09-22 LAB
BASOPHILS # BLD AUTO: 0.03 THOUSANDS/ΜL (ref 0–0.1)
BASOPHILS NFR BLD AUTO: 0 % (ref 0–1)
EOSINOPHIL # BLD AUTO: 0.23 THOUSAND/ΜL (ref 0–0.61)
EOSINOPHIL NFR BLD AUTO: 2 % (ref 0–6)
ERYTHROCYTE [DISTWIDTH] IN BLOOD BY AUTOMATED COUNT: 13.8 % (ref 11.6–15.1)
HCT VFR BLD AUTO: 44.8 % (ref 34.8–46.1)
HGB BLD-MCNC: 13.7 G/DL (ref 11.5–15.4)
IMM GRANULOCYTES # BLD AUTO: 0.08 THOUSAND/UL (ref 0–0.2)
IMM GRANULOCYTES NFR BLD AUTO: 1 % (ref 0–2)
LYMPHOCYTES # BLD AUTO: 3.42 THOUSANDS/ΜL (ref 0.6–4.47)
LYMPHOCYTES NFR BLD AUTO: 30 % (ref 14–44)
MCH RBC QN AUTO: 28.1 PG (ref 26.8–34.3)
MCHC RBC AUTO-ENTMCNC: 30.6 G/DL (ref 31.4–37.4)
MCV RBC AUTO: 92 FL (ref 82–98)
MONOCYTES # BLD AUTO: 0.64 THOUSAND/ΜL (ref 0.17–1.22)
MONOCYTES NFR BLD AUTO: 6 % (ref 4–12)
NEUTROPHILS # BLD AUTO: 7.05 THOUSANDS/ΜL (ref 1.85–7.62)
NEUTS SEG NFR BLD AUTO: 61 % (ref 43–75)
NRBC BLD AUTO-RTO: 0 /100 WBCS
PLATELET # BLD AUTO: 305 THOUSANDS/UL (ref 149–390)
PMV BLD AUTO: 10.7 FL (ref 8.9–12.7)
RBC # BLD AUTO: 4.87 MILLION/UL (ref 3.81–5.12)
WBC # BLD AUTO: 11.45 THOUSAND/UL (ref 4.31–10.16)

## 2021-09-22 PROCEDURE — 70490 CT SOFT TISSUE NECK W/O DYE: CPT

## 2021-09-22 PROCEDURE — G1004 CDSM NDSC: HCPCS

## 2021-09-22 PROCEDURE — 96372 THER/PROPH/DIAG INJ SC/IM: CPT

## 2021-09-22 RX ORDER — LORAZEPAM 2 MG/ML
1 INJECTION INTRAMUSCULAR ONCE
Status: COMPLETED | OUTPATIENT
Start: 2021-09-22 | End: 2021-09-22

## 2021-09-22 RX ORDER — LORAZEPAM 2 MG/ML
1 INJECTION INTRAMUSCULAR ONCE
Status: DISCONTINUED | OUTPATIENT
Start: 2021-09-22 | End: 2021-09-22

## 2021-09-22 RX ADMIN — LORAZEPAM 1 MG: 2 INJECTION INTRAMUSCULAR; INTRAVENOUS at 00:38

## 2021-09-22 NOTE — ED ATTENDING ATTESTATION
9/21/2021  Molly KHALIL, DO, saw and evaluated the patient  I have discussed the patient with the resident/non-physician practitioner and agree with the resident's/non-physician practitioner's findings, Plan of Care, and MDM as documented in the resident's/non-physician practitioner's note, except where noted  All available labs and Radiology studies were reviewed  I was present for key portions of any procedure(s) performed by the resident/non-physician practitioner and I was immediately available to provide assistance  At this point I agree with the current assessment done in the Emergency Department  I have conducted an independent evaluation of this patient a history and physical is as follows:    ED Course         Critical Care Time  Procedures  49-year-old female presents to the emergency department with bilateral neck swelling and pain radiating to the both sides of her face and around the back of her neck over the last 2 days  Patient previously had been treated for sinusitis with Zithromax  She states over the weekend she noticed increased throat pain and felt like she could not swallow  She went to an ED and they felt it was secondary to postnasal drip from her sinus infection  She was given steroids but she states nothing is helping and she has noticed increased swelling over the last few days  She is able to swallow she has had no drooling  No fevers or chills  On exam she is alert no acute distress  She has a normal voice  Her pharynx is normal   Her dentition is normal   She does have some swelling bilaterally to the buccal mucosa  She has diffuse edema of her neck but no erythema, induration or abscess noted    Differential includes but is not limited to parotitis, lymph adenitis, less likely Dennis's angina given her benign exam   Will CT soft tissue neck

## 2021-09-22 NOTE — ED PROVIDER NOTES
History  Chief Complaint   Patient presents with    Swollen Glands     patient c/o throat swelling and was sent over from urgent care for r/o abcess  denies fevers  airway patent  47 yo F presents from urgent care for neck swelling and dysphagia  Pt has had URI symptoms x 2 weeks  Treated with abx for sinusitis  3 days ago patient noticed swelling in the neck bilaterally with decreased ROM and a feeling of fullness in the mouth  Symptoms have slowly worsened since onset  Pt went to urgent care and was sent to the ER for concerns of abscess  Denies difficulty breathing, dysphonia, drooling, fevers, recent dental work, or headache  Prior to Admission Medications   Prescriptions Last Dose Informant Patient Reported? Taking?    ALPRAZolam (XANAX) 0 25 mg tablet   No Yes   Sig: Take 1 tablet (0 25 mg total) by mouth 2 (two) times a day as needed for anxiety   Anucort-HC 25 MG suppository Not Taking at Unknown time  Yes No   Sig: INSERT 1 SUPPOSITORY NIGHTLY   Patient not taking: Reported on 9/21/2021   Ascorbic Acid (vitamin C) 1000 MG tablet  Self Yes Yes   Sig: Take 1,000 mg by mouth daily   B Complex Vitamins (VITAMIN B COMPLEX) TABS  Self Yes Yes   Sig: Take by mouth   Cholecalciferol (VITAMIN D) 2000 units CAPS  Self Yes Yes   Sig: Take by mouth   Cholecalciferol 100 MCG (4000 UT) CAPS  Self Yes Yes   Sig: Take by mouth   EPINEPHrine (EPIPEN) 0 3 mg/0 3 mL SOAJ   No No   Sig: Inject 0 3 mL (0 3 mg total) into a muscle once for 1 dose   Procto-Med HC 2 5 % rectal cream   Yes No   Sig: APPLY 1 APPLICATION TO THE AFFECTED AREA AS NEEDED   amitriptyline (ELAVIL) 25 mg tablet  Self Yes Yes   ammonium lactate (LAC-HYDRIN) 12 % cream  Self No Yes   Sig: APPLY TOPICALLY AS NEEDED FOR DRY SKIN   atorvastatin (LIPITOR) 10 mg tablet  Self No Yes   Sig: TAKE 1 TABLET AT BEDTIME   azithromycin (ZITHROMAX) 250 mg tablet Not Taking at Unknown time  Yes No   Patient not taking: Reported on 9/21/2021   cetirizine (ZyrTEC) 10 mg tablet  Self Yes Yes   Sig: Take 10 mg by mouth daily   flunisolide (NASALIDE) 25 MCG/ACT (0 025%) SOLN   Yes Yes   Si sprays into each nostril 2 (two) times a day   fluticasone (FLONASE) 50 mcg/act nasal spray  Self No Yes   Si spray into each nostril 2 (two) times a day   hydrocortisone (Anucort-HC) 25 mg suppository Not Taking at Unknown time  Yes No   Patient not taking: Reported on 2021   hydrocortisone (Procto-Med HC) 2 5 % rectal cream Not Taking at Unknown time  Yes No   Patient not taking: Reported on 2021   omeprazole (PriLOSEC) 40 MG capsule  Self Yes Yes   ondansetron (ZOFRAN-ODT) 4 mg disintegrating tablet  Self Yes Yes   Sig: Take 1 tablet every 8 hours as needed for nauea   oxyCODONE (ROXICODONE) 5 mg immediate release tablet Not Taking at Unknown time  Yes No   Patient not taking: Reported on 2021   traMADol (ULTRAM) 50 mg tablet Not Taking at Unknown time  Yes No   Patient not taking: Reported on 2021   traMADol (ULTRAM) 50 mg tablet Not Taking at Unknown time  Yes No   Sig: Take 50 mg by mouth every 6 (six) hours as needed   Patient not taking: Reported on 2021      Facility-Administered Medications: None       Past Medical History:   Diagnosis Date    Allergic     Allergic rhinitis     Anxiety     Depression     GERD (gastroesophageal reflux disease)     Hyperlipidemia     Migraine     Nasal congestion     Sleep apnea     on CPAP treatment    Sleep difficulties        Past Surgical History:   Procedure Laterality Date     SECTION  1991    CHOLECYSTECTOMY  2013    HEMORRHOID SURGERY  1998    MENISCECTOMY      TUBAL LIGATION  2007    WISDOM TOOTH EXTRACTION         Family History   Problem Relation Age of Onset    Alzheimer's disease Father     Diabetes Father     Leukemia Mother      I have reviewed and agree with the history as documented      E-Cigarette/Vaping    E-Cigarette Use Never User      E-Cigarette/Vaping Substances    Nicotine No     THC No     CBD No     Flavoring No     Other No     Unknown No      Social History     Tobacco Use    Smoking status: Never Smoker    Smokeless tobacco: Never Used   Vaping Use    Vaping Use: Never used   Substance Use Topics    Alcohol use: No     Comment: SOCIAL DRINKER    Drug use: No        Review of Systems   Constitutional: Negative  HENT: Positive for congestion, facial swelling, sinus pressure, sinus pain, sore throat and trouble swallowing  Eyes: Negative  Respiratory: Negative  Cardiovascular: Negative  Gastrointestinal: Negative  Endocrine: Negative  Genitourinary: Negative  Musculoskeletal: Negative  Skin: Negative  Allergic/Immunologic: Negative  Neurological: Negative  Hematological: Negative  Psychiatric/Behavioral: Negative  Physical Exam  ED Triage Vitals   Temperature Pulse Respirations Blood Pressure SpO2   09/21/21 1924 09/21/21 1924 09/21/21 1924 09/21/21 1924 09/21/21 1924   98 1 °F (36 7 °C) 103 18 (!) 156/101 99 %      Temp Source Heart Rate Source Patient Position - Orthostatic VS BP Location FiO2 (%)   09/21/21 1924 09/21/21 2309 09/21/21 1924 09/21/21 1924 --   Oral Monitor Sitting Right arm       Pain Score       --                    Orthostatic Vital Signs  Vitals:    09/21/21 1924 09/21/21 2309   BP: (!) 156/101 150/72   Pulse: 103 89   Patient Position - Orthostatic VS: Sitting        Physical Exam  Vitals and nursing note reviewed  Constitutional:       General: She is not in acute distress  Appearance: Normal appearance  She is normal weight  She is not ill-appearing, toxic-appearing or diaphoretic  Comments: No respiratory distress  Normal phonation  Controlling secretions  HENT:      Head: Normocephalic and atraumatic  Jaw: There is normal jaw occlusion  Tenderness and swelling present  No trismus  Comments: Tongue and floor of mouth appear elevated   Soft tissue swelling and tenderness of submental space and bilateral buccal mucosa  Right Ear: Tympanic membrane, ear canal and external ear normal       Left Ear: Tympanic membrane, ear canal and external ear normal       Nose: Congestion present  Mouth/Throat:      Mouth: Mucous membranes are moist       Dentition: Normal dentition  Pharynx: Uvula midline  Posterior oropharyngeal erythema present  No pharyngeal swelling, oropharyngeal exudate or uvula swelling  Tonsils: No tonsillar abscesses  Comments: Uvula midline  Tonsils symmetric  Limited visualization due to tongue elevation  Eyes:      General:         Right eye: No discharge  Left eye: No discharge  Extraocular Movements: Extraocular movements intact  Conjunctiva/sclera: Conjunctivae normal       Pupils: Pupils are equal, round, and reactive to light  Neck:      Thyroid: No thyroid mass, thyromegaly or thyroid tenderness  Vascular: No carotid bruit or JVD  Trachea: Phonation normal  No abnormal tracheal secretions  Cardiovascular:      Rate and Rhythm: Normal rate and regular rhythm  Pulses: Normal pulses  Heart sounds: Normal heart sounds  No murmur heard  No friction rub  Pulmonary:      Effort: Pulmonary effort is normal  No respiratory distress  Breath sounds: Normal breath sounds  No wheezing or rales  Abdominal:      General: Abdomen is flat  Bowel sounds are normal  There is no distension  Palpations: Abdomen is soft  Tenderness: There is no abdominal tenderness  There is no guarding  Musculoskeletal:         General: Normal range of motion  Cervical back: Normal range of motion and neck supple  Edema present  No erythema  Pain with movement and muscular tenderness present  No spinous process tenderness  Lymphadenopathy:      Cervical: Cervical adenopathy present  Skin:     General: Skin is warm and dry  Capillary Refill: Capillary refill takes less than 2 seconds  Coloration: Skin is not pale  Neurological:      General: No focal deficit present  Mental Status: She is alert and oriented to person, place, and time     Psychiatric:         Mood and Affect: Mood normal          Behavior: Behavior normal          ED Medications  Medications   LORazepam (ATIVAN) injection 1 mg (1 mg Intramuscular Given 9/22/21 0038)       Diagnostic Studies  Results Reviewed     Procedure Component Value Units Date/Time    CBC and differential [[id number]]  (Abnormal) Collected: 09/21/21 2300    Lab Status: Final result Specimen: Blood from Hand, Right Updated: 09/22/21 0155     WBC 11 45 Thousand/uL      RBC 4 87 Million/uL      Hemoglobin 13 7 g/dL      Hematocrit 44 8 %      MCV 92 fL      MCH 28 1 pg      MCHC 30 6 g/dL      RDW 13 8 %      MPV 10 7 fL      Platelets 113 Thousands/uL      nRBC 0 /100 WBCs      Neutrophils Relative 61 %      Immat GRANS % 1 %      Lymphocytes Relative 30 %      Monocytes Relative 6 %      Eosinophils Relative 2 %      Basophils Relative 0 %      Neutrophils Absolute 7 05 Thousands/µL      Immature Grans Absolute 0 08 Thousand/uL      Lymphocytes Absolute 3 42 Thousands/µL      Monocytes Absolute 0 64 Thousand/µL      Eosinophils Absolute 0 23 Thousand/µL      Basophils Absolute 0 03 Thousands/µL     Basic metabolic panel [[id number]]  (Abnormal) Collected: 09/21/21 2300    Lab Status: Final result Specimen: Blood from Hand, Right Updated: 09/21/21 2337     Sodium 141 mmol/L      Potassium 3 9 mmol/L      Chloride 105 mmol/L      CO2 26 mmol/L      ANION GAP 10 mmol/L      BUN 15 mg/dL      Creatinine 0 91 mg/dL      Glucose 149 mg/dL      Calcium 8 8 mg/dL      eGFR 72 ml/min/1 73sq m     Narrative:      [place] guidelines for Chronic Kidney Disease (CKD):     Stage 1 with normal or high GFR (GFR > 90 mL/min/1 73 square meters)    Stage 2 Mild CKD (GFR = 60-89 mL/min/1 73 square meters)    Stage 3A Moderate CKD (GFR = 45-59 mL/min/1 73 square meters)    Stage 3B Moderate CKD (GFR = 30-44 mL/min/1 73 square meters)    Stage 4 Severe CKD (GFR = 15-29 mL/min/1 73 square meters)    Stage 5 End Stage CKD (GFR <15 mL/min/1 73 square meters)  Note: GFR calculation is accurate only with a steady state creatinine                 CT soft tissue neck wo contrast   Final Result by Kavya Galeana MD (09/22 0120)      No evidence of mass, abscess or pathologic adenopathy               Workstation performed: [id number]               Procedures  Procedures      ED Course                             SBIRT 20yo+      Most Recent Value   SBIRT (24 yo +)   In order to provide better care to our patients, we are screening all of our patients for alcohol and drug use  Would it be okay to ask you these screening questions? No Filed at: 09/21/2021 2232                MDM  Number of Diagnoses or Management Options  Lymphadenopathy: new and does not require workup  Diagnosis management comments: Impression: generalized soft tissue swelling vs LAD  No signs of infection  No airway involvement     Plan: CT neck, basic labs       Amount and/or Complexity of Data Reviewed  Clinical lab tests: ordered and reviewed  Tests in the radiology section of CPT®: ordered and reviewed  Review and summarize past medical records: yes  Independent visualization of images, tracings, or specimens: yes    Risk of Complications, Morbidity, and/or Mortality  Presenting problems: moderate  Diagnostic procedures: low  Management options: low    Patient Progress  Patient progress: stable      Disposition  Final diagnoses:   Lymphadenopathy   Pharyngitis     Time reflects when diagnosis was documented in both MDM as applicable and the Disposition within this note     Time User Action Codes Description Comment    9/22/2021  1:37 AM Justin Tamayo Add [R59 1] Lymphadenopathy     9/22/2021  2:01 AM Justin Tamayo Add [J02 9] Pharyngitis       ED Disposition     ED Disposition Condition Date/Time Comment    Discharge Stable Wed Sep 22, 2021  2:01 AM Poncho Ramires discharge to home/self care  Follow-up Information    None         Patient's Medications   Discharge Prescriptions    No medications on file     No discharge procedures on file  PDMP Review       Value Time User    PDMP Reviewed  Yes 7/13/2021  2:49 PM Jose Eduardo Ordoñez DO           ED Provider  Attending physically available and evaluated Poncho Ramires  I managed the patient along with the ED Attending      Electronically Signed by         Santhosh Salazar MD  09/22/21 7593

## 2021-09-22 NOTE — DISCHARGE INSTRUCTIONS
Return to ER if you have difficulty breathing  Follow up with your family doctor if symptoms do not improve

## 2021-09-27 ENCOUNTER — TELEPHONE (OUTPATIENT)
Dept: FAMILY MEDICINE CLINIC | Facility: CLINIC | Age: 51
End: 2021-09-27

## 2022-01-25 ENCOUNTER — OFFICE VISIT (OUTPATIENT)
Dept: FAMILY MEDICINE CLINIC | Facility: CLINIC | Age: 52
End: 2022-01-25
Payer: OTHER GOVERNMENT

## 2022-01-25 VITALS
SYSTOLIC BLOOD PRESSURE: 130 MMHG | TEMPERATURE: 97.4 F | OXYGEN SATURATION: 98 % | HEART RATE: 102 BPM | WEIGHT: 214.4 LBS | BODY MASS INDEX: 37.99 KG/M2 | HEIGHT: 63 IN | DIASTOLIC BLOOD PRESSURE: 80 MMHG

## 2022-01-25 DIAGNOSIS — M25.561 CHRONIC PAIN OF BOTH KNEES: ICD-10-CM

## 2022-01-25 DIAGNOSIS — Z00.00 ROUTINE ADULT HEALTH MAINTENANCE: ICD-10-CM

## 2022-01-25 DIAGNOSIS — Z01.818 PREOP EXAMINATION: ICD-10-CM

## 2022-01-25 DIAGNOSIS — M25.562 CHRONIC PAIN OF BOTH KNEES: ICD-10-CM

## 2022-01-25 DIAGNOSIS — Z12.4 SCREENING FOR CERVICAL CANCER: ICD-10-CM

## 2022-01-25 DIAGNOSIS — Z12.31 SCREENING MAMMOGRAM FOR BREAST CANCER: Primary | ICD-10-CM

## 2022-01-25 DIAGNOSIS — G89.29 CHRONIC PAIN OF BOTH KNEES: ICD-10-CM

## 2022-01-25 PROCEDURE — 99396 PREV VISIT EST AGE 40-64: CPT | Performed by: FAMILY MEDICINE

## 2022-01-25 PROCEDURE — 99213 OFFICE O/P EST LOW 20 MIN: CPT | Performed by: FAMILY MEDICINE

## 2022-01-27 PROBLEM — Z00.00 ROUTINE ADULT HEALTH MAINTENANCE: Status: ACTIVE | Noted: 2022-01-27

## 2022-01-27 NOTE — PROGRESS NOTES
Assessment/Plan:    47 y/o woman with: Annual well visit  Encourage healthy diet exercise ample sleep stress reduction healthy weight as tolerated will order mammo and encourage GYN exam annually    No problem-specific Assessment & Plan notes found for this encounter  Diagnoses and all orders for this visit:    Screening mammogram for breast cancer  -     Mammo screening bilateral w 3d & cad; Future    Screening for cervical cancer  -     Ambulatory Referral to Gynecology; Future    Preop examination    Chronic pain of both knees    Routine adult health maintenance          Subjective:     Chief Complaint   Patient presents with    Physical Exam     annual phydical and MRI with sedation clearance        Patient ID: Saleem Harding is a 46 y o  female  Pt is a 47 y/o woman who presents for an annual well visit she admits being physically active sleeps well eats healthfully no acute health maintenance complaints      The following portions of the patient's history were reviewed and updated as appropriate: allergies, current medications, past family history, past medical history, past social history, past surgical history and problem list     Review of Systems   Constitutional: Negative  HENT: Negative  Eyes: Negative  Respiratory: Negative  Cardiovascular: Negative  Gastrointestinal: Negative  Endocrine: Negative  Genitourinary: Negative  Musculoskeletal: Positive for arthralgias and myalgias  Allergic/Immunologic: Negative  Neurological: Negative  Hematological: Negative  Psychiatric/Behavioral: Negative  All other systems reviewed and are negative          Objective:      /80 (BP Location: Left arm, Patient Position: Sitting, Cuff Size: Standard)   Pulse 102   Temp (!) 97 4 °F (36 3 °C) (Tympanic)   Ht 5' 3" (1 6 m)   Wt 97 3 kg (214 lb 6 4 oz)   LMP 01/12/2018 (Exact Date)   SpO2 98%   BMI 37 98 kg/m²          Physical Exam  Constitutional:       Appearance: She is well-developed  HENT:      Head: Atraumatic  Right Ear: External ear normal       Left Ear: External ear normal    Eyes:      Conjunctiva/sclera: Conjunctivae normal       Pupils: Pupils are equal, round, and reactive to light  Cardiovascular:      Rate and Rhythm: Normal rate and regular rhythm  Heart sounds: Normal heart sounds  Pulmonary:      Effort: Pulmonary effort is normal  No respiratory distress  Breath sounds: Normal breath sounds  Abdominal:      General: There is no distension  Palpations: Abdomen is soft  Tenderness: There is no abdominal tenderness  There is no guarding or rebound  Musculoskeletal:         General: Normal range of motion  Cervical back: Normal range of motion  Skin:     General: Skin is warm and dry  Neurological:      Mental Status: She is alert and oriented to person, place, and time  Cranial Nerves: No cranial nerve deficit  Psychiatric:         Behavior: Behavior normal          Thought Content:  Thought content normal          Judgment: Judgment normal

## 2022-01-27 NOTE — PROGRESS NOTES
Assessment/Plan:    45 y/o woman with: preprocedure clearance for upcoming MRI with conscious sedation  Patient is low risk and may proceed to procedure with acceptable risk  No problem-specific Assessment & Plan notes found for this encounter  Diagnoses and all orders for this visit:    Screening mammogram for breast cancer  -     Mammo screening bilateral w 3d & cad; Future    Screening for cervical cancer  -     Ambulatory Referral to Gynecology; Future    Preop examination    Chronic pain of both knees          Subjective:     Chief Complaint   Patient presents with    Physical Exam     annual phydical and MRI with sedation clearance        Patient ID: Landon Chan is a 46 y o  female  Pt is a 45 y/o woman who presents for pre procedure clearance for conscious sedation for MRI for eval of chronic knee pain no chest pain no SOB, no cardiopulm  Disease no other complaints      The following portions of the patient's history were reviewed and updated as appropriate: allergies, current medications, past family history, past medical history, past social history, past surgical history and problem list     Review of Systems   Constitutional: Negative  HENT: Negative  Eyes: Negative  Respiratory: Negative  Cardiovascular: Negative  Gastrointestinal: Negative  Endocrine: Negative  Genitourinary: Negative  Musculoskeletal: Positive for arthralgias and myalgias  Allergic/Immunologic: Negative  Neurological: Negative  Hematological: Negative  Psychiatric/Behavioral: Negative  All other systems reviewed and are negative  Objective:      /80 (BP Location: Left arm, Patient Position: Sitting, Cuff Size: Standard)   Pulse 102   Temp (!) 97 4 °F (36 3 °C) (Tympanic)   Ht 5' 3" (1 6 m)   Wt 97 3 kg (214 lb 6 4 oz)   LMP 01/12/2018 (Exact Date)   SpO2 98%   BMI 37 98 kg/m²          Physical Exam  Constitutional:       Appearance: She is well-developed     HENT: Head: Atraumatic  Right Ear: External ear normal       Left Ear: External ear normal    Eyes:      Conjunctiva/sclera: Conjunctivae normal       Pupils: Pupils are equal, round, and reactive to light  Cardiovascular:      Rate and Rhythm: Normal rate and regular rhythm  Heart sounds: Normal heart sounds  Pulmonary:      Effort: Pulmonary effort is normal  No respiratory distress  Breath sounds: Normal breath sounds  Abdominal:      General: There is no distension  Palpations: Abdomen is soft  Tenderness: There is no abdominal tenderness  There is no guarding or rebound  Musculoskeletal:         General: Normal range of motion  Cervical back: Normal range of motion  Skin:     General: Skin is warm and dry  Neurological:      Mental Status: She is alert and oriented to person, place, and time  Cranial Nerves: No cranial nerve deficit  Psychiatric:         Behavior: Behavior normal          Thought Content:  Thought content normal          Judgment: Judgment normal

## 2022-01-31 NOTE — TELEPHONE ENCOUNTER
Patient requesting medication refill. Please approve or deny this request.    Rx requested:  Requested Prescriptions     Pending Prescriptions Disp Refills    ALPRAZolam (XANAX) 0.5 MG tablet       Sig: Take 1 tablet by mouth daily.          Last Office Visit:   9/27/2021      Next Visit Date:  Future Appointments   Date Time Provider Lashonda Villagran   5/23/2022  1:00 PM Miracle Sharp MD [facility] See if patient will be willing to do CT scan of the abdomen and pelvis with IV contrast   Patient would need steroids and Benadryl prior to CT scan

## 2022-04-27 ENCOUNTER — OFFICE VISIT (OUTPATIENT)
Dept: FAMILY MEDICINE CLINIC | Facility: CLINIC | Age: 52
End: 2022-04-27
Payer: OTHER GOVERNMENT

## 2022-04-27 VITALS
HEIGHT: 63 IN | BODY MASS INDEX: 38.45 KG/M2 | DIASTOLIC BLOOD PRESSURE: 88 MMHG | OXYGEN SATURATION: 98 % | WEIGHT: 217 LBS | TEMPERATURE: 97.9 F | RESPIRATION RATE: 18 BRPM | SYSTOLIC BLOOD PRESSURE: 130 MMHG | HEART RATE: 103 BPM

## 2022-04-27 DIAGNOSIS — G56.80 SCAPULOTHORACIC SYNDROME: ICD-10-CM

## 2022-04-27 DIAGNOSIS — M75.101 ROTATOR CUFF SYNDROME OF RIGHT SHOULDER: Primary | ICD-10-CM

## 2022-04-27 PROCEDURE — 99213 OFFICE O/P EST LOW 20 MIN: CPT | Performed by: FAMILY MEDICINE

## 2022-04-27 RX ORDER — PERPHENAZINE 4 MG
TABLET ORAL
COMMUNITY

## 2022-04-27 RX ORDER — PREDNISONE 20 MG/1
40 TABLET ORAL DAILY
Qty: 10 TABLET | Refills: 0 | Status: SHIPPED | OUTPATIENT
Start: 2022-04-27 | End: 2022-05-02

## 2022-04-29 NOTE — PROGRESS NOTES
Assessment/Plan:    78-year-old woman with: Rotator cuff syndrome of the right shoulder at with scapulothoracic syndrome will give steroid burst discussed heat and cold stretching and will refer to physical therapy advised to call back if not improving worsening    No problem-specific Assessment & Plan notes found for this encounter  Diagnoses and all orders for this visit:    Rotator cuff syndrome of right shoulder  -     predniSONE 20 mg tablet; Take 2 tablets (40 mg total) by mouth daily for 5 days  -     Ambulatory Referral to Physical Therapy; Future    Scapulothoracic syndrome  -     predniSONE 20 mg tablet; Take 2 tablets (40 mg total) by mouth daily for 5 days  -     Ambulatory Referral to Physical Therapy; Future    Other orders  -     Collagen-Vitamin C-Biotin (Collagen 1500/C) 500-50-0 8 MG CAPS  -     Elderberry 575 MG/5ML SYRP  -     Multiple Vitamin (MULTIVITAMIN ADULT PO)  -     Zinc Sulfate (ZINC 15 PO)  -     Turmeric (QC TUMERIC COMPLEX PO)          Subjective:     Chief Complaint   Patient presents with    Back Pain    Arm Pain    Knee Pain        Patient ID: Myriam Greco is a 46 y o  female  Patient is a 78-year-old woman who presents for follow-up on shoulder pain that is getting worse no fevers chills nausea vomiting no weakness numbness or tingling no complaints at this time    Back Pain    Arm Pain     Knee Pain         The following portions of the patient's history were reviewed and updated as appropriate: allergies, current medications, past family history, past medical history, past social history, past surgical history and problem list     Review of Systems   Constitutional: Negative  HENT: Negative  Eyes: Negative  Respiratory: Negative  Cardiovascular: Negative  Gastrointestinal: Negative  Endocrine: Negative  Genitourinary: Negative  Musculoskeletal: Positive for arthralgias, back pain and myalgias  Allergic/Immunologic: Negative  Neurological: Negative  Hematological: Negative  Psychiatric/Behavioral: Negative  All other systems reviewed and are negative  Objective:      /88 (BP Location: Right arm, Patient Position: Sitting, Cuff Size: Adult)   Pulse 103   Temp 97 9 °F (36 6 °C) (Tympanic)   Resp 18   Ht 5' 3" (1 6 m)   Wt 98 4 kg (217 lb)   LMP 01/12/2018 (Exact Date)   SpO2 98%   BMI 38 44 kg/m²          Physical Exam  Constitutional:       Appearance: She is well-developed  HENT:      Head: Atraumatic  Right Ear: External ear normal       Left Ear: External ear normal    Eyes:      Conjunctiva/sclera: Conjunctivae normal       Pupils: Pupils are equal, round, and reactive to light  Cardiovascular:      Rate and Rhythm: Normal rate and regular rhythm  Heart sounds: Normal heart sounds  Pulmonary:      Effort: Pulmonary effort is normal  No respiratory distress  Breath sounds: Normal breath sounds  Abdominal:      General: There is no distension  Palpations: Abdomen is soft  Tenderness: There is no abdominal tenderness  There is no guarding or rebound  Musculoskeletal:         General: Normal range of motion  Cervical back: Normal range of motion  Skin:     General: Skin is warm and dry  Neurological:      Mental Status: She is alert and oriented to person, place, and time  Cranial Nerves: No cranial nerve deficit  Psychiatric:         Behavior: Behavior normal          Thought Content: Thought content normal          Judgment: Judgment normal          BMI Counseling: Body mass index is 38 44 kg/m²  The BMI is above normal  Nutrition recommendations include encouraging healthy choices of fruits and vegetables  Exercise recommendations include moderate physical activity 150 minutes/week  Rationale for BMI follow-up plan is due to patient being overweight or obese       Depression Screening and Follow-up Plan: Patient's depression screening was positive with a PHQ-2 score of 4  Their PHQ-9 score was 12  Patient assessed for underlying major depression  Brief counseling provided and recommend additional follow-up/re-evaluation next office visit

## 2022-05-11 ENCOUNTER — EVALUATION (OUTPATIENT)
Dept: PHYSICAL THERAPY | Facility: MEDICAL CENTER | Age: 52
End: 2022-05-11
Payer: OTHER GOVERNMENT

## 2022-05-11 DIAGNOSIS — M75.101 ROTATOR CUFF SYNDROME OF RIGHT SHOULDER: ICD-10-CM

## 2022-05-11 DIAGNOSIS — G56.80 SCAPULOTHORACIC SYNDROME: ICD-10-CM

## 2022-05-11 PROCEDURE — 97161 PT EVAL LOW COMPLEX 20 MIN: CPT | Performed by: PHYSICAL THERAPIST

## 2022-05-13 NOTE — PROGRESS NOTES
PT Evaluation     Today's date: 2022  Patient name: Clemente Aguayo  : 1970  MRN: [id number]  Referring provider: Roosevelt Curtis MD  Dx:   Encounter Diagnosis     ICD-10-CM    1  Rotator cuff syndrome of right shoulder  M75 101 Ambulatory Referral to Physical Therapy   2  Scapulothoracic syndrome  G56 80 Ambulatory Referral to Physical Therapy                  Assessment  Assessment details: Clemente Aguayo is a 46 y o  female was evaluated on 2022  for Rotator cuff syndrome of right shoulder  Scapulothoracic syndrome  Clemente Aguayo has the above listed impairments resulting in functional deficits and negative impact to quality of life  Patient is appropriate for skilled PT intervention to promote maximal return to function and patient specific goals  Patient agrees with outlined treatment plan and all questions were answered to their satisfaction  Impairments: abnormal muscle firing, abnormal muscle tone, abnormal or restricted ROM, impaired physical strength, lacks appropriate home exercise program and pain with function  Understanding of Dx/Px/POC: good   Prognosis: good    Goals  Patient will successfully transition to home exercise program   Patient will be able to manage symptoms independently      Rolanda Shahid will report no pain reaching behind back for toileting    Rolanda Shahid will regain full active and passive shoulder mobility     Plan  Patient would benefit from: skilled PT  Referral necessary: No  Planned modality interventions: thermotherapy: hydrocollator packs  Planned therapy interventions: home exercise program, manual therapy, neuromuscular re-education, patient education, functional ROM exercises, strengthening, stretching, joint mobilization, graded activity, graded exercise, therapeutic exercise, body mechanics training, motor coordination training and activity modification  Frequency: 1x week  Duration in weeks: 12  Treatment plan discussed with: patient        Subjective Evaluation    History of Present Illness  Mechanism of injury: Bety Stapleton is a 46 y o  female presenting to therapy with complaints of right shoulder pain  She notes pain began a few months ago and has continued and progressed  She has felt very limited in her range of motion and finds it difficult to perform her toileting activities  She has not had any injections but has been taking oral antiinflammatory  She is hoping to regain some mobility to allow better performance of basic ADL's   Pain  Current pain rating: 3  At best pain ratin  At worst pain ratin  Quality: dull ache, tight, discomfort, pulling and squeezing    Patient Goals  Patient goals for therapy: decreased pain, return to sport/leisure activities, increased strength, independence with ADLs/IADLs and increased motion          Objective     Postural Observations  Seated posture: fair  Standing posture: fair        Tenderness     Left Shoulder   No tenderness     Right Shoulder  No tenderness     Cervical/Thoracic Screen   Cervical range of motion within normal limits    Neurological Testing     Sensation     Shoulder   Left Shoulder   Intact: light touch    Right Shoulder   Intact: light touch    Active Range of Motion   Left Shoulder   Normal active range of motion    Right Shoulder   Flexion: 150 degrees   Abduction: 150 degrees   External rotation BTH: C3 with pain  Internal rotation BTB: sacrum with pain    Additional Active Range of Motion Details  Restricted posterior capsule mobility      Joint Play     Right Shoulder  Hypomobile in the posterior capsule and inferior capsule       Strength/Myotome Testing     Left Shoulder     Planes of Motion   Flexion: 4+   Extension: 4+   Abduction: 4+   Adduction: 4+   External rotation at 0°: 4+   External rotation at 45°: 4+     Right Shoulder     Planes of Motion   Flexion: 3+   Extension: 3+   Abduction: 3+   Adduction: 3+   External rotation at 0°: 3+   External rotation at 45°: 3+ Precautions: None       Manuals 5 /11            GHJ posterior mobilization in varying degrees of IR AF                                                    Neuro Re-Ed                                                                                                        Ther Ex             Behind the back IR stretch Vs wall            TB row                                                                                           Ther Activity                                       Gait Training                                       Modalities

## 2022-05-25 ENCOUNTER — OFFICE VISIT (OUTPATIENT)
Dept: PHYSICAL THERAPY | Facility: MEDICAL CENTER | Age: 52
End: 2022-05-25
Payer: OTHER GOVERNMENT

## 2022-05-25 DIAGNOSIS — M75.101 ROTATOR CUFF SYNDROME OF RIGHT SHOULDER: ICD-10-CM

## 2022-05-25 DIAGNOSIS — G56.80 SCAPULOTHORACIC SYNDROME: Primary | ICD-10-CM

## 2022-05-25 PROCEDURE — 97110 THERAPEUTIC EXERCISES: CPT | Performed by: PHYSICAL THERAPIST

## 2022-05-25 PROCEDURE — 97140 MANUAL THERAPY 1/> REGIONS: CPT | Performed by: PHYSICAL THERAPIST

## 2022-05-31 NOTE — PROGRESS NOTES
Daily Note     Today's date: 2022  Patient name: Katerin Ramos  : 1970  MRN: [id number]  Referring provider: Marychuy Angeles MD  Dx:   Encounter Diagnosis     ICD-10-CM    1  Scapulothoracic syndrome  G56 80    2  Rotator cuff syndrome of right shoulder  M75  101                   Subjective: Mart Major reports that she is getting some more motion but still finds it difficult to reach behind back       Objective: See treatment diary below      Assessment: Tolerated treatment well  Patient demonstrated fatigue post treatment and exhibited good technique with therapeutic exercises      Plan: Continue per plan of care        Precautions: None       Manuals             GHJ posterior mobilization in varying degrees of IR AF                                                    Neuro Re-Ed                                                                                                        Ther Ex             Behind the back IR stretch Vs wall            TB row Green               Sleeper stretch 30 sec  X 3                                                                              Ther Activity                                       Gait Training                                       Modalities

## 2022-06-01 ENCOUNTER — OFFICE VISIT (OUTPATIENT)
Dept: PHYSICAL THERAPY | Facility: MEDICAL CENTER | Age: 52
End: 2022-06-01
Payer: OTHER GOVERNMENT

## 2022-06-01 DIAGNOSIS — M75.101 ROTATOR CUFF SYNDROME OF RIGHT SHOULDER: ICD-10-CM

## 2022-06-01 DIAGNOSIS — G56.80 SCAPULOTHORACIC SYNDROME: Primary | ICD-10-CM

## 2022-06-01 PROCEDURE — 97110 THERAPEUTIC EXERCISES: CPT | Performed by: PHYSICAL THERAPIST

## 2022-06-01 PROCEDURE — 97140 MANUAL THERAPY 1/> REGIONS: CPT | Performed by: PHYSICAL THERAPIST

## 2022-06-03 NOTE — PROGRESS NOTES
Daily Note     Today's date: 2022  Patient name: Deya Marin  : 1970  MRN: [id number]  Referring provider: Anny Tello MD  Dx:   Encounter Diagnosis     ICD-10-CM    1  Scapulothoracic syndrome  G56 80    2  Rotator cuff syndrome of right shoulder  M75  101                   Subjective: Mariya Souza reports that she is getting some more motion reaching behind back  Objective: See treatment diary below      Assessment: Tolerated treatment well  Patient demonstrated fatigue post treatment and exhibited good technique with therapeutic exercises   She will see how her shoulder responds and will return if needed       Plan: Continue per plan of care        Precautions: None       Manuals             GHJ posterior mobilization in varying degrees of IR AF                                                    Neuro Re-Ed                                                                                                        Ther Ex             Behind the back IR stretch Vs wall            TB row Green               Sleeper stretch 30 sec  X 3                                                                              Ther Activity                                       Gait Training                                       Modalities

## 2022-06-26 DIAGNOSIS — L85.3 XEROSIS OF SKIN: ICD-10-CM

## 2022-06-27 RX ORDER — AMMONIUM LACTATE 12 G/100G
CREAM TOPICAL AS NEEDED
Qty: 280 G | Refills: 2 | Status: SHIPPED | OUTPATIENT
Start: 2022-06-27

## 2022-07-05 ENCOUNTER — OFFICE VISIT (OUTPATIENT)
Dept: URGENT CARE | Facility: MEDICAL CENTER | Age: 52
End: 2022-07-05
Payer: OTHER GOVERNMENT

## 2022-07-05 ENCOUNTER — APPOINTMENT (OUTPATIENT)
Dept: RADIOLOGY | Facility: MEDICAL CENTER | Age: 52
End: 2022-07-05
Payer: OTHER GOVERNMENT

## 2022-07-05 VITALS
OXYGEN SATURATION: 98 % | DIASTOLIC BLOOD PRESSURE: 69 MMHG | HEART RATE: 97 BPM | RESPIRATION RATE: 18 BRPM | SYSTOLIC BLOOD PRESSURE: 133 MMHG | TEMPERATURE: 96.8 F

## 2022-07-05 DIAGNOSIS — S92.511A CLOSED FRACTURE OF PROXIMAL PHALANX OF LESSER TOE OF RIGHT FOOT, INITIAL ENCOUNTER: ICD-10-CM

## 2022-07-05 DIAGNOSIS — S99.921A RIGHT FOOT INJURY, INITIAL ENCOUNTER: Primary | ICD-10-CM

## 2022-07-05 DIAGNOSIS — M79.671 RIGHT FOOT PAIN: ICD-10-CM

## 2022-07-05 PROCEDURE — G0382 LEV 3 HOSP TYPE B ED VISIT: HCPCS

## 2022-07-05 PROCEDURE — 73630 X-RAY EXAM OF FOOT: CPT

## 2022-07-05 RX ORDER — DICLOFENAC SODIUM 75 MG/1
TABLET, DELAYED RELEASE ORAL
COMMUNITY
Start: 2022-06-07

## 2022-07-05 NOTE — PROGRESS NOTES
[facility]        NAME: Meghan Leos is a 46 y o  female  : 1970    MRN: [id number]  DATE: 2022  TIME: 9:41 AM    Assessment and Plan   Right foot injury, initial encounter [[id number] [id number]]  1  Right foot injury, initial encounter  XR foot 3+ vw right   2  Closed fracture of proximal phalanx of lesser toe of right foot, initial encounter       Preliminary x-ray read-closed fracture of proximal phalanx of 5th digit  Will review official radiology read when available  Orthopedic injury treatment    Date/Time: 2022 9:40 AM  Performed by: TAMEKA Glover  Authorized by: TAMEKA Glover     Patient Location:  [facility]  [place] Protocol:  Consent: Verbal consent obtained  Written consent obtained  Risks and benefits: risks, benefits and alternatives were discussed  Consent given by: patient  Time out: Immediately prior to procedure a "time out" was called to verify the correct patient, procedure, equipment, support staff and site/side marked as required  Timeout called at: 2022 9:40 AM   Patient understanding: patient states understanding of the procedure being performed  Patient consent: the patient's understanding of the procedure matches consent given  Procedure consent: procedure consent matches procedure scheduled  Relevant documents: relevant documents present and verified  Required items: required blood products, implants, devices, and special equipment available  Patient identity confirmed: verbally with patient      Injury location:  Foot  Location details:  Right foot  Injury type:  Fracture  Neurovascular status: Neurovascularly intact    Distal perfusion: normal    Neurological function: normal    Range of motion: normal    Local anesthesia used?: No    Manipulation performed?: No    Cast type: Cam boot    Neurovascular status: Neurovascularly intact    Distal perfusion: normal    Neurological function: normal    Range of motion: normal    Patient tolerance: Patient tolerated the procedure well with no immediate complications          Patient Instructions     Please wear the Cam boot until we receive the official radiology report  I will call you once I receive the report  Ice to affected area 3-4 times a day for approximately 20 minutes at a time  Ibuprofen and/or Tylenol as needed for discomfort  Follow-up with orthopedics  Follow-up with your primary care provider  Go to ED for worsening symptoms  Chief Complaint     Chief Complaint   Patient presents with   [address] [facility] [facility] [address] [facility]     Patient c/o R foot pain with swelling after a rolling pallet fell on hit on Sunday night  History of Present Illness       Patient reports she was cleaning out the garage yesterday evening when and rolling Pallet fell onto her right foot  She is complaining of pain and swelling to the top and lateral aspects of right foot  She reports icing the injury initially  She denies other alleviating factors tried  Patient reports significant PMH of anxiety, depression, GERD, hyperlipidemia, sleep apnea, and migraines  Review of Systems   Review of Systems   Constitutional: Negative for fatigue and fever  Respiratory: Negative for cough and shortness of breath  Cardiovascular: Negative for chest pain  Musculoskeletal: Positive for arthralgias  All other systems reviewed and are negative          Current Medications       Current Outpatient Medications:     amitriptyline (ELAVIL) 25 mg tablet, , Disp: , Rfl:     ammonium lactate (LAC-HYDRIN) 12 % cream, APPLY TOPICALLY AS NEEDED FOR DRY SKIN, Disp: 280 g, Rfl: 2    Ascorbic Acid (vitamin C) 1000 MG tablet, Take 1,000 mg by mouth daily, Disp: , Rfl:     atorvastatin (LIPITOR) 10 mg tablet, TAKE 1 TABLET AT BEDTIME, Disp: 90 tablet, Rfl: 3    B Complex Vitamins (VITAMIN B COMPLEX) TABS, Take by mouth, Disp: , Rfl:     Cholecalciferol (VITAMIN D) 2000 units CAPS, Take by mouth, Disp: , Rfl:     Collagen-Vitamin C-Biotin (Collagen 1500/C) 500-50-0 8 MG CAPS, , Disp: , Rfl:     diclofenac (VOLTAREN) 75 mg EC tablet, TAKE 1 TABLET BY MOUTH 2 TIMES A DAY WITH MEALS , Disp: , Rfl:     Elderberry 575 MG/5ML SYRP, , Disp: , Rfl:     EPINEPHrine (EPIPEN) 0 3 mg/0 3 mL SOAJ, Inject 0 3 mL (0 3 mg total) into a muscle once for 1 dose, Disp: 0 6 mL, Rfl: 0    fluticasone (FLONASE) 50 mcg/act nasal spray, 1 spray into each nostril 2 (two) times a day, Disp: 1 Bottle, Rfl: 0    levocetirizine (XYZAL) 5 MG tablet, Take 1 tablet (5 mg total) by mouth every evening (Patient not taking: Reported on 4/27/2022 ), Disp: 30 tablet, Rfl: 5    Multiple Vitamin (MULTIVITAMIN ADULT PO), , Disp: , Rfl:     ondansetron (ZOFRAN-ODT) 4 mg disintegrating tablet, Take 1 tablet every 8 hours as needed for nauea, Disp: , Rfl:     pantoprazole (PROTONIX) 40 mg tablet, , Disp: , Rfl:     predniSONE 20 mg tablet, Take 20mg PO daily starting 2 days before rush immunotherapy date and the day of rush , Disp: 10 tablet, Rfl: 0    Turmeric (QC TUMERIC COMPLEX PO), , Disp: , Rfl:     Zinc Sulfate (ZINC 15 PO), , Disp: , Rfl:     Current Allergies     Allergies as of 07/05/2022 - Reviewed 07/05/2022   Allergen Reaction Noted    Acetazolamide Hives 10/20/2020    Other Hives 10/20/2020    Amoxicillin-pot clavulanate  11/19/2013    Iodinated diagnostic agents  11/21/2019    Iothalamate Hives 07/31/2012    Sulfa antibiotics  11/19/2013            The following portions of the patient's history were reviewed and updated as appropriate: allergies, current medications, past family history, past medical history, past social history, past surgical history and problem list      Past Medical History:   Diagnosis Date    Allergic     Allergic rhinitis     Anxiety     Depression     Eczema     GERD (gastroesophageal reflux disease)     Hyperlipidemia     Migraine     Nasal congestion     Sleep apnea     on CPAP treatment    Sleep difficulties Past Surgical History:   Procedure Laterality Date     SECTION  1991    CHOLECYSTECTOMY  2013    HEMORRHOID SURGERY  1998    KNEE SURGERY      MENISCECTOMY      TUBAL LIGATION  2007    WISDOM TOOTH EXTRACTION         Family History   Problem Relation Age of Onset    Alzheimer's disease Father     Diabetes Father     Leukemia Mother          Medications have been verified  Objective   /69   Pulse 97   Temp (!) 96 8 °F (36 °C)   Resp 18   LMP 2018 (Exact Date)   SpO2 98%        Physical Exam     Physical Exam  Vitals and nursing note reviewed  Constitutional:       General: She is not in acute distress  Appearance: Normal appearance  She is not toxic-appearing  HENT:      Head: Normocephalic and atraumatic  Right Ear: External ear normal       Left Ear: External ear normal       Nose: Nose normal       Mouth/Throat:      Mouth: Mucous membranes are moist       Pharynx: Oropharynx is clear  Eyes:      General: No scleral icterus  Right eye: No discharge  Left eye: No discharge  Conjunctiva/sclera: Conjunctivae normal    Cardiovascular:      Rate and Rhythm: Normal rate  Pulmonary:      Effort: Pulmonary effort is normal    Musculoskeletal:         General: Normal range of motion  Cervical back: Normal range of motion  Right ankle: Normal       Right Achilles Tendon: Normal       Left ankle: Normal       Left Achilles Tendon: Normal       Right foot: Normal capillary refill  Swelling, tenderness and bony tenderness present  No deformity, laceration or crepitus  Normal pulse  Left foot: Normal         Legs:    Skin:     General: Skin is warm and dry  Neurological:      General: No focal deficit present  Mental Status: She is alert and oriented to person, place, and time     Psychiatric:         Mood and Affect: Mood normal          Behavior: Behavior normal

## 2022-07-05 NOTE — PATIENT INSTRUCTIONS
Please wear the Cam boot until we receive the official radiology report  I will call you once I receive the report  Ice to affected area 3-4 times a day for approximately 20 minutes at a time  Ibuprofen and/or Tylenol as needed for discomfort  Follow-up with orthopedics  Follow-up with your primary care provider  Go to ED for worsening symptoms

## 2022-07-21 ENCOUNTER — TELEMEDICINE (OUTPATIENT)
Dept: FAMILY MEDICINE CLINIC | Facility: CLINIC | Age: 52
End: 2022-07-21
Payer: OTHER GOVERNMENT

## 2022-07-21 VITALS — OXYGEN SATURATION: 93 % | WEIGHT: 217 LBS | HEIGHT: 63 IN | HEART RATE: 111 BPM | BODY MASS INDEX: 38.45 KG/M2

## 2022-07-21 DIAGNOSIS — U07.1 COVID: Primary | ICD-10-CM

## 2022-07-21 PROCEDURE — 99214 OFFICE O/P EST MOD 30 MIN: CPT | Performed by: FAMILY MEDICINE

## 2022-07-21 NOTE — PROGRESS NOTES
Virtual Regular Visit    Verification of patient location:    Patient is located in the following state in which I hold an active license [place]      Assessment/Plan:    Problem List Items Addressed This Visit    None     Visit Diagnoses     COVID    -  Primary    Relevant Medications    molnupiravir 200 mg capsule        We discussed isolation protocol per CDC guidelines  She will continue supportive care fluids and rest   Follow-up if symptoms worsen  Reason for visit is   Chief Complaint   Patient presents with    COVID-19     Tested positive last night, chest pain and lots of mucus, cough    Virtual Regular Visit        Encounter provider Lencho Gama DO    Provider located at [id number] [place] [id number]      Recent Visits  No visits were found meeting these conditions  Showing recent visits within past 7 days and meeting all other requirements  Today's Visits  Date Type Provider Dept   07/21/22 Telemedicine Lencho Gama DO [address] [facility] today's visits and meeting all other requirements  Future Appointments  No visits were found meeting these conditions  Showing future appointments within next 150 days and meeting all other requirements       The patient was identified by name and date of birth  Andie Queen was informed that this is a telemedicine visit and that the visit is being conducted through [address] [place] [address] and patient was informed that this is a secure, HIPAA-compliant platform  She agrees to proceed     My office door was closed  No one else was in the room  She acknowledged consent and understanding of privacy and security of the video platform  The patient has agreed to participate and understands they can discontinue the visit at any time  Patient is aware this is a billable service       Subjective  Andie Queen is a 46 y o  female   Chief Complaint   Patient presents with    COVID-19 Tested positive last night, chest pain and lots of mucus, cough    Virtual Regular Visit              Patient presents with:  COVID-19: Tested positive last night, chest pain and lots of mucus, cough  Virtual Regular Visit           Past Medical History:   Diagnosis Date    Allergic     Allergic rhinitis     Anxiety     Depression     Eczema     GERD (gastroesophageal reflux disease)     Hyperlipidemia     Migraine     Nasal congestion     Sleep apnea     on CPAP treatment    Sleep difficulties        Past Surgical History:   Procedure Laterality Date     SECTION  1991    CHOLECYSTECTOMY  2013    HEMORRHOID SURGERY  1998    KNEE SURGERY      MENISCECTOMY      TUBAL LIGATION      WISDOM TOOTH EXTRACTION         Current Outpatient Medications   Medication Sig Dispense Refill    ammonium lactate (LAC-HYDRIN) 12 % cream APPLY TOPICALLY AS NEEDED FOR DRY SKIN 280 g 2    Ascorbic Acid (vitamin C) 1000 MG tablet Take 1,000 mg by mouth daily      atorvastatin (LIPITOR) 10 mg tablet TAKE 1 TABLET AT BEDTIME 90 tablet 3    B Complex Vitamins (VITAMIN B COMPLEX) TABS Take by mouth      Cholecalciferol (VITAMIN D) 2000 units CAPS Take by mouth      EPINEPHrine (EPIPEN) 0 3 mg/0 3 mL SOAJ Inject 0 3 mL (0 3 mg total) into a muscle once for 1 dose 0 6 mL 0    fluticasone (FLONASE) 50 mcg/act nasal spray 1 spray into each nostril 2 (two) times a day 1 Bottle 0    molnupiravir 200 mg capsule Take 4 capsules (800 mg total) by mouth every 12 (twelve) hours for 5 days 40 capsule 0    Multiple Vitamin (MULTIVITAMIN ADULT PO)       ondansetron (ZOFRAN-ODT) 4 mg disintegrating tablet Take 1 tablet every 8 hours as needed for nauea      pantoprazole (PROTONIX) 40 mg tablet       Turmeric (QC TUMERIC COMPLEX PO)       amitriptyline (ELAVIL) 25 mg tablet       Collagen-Vitamin C-Biotin (Collagen 1500/C) 500-50-0 8 MG CAPS       diclofenac (VOLTAREN) 75 mg EC tablet TAKE 1 TABLET BY MOUTH 2 TIMES A DAY WITH MEALS   Elderberry 575 MG/5ML SYRP       levocetirizine (XYZAL) 5 MG tablet Take 1 tablet (5 mg total) by mouth every evening (Patient not taking: Reported on 4/27/2022 ) 30 tablet 5    predniSONE 20 mg tablet Take 20mg PO daily starting 2 days before rush immunotherapy date and the day of rush  10 tablet 0    Zinc Sulfate (ZINC 15 PO)        No current facility-administered medications for this visit  Allergies   Allergen Reactions    Acetazolamide Hives    Other Hives    Amoxicillin-Pot Clavulanate     Iodinated Diagnostic Agents      Needs to be prepped for IVC    Iothalamate Hives     Contrast dye    Sulfa Antibiotics        Review of Systems   Constitutional: Negative for fever  HENT: Positive for congestion  Negative for sore throat  Respiratory: Positive for cough and shortness of breath  Cardiovascular: Negative  Gastrointestinal: Negative  Video Exam    Vitals:    07/21/22 1039   Pulse: (!) 111   SpO2: 93%   Weight: 98 4 kg (217 lb)   Height: 5' 3" (1 6 m)       Physical Exam  Constitutional:       Appearance: She is well-developed  HENT:      Head: Normocephalic and atraumatic  Eyes:      Pupils: Pupils are equal, round, and reactive to light  Neck:      Vascular: No JVD  Cardiovascular:      Rate and Rhythm: Normal rate  Pulmonary:      Effort: Pulmonary effort is normal    Abdominal:      Palpations: Abdomen is soft  Musculoskeletal:      Cervical back: Normal range of motion  Lymphadenopathy:      Cervical: No cervical adenopathy  Neurological:      Mental Status: She is alert and oriented to person, place, and time  I spent 5 minutes directly with the patient during this visit    VIRTUAL VISIT DISCLAIMER      Jody Ba verbally agrees to participate in [facility]   Pt is aware that [facility] could be limited without vital signs or the ability to perform a full hands-on physical Jenny Infante understands she or the provider may request at any time to terminate the video visit and request the patient to seek care or treatment in person

## 2022-07-21 NOTE — LETTER
July 21, 2022     Patient: Austin Seals  YOB: 1970  Date of Visit: 7/21/2022      To Whom it May Concern:    Austin Seals is under my professional care  Monae Ortez was seen in my office on 7/21/2022  Monae Ortez may return to work on Monday July 25, 2022  If you have any questions or concerns, please don't hesitate to call           Sincerely,          Jeanie Ferreira,         CC: No Recipients

## 2022-07-23 ENCOUNTER — OFFICE VISIT (OUTPATIENT)
Dept: URGENT CARE | Age: 52
End: 2022-07-23
Payer: OTHER GOVERNMENT

## 2022-07-23 VITALS
TEMPERATURE: 98.7 F | OXYGEN SATURATION: 97 % | BODY MASS INDEX: 38.44 KG/M2 | RESPIRATION RATE: 20 BRPM | WEIGHT: 217 LBS | HEART RATE: 114 BPM

## 2022-07-23 DIAGNOSIS — U07.1 COVID: Primary | ICD-10-CM

## 2022-07-23 PROCEDURE — 99213 OFFICE O/P EST LOW 20 MIN: CPT | Performed by: PHYSICIAN ASSISTANT

## 2022-07-23 RX ORDER — AZELASTINE 1 MG/ML
1 SPRAY, METERED NASAL 2 TIMES DAILY
Qty: 1 ML | Refills: 0 | Status: SHIPPED | OUTPATIENT
Start: 2022-07-23

## 2022-07-23 NOTE — PATIENT INSTRUCTIONS
Use medications as directed for symptomatic relief as needed  Motrin and/or Tylenol as needed for fevers body aches headaches and pains  Drink plenty of fluids stay well hydrated  Continue home medications as directed  Follow up with PCP in 3-5 days  Proceed to  ER if symptoms worsen  COVID-19 (Coronavirus Disease 2019)   AMBULATORY CARE:   What you need to know about COVID-19:  COVID-19 is the disease caused by a coronavirus first discovered in December 2019  Coronaviruses generally cause upper respiratory (nose, throat, and lung) infections, such as a cold  The 2019 virus spreads quickly and easily  It can be spread starting 2 to 3 days before symptoms even begin  What you need to know about variants: The virus has changed into several new forms (called variants) since it was discovered  The variants may be more contagious (easily spread) than the original form  Some may also cause more severe illness than others  Signs and symptoms of COVID-19  may not develop  Signs and symptoms usually start about 5 days after infection but can take 2 to 14 days  You may feel like you have the flu or a bad cold  Some signs and symptoms go away in a few days  Others can last weeks, months, or possibly years  You may have any of the following:  A cough    Shortness of breath or trouble breathing that may become severe    A fever    Chills that might include shaking    Muscle pain, body aches, or a headache    A sore throat    Sudden changes or loss of your taste or smell    Feeling mentally and physically tired (fatigue)    Congestion (stuffy head and nose), or a runny nose    Diarrhea, nausea, or vomiting    Call your local emergency number (911 in the [address] [place] [address],[address]) if:   You have trouble breathing or shortness of breath at rest     You have chest pain or pressure that lasts longer than 5 minutes  You become confused or hard to wake  Your lips or face are blue      Seek care immediately if:   You have a fever of 104°F (40°C) or higher  Call your doctor if:   You have symptoms of COVID-19  You have questions or concerns about your condition or care  How COVID-19 is diagnosed:  Testing is offered at many sites  You may need to quarantine until you get your results  Any of the following tests may be used:  A viral PCR test  shows if you have a current infection  A sample is taken from your nose or throat with a swab  You may need to wait 1 or more days to get the test results  An antigen test  shows if you have a protein from the COVID-19 virus  This test is often called a rapid test because the results can be available in 30 minutes or less  An antibody test  shows if you had a recent or past infection  Blood samples are used for this test  Antibodies are made by your immune system to fight the virus that causes COVID-19  Antibodies form 1 to 3 weeks after you are infected  This test is not used to show if you are immune to the virus  A CT, MRI, ultrasound, or x-ray  may be used to check for complications of [id number]  These may include pneumonia, blood clots, or other complications  Treatment:   Mild symptoms  may get better on their own  Some treatments have emergency use authorization (EUA)  Examples include monoclonal antibodies and convalescent plasma  These may be given to help prevent worsening of your symptoms  You may also need any of the following:    Decongestants  help reduce nasal congestion and help you breathe more easily  If you take decongestant pills, they may make you feel restless or cause problems with your sleep  Do not use decongestant sprays for more than a few days  Cough suppressants  help reduce coughing  Ask your healthcare provider which type of cough medicine is best for you  To soothe a sore throat,  gargle with warm salt water, or use throat lozenges or a throat spray  Drink more liquids to thin and loosen mucus and to prevent dehydration      NSAIDs or acetaminophen  can help lower a fever and relieve body aches or a headache  Follow directions  If not taken correctly, NSAIDs can cause kidney damage and acetaminophen can cause liver damage  Severe or life-threatening symptoms  are treated in the hospital  You may need any of the following:     Medicines  may be given to fight the virus or treat inflammation  Blood thinners  help prevent or treat blood clots  If you have a deep vein thrombosis (DVT) or pulmonary embolism (PE), you may need to use blood thinners for at least 3 months  Extra oxygen  may be given if you have respiratory failure  This means your lungs cannot get enough oxygen into your blood and out to your organs  A ventilator  may be used to help you breathe  What you need to know about health problems the virus may cause: You may develop long-term health problems caused by the virus  Your risk is higher if you are 65 or older  A weak immune system, obesity, diabetes, chronic kidney disease, or a heart or lung condition can also increase your risk  Your risk is also higher if you are a current or former cigarette smoker  COVID-19 can lead to any of the following:  Multisymptom inflammatory syndrome in adults (MIS-A) or in children (MIS-C), causing inflammation in the heart, digestive system, skin, or brain    Shortness of breath, serious lower respiratory conditions, such as pneumonia or acute respiratory distress syndrome (ARDS)    Blood clots or blood vessel damage    Organ damage from a lack of oxygen or from blood clots    Sleep problems    Problems thinking clearly, remembering information, or concentrating    Mood changes, depression, or anxiety    Long-term problems tasting or smelling    Loss of appetite and weight loss    Nerve pain    Fatigue (feeling mentally and physically tired)    What you need to know about COVID-19 vaccines:  Healthcare providers recommend a COVID-19 vaccine, even if you have already had COVID-19   You are considered fully vaccinated against COVID-19 two weeks after the final dose of any COVID-19 vaccine  Let your healthcare provider know when you have received the final dose of the vaccine  Make a copy of your vaccination card  Keep the original with you in case you need to show it  Keep the copy in a safe place  COVID-19 vaccines are given as a shot in 1 or 2 doses  Vaccination is recommended for everyone 5 years or older  One 2-dose vaccine is fully approved  for those 16 years or older  This vaccine also has an emergency use authorization (EUA) for children 11to 13years old  No vaccine is currently available for children younger than 5 years  A booster (additional) dose  is given to help the immune system continue to protect against severe COVID-19  A booster is recommended for all adults 18 or older  The booster can be a different brand of the COVID-19 vaccine than you originally received  The timing for the booster depends on the type of vaccine you received:    1-dose vaccine: The booster is given at least 2 months after you received the vaccine  2-dose vaccine: The booster is given at least 5 or 6 months after the second dose  A booster can be given to adolescents 15to 16years old  Only 1 COVID-19 vaccine has this EUA  The booster is given at least 5 months after the second dose of the original vaccine series  A booster is recommended for immunocompromised children 11to 6years old  Only 1 COVID-19 vaccine has this EUA  The booster is given 28 days after the second dose  Continue social distancing and other measures, even after you get the vaccine  Although it is not common, you can become infected after you get the vaccine  You may also be able to pass the virus to others without knowing you are infected  After you get the vaccine, check local, national, and international travel rules  You may need to be tested before you travel   Some countries require proof of a negative test before you travel  You may also need to quarantine after you return  Medicine may be given to prevent infection  The medicine can be given if you are at high risk for infection and cannot get the vaccine  It can also be given if your immune system does not respond well to the vaccine  How the 2019 coronavirus spreads:   Droplets are the main way all coronaviruses spread  The virus travels in droplets that form when a person talks, sings, coughs, or sneezes  The droplets can also float in the air for minutes or hours  Infection happens when you breathe in the droplets or get them in your eyes or nose  Close personal contact with an infected person increases your risk for infection  This means being within 6 feet (2 meters) of the person for at least 15 minutes over 24 hours  Person-to-person contact can spread the virus  For example, a person with the virus on his or her hands can spread it by shaking hands with someone  The virus can stay on objects and surfaces for up to 3 days  You may become infected by touching the object or surface and then touching your eyes or mouth  Help lower the risk for COVID-19:   Wash your hands often throughout the day  Use soap and water  Rub your soapy hands together, lacing your fingers, for at least 20 seconds  Rinse with warm, running water  Dry your hands with a clean towel or paper towel  Use hand  that contains alcohol if soap and water are not available  Teach children how to wash their hands and use hand   Cover sneezes and coughs  Turn your face away and cover your mouth and nose with a tissue  Throw the tissue away  Use the bend of your arm if a tissue is not available  Then wash your hands well with soap and water or use hand   Teach children how to cover a cough or sneeze  Wear a face covering (mask) when needed  Use a cloth covering with at least 2 layers   You can also create layers by putting a cloth covering over a disposable non-medical mask  Cover your mouth and your nose  Follow worldwide, national, and local social distancing guidelines  Keep at least 6 feet (2 meters) between you and others  Try not to touch your face  If you get the virus on your hands, you can transfer it to your eyes, nose, or mouth and become infected  You can also transfer it to objects, surfaces, or people  Clean and disinfect high-touch surfaces and objects often  Use disinfecting wipes, or make a solution of 4 teaspoons of bleach in 1 quart (4 cups) of water  Ask about other vaccines you may need  Get the influenza (flu) vaccine as soon as recommended each year, usually starting in September or October  Get the pneumonia vaccine if recommended  Your healthcare provider can tell you if you should also get other vaccines, and when to get them  Follow social distancing guidelines:  National and local social distancing rules vary  Rules and restrictions may change over time as restrictions are lifted  The following are general guidelines:  Stay home if you are sick or think you may have COVID-19  It is important to stay home if you are waiting for a testing appointment or for test results  Avoid close physical contact with anyone who does not live in your home  Do not shake hands with, hug, or kiss a person as a greeting  If you must use public transportation (such as a bus or subway), try to sit or stand away from others  Wear your face covering  Avoid in-person gatherings and crowds  Attend virtually if possible  Follow up with your doctor as directed:  Write down your questions so you remember to ask them during your visits    For more information:   [facility]  [address] , [address]  Phone: [phone]  Web Address: [employer] com br    © Copyright [employer] 2022 Information is for End User's use only and may not be sold, redistributed or otherwise used for commercial purposes  All illustrations and images included in CareNotes® are the copyrighted property of A D A M , Inc  or [employer]  The above information is an  only  It is not intended as medical advice for individual conditions or treatments  Talk to your doctor, nurse or pharmacist before following any medical regimen to see if it is safe and effective for you

## 2022-07-23 NOTE — PROGRESS NOTES
[facility]        NAME: Jose Roth is a 46 y o  female  : 1970    MRN: [id number]  DATE: 2022  TIME: 11:07 AM    Assessment and Plan   COVID [U07 1]  1  COVID  azelastine (ASTELIN) 0 1 % nasal spray    al mag oxide-diphenhydramine-lidocaine viscous (MAGIC MOUTHWASH) 1:1:1 suspension         Patient Instructions     Use medications as directed for symptomatic relief as needed  Motrin and/or Tylenol as needed for fevers body aches headaches and pains  Drink plenty of fluids stay well hydrated  Continue home medications as directed  Follow up with PCP in 3-5 days  Proceed to  ER if symptoms worsen  Chief Complaint     Chief Complaint   Patient presents with    Nasal Congestion     Nasal congestion, chest pain/congestion, cough, sore throat x 4 days         History of Present Illness       57-year-old female presents with COVID  Patient reports diagnosed on Wednesday with COVID and started on antiviral therapy  Patient reports she is having severe sore throats and can not breathe out of her nose  Also having some cough and little bit of congestion  Denies any abdominal pain nausea vomiting diarrhea  Sore Throat   This is a new problem  The current episode started in the past 7 days  The problem has been waxing and waning  Neither side of throat is experiencing more pain than the other  The maximum temperature recorded prior to her arrival was 100 4 - 100 9 F  The pain is severe  Associated symptoms include congestion and coughing  Pertinent negatives include no abdominal pain, diarrhea, drooling, ear pain, headaches, hoarse voice, shortness of breath, stridor, swollen glands or trouble swallowing  She has tried nothing for the symptoms  The treatment provided no relief  Review of Systems   Review of Systems   Constitutional: Negative  HENT: Positive for congestion, postnasal drip and sore throat  Negative for drooling, ear pain, hoarse voice and trouble swallowing  Eyes: Negative  Respiratory: Positive for cough  Negative for shortness of breath and stridor  Cardiovascular: Negative  Gastrointestinal: Negative  Negative for abdominal pain and diarrhea  Musculoskeletal: Negative  Skin: Negative  Neurological: Negative  Negative for headaches           Current Medications       Current Outpatient Medications:     al mag oxide-diphenhydramine-lidocaine viscous (MAGIC MOUTHWASH) 1:1:1 suspension, Swish and spit 10 mL every 4 (four) hours as needed for mouth pain or discomfort, Disp: 90 mL, Rfl: 0    Ascorbic Acid (vitamin C) 1000 MG tablet, Take 1,000 mg by mouth daily, Disp: , Rfl:     atorvastatin (LIPITOR) 10 mg tablet, TAKE 1 TABLET AT BEDTIME, Disp: 90 tablet, Rfl: 3    azelastine (ASTELIN) 0 1 % nasal spray, 1 spray into each nostril 2 (two) times a day Use in each nostril as directed, Disp: 1 mL, Rfl: 0    B Complex Vitamins (VITAMIN B COMPLEX) TABS, Take by mouth, Disp: , Rfl:     Cholecalciferol (VITAMIN D) 2000 units CAPS, Take by mouth, Disp: , Rfl:     fluticasone (FLONASE) 50 mcg/act nasal spray, 1 spray into each nostril 2 (two) times a day, Disp: 1 Bottle, Rfl: 0    molnupiravir 200 mg capsule, Take 4 capsules (800 mg total) by mouth every 12 (twelve) hours for 5 days, Disp: 40 capsule, Rfl: 0    Multiple Vitamin (MULTIVITAMIN ADULT PO), , Disp: , Rfl:     pantoprazole (PROTONIX) 40 mg tablet, , Disp: , Rfl:     Turmeric (QC TUMERIC COMPLEX PO), , Disp: , Rfl:     amitriptyline (ELAVIL) 25 mg tablet, , Disp: , Rfl:     ammonium lactate (LAC-HYDRIN) 12 % cream, APPLY TOPICALLY AS NEEDED FOR DRY SKIN, Disp: 280 g, Rfl: 2    Collagen-Vitamin C-Biotin (Collagen 1500/C) 500-50-0 8 MG CAPS, , Disp: , Rfl:     diclofenac (VOLTAREN) 75 mg EC tablet, TAKE 1 TABLET BY MOUTH 2 TIMES A DAY WITH MEALS , Disp: , Rfl:     Elderberry 575 MG/5ML SYRP, , Disp: , Rfl:     EPINEPHrine (EPIPEN) 0 3 mg/0 3 mL SOAJ, Inject 0 3 mL (0 3 mg total) into a muscle once for 1 dose, Disp: 0 6 mL, Rfl: 0    levocetirizine (XYZAL) 5 MG tablet, Take 1 tablet (5 mg total) by mouth every evening (Patient not taking: Reported on 2022 ), Disp: 30 tablet, Rfl: 5    ondansetron (ZOFRAN-ODT) 4 mg disintegrating tablet, Take 1 tablet every 8 hours as needed for nauea (Patient not taking: Reported on 2022), Disp: , Rfl:     predniSONE 20 mg tablet, Take 20mg PO daily starting 2 days before rush immunotherapy date and the day of rush , Disp: 10 tablet, Rfl: 0    Zinc Sulfate (ZINC 15 PO), , Disp: , Rfl:     Current Allergies     Allergies as of 2022 - Reviewed 2022   Allergen Reaction Noted    Acetazolamide Hives 10/20/2020    Other Hives 10/20/2020    Amoxicillin-pot clavulanate  2013    Iodinated diagnostic agents  2019    Iothalamate Hives 2012    Sulfa antibiotics  2013            The following portions of the patient's history were reviewed and updated as appropriate: allergies, current medications, past family history, past medical history, past social history, past surgical history and problem list      Past Medical History:   Diagnosis Date    Allergic     Allergic rhinitis     Anxiety     Depression     Eczema     Fibromyalgia     GERD (gastroesophageal reflux disease)     Hyperlipidemia     Migraine     Nasal congestion     Sleep apnea     on CPAP treatment    Sleep difficulties        Past Surgical History:   Procedure Laterality Date    CARPAL TUNNEL RELEASE       SECTION  1991    CHOLECYSTECTOMY  2013    HEMORRHOID SURGERY  1998    KNEE SURGERY      MENISCECTOMY      TUBAL LIGATION      WISDOM TOOTH EXTRACTION         Family History   Problem Relation Age of Onset    Alzheimer's disease Father     Diabetes Father     Leukemia Mother          Medications have been verified          Objective   Pulse (!) 114   Temp 98 7 °F (37 1 °C)   Resp 20   Wt 98 4 kg (217 lb)   LMP 01/12/2018 (Exact Date)   SpO2 97%   BMI 38 44 kg/m²   Patient's last menstrual period was 01/12/2018 (exact date)  Physical Exam     Physical Exam  Vitals and nursing note reviewed  Constitutional:       General: She is not in acute distress  Appearance: Normal appearance  She is well-developed  HENT:      Head: Normocephalic and atraumatic  Right Ear: Hearing, tympanic membrane, ear canal and external ear normal  There is no impacted cerumen  Left Ear: Hearing, tympanic membrane, ear canal and external ear normal  There is no impacted cerumen  Nose: Congestion and rhinorrhea present  Mouth/Throat:      Pharynx: Uvula midline  Posterior oropharyngeal erythema (Moderate) present  No oropharyngeal exudate  Eyes:      General:         Right eye: No discharge  Left eye: No discharge  Conjunctiva/sclera: Conjunctivae normal    Cardiovascular:      Rate and Rhythm: Normal rate and regular rhythm  Heart sounds: Normal heart sounds  No murmur heard  Pulmonary:      Effort: Pulmonary effort is normal  No respiratory distress  Breath sounds: Normal breath sounds  No wheezing or rales  Abdominal:      General: Bowel sounds are normal       Palpations: Abdomen is soft  Tenderness: There is no abdominal tenderness  Musculoskeletal:         General: Normal range of motion  Cervical back: Normal range of motion and neck supple  Lymphadenopathy:      Cervical: No cervical adenopathy  Skin:     General: Skin is warm and dry  Neurological:      Mental Status: She is alert and oriented to person, place, and time     Psychiatric:         Mood and Affect: Mood normal

## 2022-08-04 ENCOUNTER — TELEPHONE (OUTPATIENT)
Dept: FAMILY MEDICINE CLINIC | Facility: CLINIC | Age: 52
End: 2022-08-04

## 2022-08-04 DIAGNOSIS — U07.1 COVID: Primary | ICD-10-CM

## 2022-08-04 RX ORDER — GUAIFENESIN/DEXTROMETHORPHAN 100-10MG/5
5 SYRUP ORAL 3 TIMES DAILY PRN
Qty: 473 ML | Refills: 0 | Status: SHIPPED | OUTPATIENT
Start: 2022-08-04

## 2022-08-04 RX ORDER — BENZONATATE 200 MG/1
200 CAPSULE ORAL 3 TIMES DAILY PRN
Qty: 30 CAPSULE | Refills: 0 | Status: SHIPPED | OUTPATIENT
Start: 2022-08-04

## 2022-08-04 NOTE — TELEPHONE ENCOUNTER
Patient called and stated she would like to know if you can call in a Rx for her cough, she tested positive for Covid  Patient finished the paxlovid but still has a bad cough  Patient states cough is productive with a little mucus

## 2022-10-12 PROBLEM — J01.00 ACUTE NON-RECURRENT MAXILLARY SINUSITIS: Status: RESOLVED | Noted: 2021-07-13 | Resolved: 2022-10-12

## 2022-10-12 PROBLEM — Z00.00 ROUTINE ADULT HEALTH MAINTENANCE: Status: RESOLVED | Noted: 2022-01-27 | Resolved: 2022-10-12

## 2022-10-14 ENCOUNTER — TELEPHONE (OUTPATIENT)
Dept: OTHER | Facility: OTHER | Age: 52
End: 2022-10-14

## 2022-10-14 DIAGNOSIS — R73.01 IMPAIRED FASTING GLUCOSE: Primary | ICD-10-CM

## 2022-10-14 DIAGNOSIS — E78.00 HYPERCHOLESTEROLEMIA: ICD-10-CM

## 2022-10-14 DIAGNOSIS — E78.5 HYPERLIPIDEMIA, UNSPECIFIED HYPERLIPIDEMIA TYPE: ICD-10-CM

## 2022-10-14 NOTE — TELEPHONE ENCOUNTER
Pt called in stating she would like to schedule a  full blood work up and especially glucose  She's prediabetic and has a monitor and her sugar has been kind of high lately  She is requesting a call back

## 2022-10-25 ENCOUNTER — OFFICE VISIT (OUTPATIENT)
Dept: FAMILY MEDICINE CLINIC | Facility: CLINIC | Age: 52
End: 2022-10-25
Payer: OTHER GOVERNMENT

## 2022-10-25 VITALS
WEIGHT: 208 LBS | DIASTOLIC BLOOD PRESSURE: 78 MMHG | BODY MASS INDEX: 36.86 KG/M2 | TEMPERATURE: 99.2 F | SYSTOLIC BLOOD PRESSURE: 138 MMHG | OXYGEN SATURATION: 98 % | RESPIRATION RATE: 16 BRPM | HEART RATE: 105 BPM | HEIGHT: 63 IN

## 2022-10-25 DIAGNOSIS — E78.00 HYPERCHOLESTEROLEMIA: Primary | ICD-10-CM

## 2022-10-25 DIAGNOSIS — R73.01 IMPAIRED FASTING GLUCOSE: ICD-10-CM

## 2022-10-25 DIAGNOSIS — G89.29 CHRONIC PAIN OF BOTH KNEES: ICD-10-CM

## 2022-10-25 DIAGNOSIS — E66.01 SEVERE OBESITY (BMI 35.0-39.9) WITH COMORBIDITY (HCC): ICD-10-CM

## 2022-10-25 DIAGNOSIS — M25.562 CHRONIC PAIN OF BOTH KNEES: ICD-10-CM

## 2022-10-25 DIAGNOSIS — M25.561 CHRONIC PAIN OF BOTH KNEES: ICD-10-CM

## 2022-10-25 PROCEDURE — 99213 OFFICE O/P EST LOW 20 MIN: CPT | Performed by: FAMILY MEDICINE

## 2022-10-27 PROBLEM — E66.01 SEVERE OBESITY (BMI 35.0-39.9) WITH COMORBIDITY (HCC): Status: ACTIVE | Noted: 2019-04-17

## 2022-10-27 RX ORDER — METHOCARBAMOL 750 MG/1
TABLET, FILM COATED ORAL
COMMUNITY
Start: 2022-10-20

## 2022-10-27 RX ORDER — AMITRIPTYLINE HYDROCHLORIDE 10 MG/1
TABLET, FILM COATED ORAL
COMMUNITY
Start: 2022-08-08

## 2022-10-27 RX ORDER — DIPHENHYDRAMINE HYDROCHLORIDE AND LIDOCAINE HYDROCHLORIDE AND ALUMINUM HYDROXIDE AND MAGNESIUM HYDRO
KIT
COMMUNITY
Start: 2022-07-23

## 2022-10-27 RX ORDER — AMITRIPTYLINE HYDROCHLORIDE 10 MG/1
TABLET, FILM COATED ORAL
COMMUNITY
Start: 2022-07-01

## 2022-10-27 NOTE — PROGRESS NOTES
Assessment/Plan:    20-year-old woman with:  Hyperlipidemia impaired fasting glucose severe obesity with comorbidity and bilateral knee pain discussed workup and treatment options with risks and benefits reviewed recent blood work will continue current treatment patient being planned for upcoming knee replacement discussed supportive care return parameters otherwise    No problem-specific Assessment & Plan notes found for this encounter  Diagnoses and all orders for this visit:    Hypercholesterolemia    Impaired fasting glucose    Severe obesity (BMI 35 0-39  9) with comorbidity (HCC)    Chronic pain of both knees    Other orders  -     amitriptyline (ELAVIL) 10 mg tablet  -     amitriptyline (ELAVIL) 10 mg tablet  -     DPH-Lido-AlHydr-MgHydr-Simeth (First-Mouthwash BLM) SUSP; SWISH AND SPIT 10 ML EVERY 4 (FOUR) HOURS AS NEEDED FOR MOUTH PAIN OR DISCOMFORT  -     methocarbamol (ROBAXIN) 750 mg tablet; TAKE 1 TABLET (750 MG TOTAL) BY MOUTH 4 (FOUR) TIMES A DAY AS NEEDED FOR MUSCLE SPASMS  Subjective:     Chief Complaint   Patient presents with   • Follow-up     Discuss lab work    • Blood pressure     Please double check blood pressure         Patient ID: Yoseph Chatman is a 45 y o  female  Patient is a 20-year-old woman who presents for follow-up on recent blood work she has hyperlipidemia impaired fasting glucose severe obesity with comorbidity obesity she admits being generally stable she continues to make lifestyle changes she pees being planned for upcoming knee replacement no fevers chills nausea vomiting other complaints at this time      The following portions of the patient's history were reviewed and updated as appropriate: allergies, current medications, past family history, past medical history, past social history, past surgical history and problem list     Review of Systems   Constitutional: Negative  HENT: Negative  Eyes: Negative  Respiratory: Negative      Cardiovascular: Negative  Gastrointestinal: Negative  Endocrine: Negative  Genitourinary: Negative  Musculoskeletal: Positive for arthralgias  Allergic/Immunologic: Negative  Neurological: Negative  Hematological: Negative  Psychiatric/Behavioral: Negative  All other systems reviewed and are negative  Objective:      /78 (BP Location: Left arm, Patient Position: Sitting, Cuff Size: Standard)   Pulse 105   Temp 99 2 °F (37 3 °C) (Tympanic)   Resp 16   Ht 5' 3" (1 6 m)   Wt 94 3 kg (208 lb)   LMP 01/12/2018 (Exact Date)   SpO2 98%   BMI 36 85 kg/m²          Physical Exam  Constitutional:       Appearance: She is well-developed  HENT:      Head: Atraumatic  Right Ear: External ear normal       Left Ear: External ear normal    Eyes:      Conjunctiva/sclera: Conjunctivae normal       Pupils: Pupils are equal, round, and reactive to light  Cardiovascular:      Rate and Rhythm: Normal rate and regular rhythm  Heart sounds: Normal heart sounds  Pulmonary:      Effort: Pulmonary effort is normal  No respiratory distress  Breath sounds: Normal breath sounds  Abdominal:      General: There is no distension  Palpations: Abdomen is soft  Tenderness: There is no abdominal tenderness  There is no guarding or rebound  Musculoskeletal:         General: Normal range of motion  Cervical back: Normal range of motion  Skin:     General: Skin is warm and dry  Neurological:      Mental Status: She is alert and oriented to person, place, and time  Cranial Nerves: No cranial nerve deficit  Psychiatric:         Behavior: Behavior normal          Thought Content: Thought content normal          Judgment: Judgment normal          BMI Counseling: Body mass index is 36 85 kg/m²  The BMI is above normal  Nutrition recommendations include encouraging healthy choices of fruits and vegetables   Exercise recommendations include moderate physical activity 150 minutes/week  Rationale for BMI follow-up plan is due to patient being overweight or obese  Depression Screening and Follow-up Plan: Patient was screened for depression during today's encounter  They screened negative with a PHQ-2 score of 0

## 2022-11-16 ENCOUNTER — OFFICE VISIT (OUTPATIENT)
Dept: FAMILY MEDICINE CLINIC | Facility: CLINIC | Age: 52
End: 2022-11-16

## 2022-11-16 VITALS
WEIGHT: 206 LBS | BODY MASS INDEX: 36.5 KG/M2 | DIASTOLIC BLOOD PRESSURE: 68 MMHG | SYSTOLIC BLOOD PRESSURE: 100 MMHG | RESPIRATION RATE: 18 BRPM | HEIGHT: 63 IN | OXYGEN SATURATION: 98 % | HEART RATE: 92 BPM | TEMPERATURE: 97.9 F

## 2022-11-16 DIAGNOSIS — M79.7 FIBROMYALGIA: Primary | ICD-10-CM

## 2022-11-16 DIAGNOSIS — M25.561 CHRONIC PAIN OF BOTH KNEES: ICD-10-CM

## 2022-11-16 DIAGNOSIS — R45.86 MOOD SWINGS: ICD-10-CM

## 2022-11-16 DIAGNOSIS — F41.9 ANXIETY: ICD-10-CM

## 2022-11-16 DIAGNOSIS — G89.29 CHRONIC PAIN OF BOTH KNEES: ICD-10-CM

## 2022-11-16 DIAGNOSIS — R23.2 HOT FLASHES: ICD-10-CM

## 2022-11-16 DIAGNOSIS — Z01.818 PREOPERATIVE CLEARANCE: ICD-10-CM

## 2022-11-16 DIAGNOSIS — M25.562 CHRONIC PAIN OF BOTH KNEES: ICD-10-CM

## 2022-11-17 ENCOUNTER — TELEPHONE (OUTPATIENT)
Dept: FAMILY MEDICINE CLINIC | Facility: CLINIC | Age: 52
End: 2022-11-17

## 2022-11-17 NOTE — PROGRESS NOTES
Assessment/Plan:    75-year-old woman with:  Preop clearance for upcoming knee surgery patient may proceed to the OR with acceptable risk discussed supportive care return parameters regarding fibromyalgia anxiety mood swings and hot flashes will refer to gyn discussed supportive care return parameters otherwise    No problem-specific Assessment & Plan notes found for this encounter  Diagnoses and all orders for this visit:    Fibromyalgia  -     Ambulatory Referral to Obstetrics / Gynecology; Future    Anxiety  -     Ambulatory Referral to Obstetrics / Gynecology; Future    Mood swings  -     Ambulatory Referral to Obstetrics / Gynecology; Future    Hot flashes  -     Ambulatory Referral to Obstetrics / Gynecology; Future    Chronic pain of both knees    Preoperative clearance          Subjective:     Chief Complaint   Patient presents with   • Pre-op Exam     Patient is he for pre op clearance for a right knee replacement on 11/21/22  Patient ID: Amber Navarrete is a 45 y o  female  Patient is a 75-year-old woman who presents for preop clearance for upcoming knee surgery she admits good functional capacity she denies cardiopulmonary disease she also has mood swings and anxiety along with hot flashes she would like a referral to evaluation through gyn home on experienced no other complaints at this time no fevers chills nausea vomiting      The following portions of the patient's history were reviewed and updated as appropriate: allergies, current medications, past family history, past medical history, past social history, past surgical history and problem list     Review of Systems   Constitutional: Negative  HENT: Negative  Eyes: Negative  Respiratory: Negative  Cardiovascular: Negative  Gastrointestinal: Negative  Endocrine: Negative  Genitourinary: Negative  Musculoskeletal: Positive for arthralgias and myalgias  Allergic/Immunologic: Negative  Neurological: Negative  Hematological: Negative  Psychiatric/Behavioral: Negative  All other systems reviewed and are negative  Objective:      /68 (BP Location: Right arm, Patient Position: Sitting, Cuff Size: Adult)   Pulse 92   Temp 97 9 °F (36 6 °C) (Tympanic)   Resp 18   Ht 5' 3" (1 6 m)   Wt 93 4 kg (206 lb)   LMP 01/12/2018 (Exact Date)   SpO2 98%   BMI 36 49 kg/m²          Physical Exam  Constitutional:       Appearance: She is well-developed and well-nourished  HENT:      Head: Atraumatic  Right Ear: External ear normal       Left Ear: External ear normal    Eyes:      Extraocular Movements: EOM normal       Conjunctiva/sclera: Conjunctivae normal       Pupils: Pupils are equal, round, and reactive to light  Cardiovascular:      Rate and Rhythm: Normal rate and regular rhythm  Heart sounds: Normal heart sounds  Pulmonary:      Effort: Pulmonary effort is normal  No respiratory distress  Breath sounds: Normal breath sounds  Abdominal:      General: There is no distension  Palpations: Abdomen is soft  Tenderness: There is no abdominal tenderness  There is no guarding or rebound  Musculoskeletal:         General: Normal range of motion  Cervical back: Normal range of motion  Skin:     General: Skin is warm and dry  Neurological:      Mental Status: She is alert and oriented to person, place, and time  Cranial Nerves: No cranial nerve deficit  Psychiatric:         Mood and Affect: Mood and affect normal          Behavior: Behavior normal          Thought Content: Thought content normal          Judgment: Judgment normal          BMI Counseling: Body mass index is 36 49 kg/m²  The BMI is above normal  Nutrition recommendations include encouraging healthy choices of fruits and vegetables  Exercise recommendations include moderate physical activity 150 minutes/week  Rationale for BMI follow-up plan is due to patient being overweight or obese

## 2022-11-17 NOTE — TELEPHONE ENCOUNTER
Please finish your note for pre op clearance so I can send the note to her surgeon at fax number [phone]

## 2023-01-02 ENCOUNTER — OFFICE VISIT (OUTPATIENT)
Dept: URGENT CARE | Age: 53
End: 2023-01-02

## 2023-01-02 VITALS
DIASTOLIC BLOOD PRESSURE: 74 MMHG | TEMPERATURE: 96.8 F | HEART RATE: 74 BPM | SYSTOLIC BLOOD PRESSURE: 118 MMHG | OXYGEN SATURATION: 97 % | RESPIRATION RATE: 18 BRPM

## 2023-01-02 DIAGNOSIS — R05.1 ACUTE COUGH: Primary | ICD-10-CM

## 2023-01-02 NOTE — PROGRESS NOTES
[facility]        NAME: Cassidy German is a 45 y o  female  : 1970    MRN: [id number]  DATE: 2023  TIME: 9:54 AM      Assessment and Plan     Acute cough [R05 1]  1  Acute cough              Patient Instructions     Continue flonase 2 sprays daily  Continue humidifier and nasal saline spray  Mucinex otc for cough and chest congestion  Acetaminophen for pain  Pcp follow-up in 3-5 days  Proceed to the ER if symptoms worsen    Chief Complaint     Chief Complaint   Patient presents with   • Cold Like Symptoms     Patient relates sinus congestion for past two weeks, cough and chest tightness  Feels "pimple" in back of throat  History of Present Illness     Is a 47-year-old female who presents with congestion for 2 weeks  States she is now developing cough and chest tightness/congestion  States last night she had diarrhea but is unsure if it is due to what she ate  Denies nausea or vomiting  States that she is currently undergoing PT for right knee replacement  States that congestion has been improving with nasal saline sprays and Flonase  Review of Systems     Review of Systems   Constitutional: Negative for chills and fever  HENT: Positive for congestion  Respiratory: Positive for cough and chest tightness  Gastrointestinal: Positive for diarrhea (last night)  Negative for nausea and vomiting  All other systems reviewed and are negative          Current Medications       Current Outpatient Medications:   •  atorvastatin (LIPITOR) 10 mg tablet, TAKE 1 TABLET AT BEDTIME, Disp: 90 tablet, Rfl: 3  •  azelastine (ASTELIN) 0 1 % nasal spray, 1 spray into each nostril 2 (two) times a day Use in each nostril as directed, Disp: 1 mL, Rfl: 0  •  B Complex Vitamins (VITAMIN B COMPLEX) TABS, Take by mouth, Disp: , Rfl:   •  Cholecalciferol (VITAMIN D) 2000 units CAPS, Take by mouth, Disp: , Rfl:   •  fluticasone (FLONASE) 50 mcg/act nasal spray, 1 spray into each nostril 2 (two) times a day, Disp: 1 Bottle, Rfl: 0  •  Multiple Vitamin (MULTIVITAMIN ADULT PO), , Disp: , Rfl:   •  pantoprazole (PROTONIX) 40 mg tablet, , Disp: , Rfl:   •  ammonium lactate (LAC-HYDRIN) 12 % cream, APPLY TOPICALLY AS NEEDED FOR DRY SKIN, Disp: 280 g, Rfl: 2  •  Turmeric (QC TUMERIC COMPLEX PO), , Disp: , Rfl:     Current Allergies     Allergies as of 2023 - Reviewed 2023   Allergen Reaction Noted   • Acetazolamide Hives 10/20/2020   • Other Hives 10/20/2020   • Amoxicillin-pot clavulanate  2013   • Iodinated contrast media  2019   • Iothalamate Hives 2012   • Oxycodone GI Intolerance 2022   • Sulfa antibiotics  2013              The following portions of the patient's history were reviewed and updated as appropriate: allergies, current medications, past family history, past medical history, past social history, past surgical history and problem list      Past Medical History:   Diagnosis Date   • Allergic    • Allergic rhinitis    • Anxiety    • Depression    • Eczema    • Fibromyalgia    • GERD (gastroesophageal reflux disease)    • Hyperlipidemia    • Migraine    • Nasal congestion    • Sleep apnea     on CPAP treatment   • Sleep difficulties        Past Surgical History:   Procedure Laterality Date   • CARPAL TUNNEL RELEASE     •  SECTION  1991   • CHOLECYSTECTOMY  2013   • HEMORRHOID SURGERY  1998   • KNEE SURGERY     • MENISCECTOMY     • TUBAL LIGATION     • WISDOM TOOTH EXTRACTION         Family History   Problem Relation Age of Onset   • Alzheimer's disease Father    • Diabetes Father    • Leukemia Mother          Medications have been verified  Objective     /74   Pulse 74   Temp (!) 96 8 °F (36 °C)   Resp 18   LMP 2018 (Exact Date)   SpO2 97%   Patient's last menstrual period was 2018 (exact date)  Physical Exam     Physical Exam  Vitals and nursing note reviewed     Constitutional:       General: She is awake  She is not in acute distress  Appearance: Normal appearance  She is not ill-appearing, toxic-appearing or diaphoretic  HENT:      Right Ear: Tympanic membrane, ear canal and external ear normal       Left Ear: Tympanic membrane, ear canal and external ear normal       Nose: Congestion present  Mouth/Throat:      Lips: Pink  Mouth: Mucous membranes are moist       Pharynx: Oropharynx is clear  Uvula midline  No oropharyngeal exudate or posterior oropharyngeal erythema  Cardiovascular:      Rate and Rhythm: Normal rate  Pulses: Normal pulses  Heart sounds: Normal heart sounds, S1 normal and S2 normal    Pulmonary:      Effort: Pulmonary effort is normal  No tachypnea  Breath sounds: Normal breath sounds and air entry  No stridor, decreased air movement or transmitted upper airway sounds  No decreased breath sounds, wheezing, rhonchi or rales  Musculoskeletal:      Cervical back: Neck supple  Lymphadenopathy:      Cervical: Cervical adenopathy present  Skin:     General: Skin is warm  Capillary Refill: Capillary refill takes less than 2 seconds  Neurological:      Mental Status: She is alert  Psychiatric:         Mood and Affect: Mood normal          Behavior: Behavior normal          Thought Content:  Thought content normal          Judgment: Judgment normal

## 2023-01-02 NOTE — PATIENT INSTRUCTIONS
Continue flonase 2 sprays daily  Continue humidifier and nasal saline spray  Mucinex otc for cough and chest congestion  Acetaminophen for pain    Pcp follow-up in 3-5 days  Proceed to the ER if symptoms worsen

## 2023-02-16 ENCOUNTER — TELEMEDICINE (OUTPATIENT)
Dept: OBGYN CLINIC | Facility: CLINIC | Age: 53
End: 2023-02-16

## 2023-02-16 DIAGNOSIS — R45.86 MOOD SWINGS: ICD-10-CM

## 2023-02-16 DIAGNOSIS — M79.7 FIBROMYALGIA: ICD-10-CM

## 2023-02-16 DIAGNOSIS — N95.1 MENOPAUSAL SYMPTOMS: Primary | ICD-10-CM

## 2023-02-16 DIAGNOSIS — F41.9 ANXIETY: ICD-10-CM

## 2023-02-16 NOTE — PROGRESS NOTES
Virtual Regular Visit    Verification of patient location:    Patient is located in the following state in which I hold an active license [place]      Assessment/Plan:    Problem List Items Addressed This Visit        Cardiovascular and Mediastinum    Hot flashes - Primary       Other    Anxiety    Fibromyalgia    Relevant Orders    Testosterone    DHEA-sulfate    Mood swings     1) This was a lengthy visit spent discussing the HPATG (hypothalamus/pituitary/adrenal/thyroid/gonadal) axis and the impact that hormonal deviation in one gland can have on another  It is not uncommon for ovarian declined to coincide or invoke thyroid and adrenal dysfunction as well  2) She is correct, hormonal derangements can affect chronic pain and chronic fatigue  Thyroid axis recently tested along with antibodies and normal   3) I recommend we evaluate adrenal function with total testosterone, DHEAS and 24 hour salivary cortisol testing through [facility]  She has many stressors that can lead to adrenal hyperfunction  She agrees  4) I reassured her that she is STILL a candidate for hormone replacement, just not oral estradiol  Progesterone, DHEAS, testosterone can all be used and does not impact VTE risk! Will discuss these possibilities after labs reviewed  5) Chronic pain in face of no injury or obvious physical abnormality also points to systemic inflammation, as does GERD  This often starts in the gut  Her dietary choices have been poor ( partly due to mood), but I recommend a Paleo food plan approach  She has already made impactful changes, will tweak further  Other options for pain relief in the supplement space include CBD, benson, Boswelia, tryptophan, etc  We will discuss these options further at follow up  Acupuncture would be a huge adjunct as well  7) Follow up after lab tests complete to review      This was a 60 minute visit with greater than 50% of time spent in face to face counseling and coordination of care Reason for visit is   Chief Complaint   Patient presents with   • Virtual Regular Visit        Encounter provider Ran Hernandez MD    Provider located at OB/GYN [facility] [address]  [address] [place] [address]  [phone]      Recent Visits  No visits were found meeting these conditions  Showing recent visits within past 7 days and meeting all other requirements  Future Appointments  No visits were found meeting these conditions  Showing future appointments within next 150 days and meeting all other requirements       The patient was identified by name and date of birth  Tico Peterson was informed that this is a telemedicine visit and that the visit is being conducted through the [facility]  She agrees to proceed     My office door was closed  No one else was in the room  She acknowledged consent and understanding of privacy and security of the video platform  The patient has agreed to participate and understands they can discontinue the visit at any time  Patient is aware this is a billable service  Acacia Drake presents for hormonal consult, her first visit with me; referred by Dr Tono Fowler, sees [facility] for gyn care  Jessica's story begins in 2013 with onset of panic attacks, hospitalized with chest pain, eventually dismissed  Started on "low dose HRT" by [facility]  [id number], and diagnosed with multiple PE 4 months later  Since then, she complains of :  1) Night sweats  Has tried [place] OTC without effect  She notes she read "horror stories about synthetic medications" and is hesitant to use these  2) Weight gain, but recently has overhauled her diet to lose weight before knee replacement  She had quite a sweet tooth, and has eliminated sugary foods, pastries, carbs  She  Admits her GERD has improved from this as well  Mild weight loss    3) Extraordinary pain between her shoulder blades, assorted myalgias  A holistic doctor diagnosed her with fibromyalgia, and has tried many medications for this with minimal effect  She has tried PT no help Hesitant to take anti inflammatories  Elavil was just weaned off a couple months ago  Arthritis creams somewhat helpful  She believes it is related to menopause as this is when symptoms started  4) Feeling of overwhelming dread  [place]  PMHX: as above; hx of GERD, NAFLD, sleep apnea now with CPAP machine  FHX: Mom  leukemia age 64; MGM gastric Ca, MGF COPD  Dad with dementia COD age [de-identified]; PGM  80  2 siblings, healthy   2 daughters , eldest with epilepsy       Past Medical History:   Diagnosis Date   • Allergic    • Allergic rhinitis    • Anxiety    • Depression    • Eczema    • Fibromyalgia    • GERD (gastroesophageal reflux disease)    • Hyperlipidemia    • Migraine    • Nasal congestion    • Sleep apnea     on CPAP treatment   • Sleep difficulties        Past Surgical History:   Procedure Laterality Date   • CARPAL TUNNEL RELEASE     •  SECTION  1991   • CHOLECYSTECTOMY  2013   • HEMORRHOID SURGERY  1998   • KNEE SURGERY     • MENISCECTOMY     • TUBAL LIGATION     • WISDOM TOOTH EXTRACTION         Current Outpatient Medications   Medication Sig Dispense Refill   • ammonium lactate (LAC-HYDRIN) 12 % cream APPLY TOPICALLY AS NEEDED FOR DRY SKIN 385 g 0   • atorvastatin (LIPITOR) 10 mg tablet TAKE 1 TABLET AT BEDTIME 90 tablet 3   • azelastine (ASTELIN) 0 1 % nasal spray 1 spray into each nostril 2 (two) times a day Use in each nostril as directed 1 mL 0   • B Complex Vitamins (VITAMIN B COMPLEX) TABS Take by mouth     • Cholecalciferol (VITAMIN D) 2000 units CAPS Take by mouth     • fluticasone (FLONASE) 50 mcg/act nasal spray 1 spray into each nostril 2 (two) times a day 1 Bottle 0   • Multiple Vitamin (MULTIVITAMIN ADULT PO)      • pantoprazole (PROTONIX) 40 mg tablet      • Turmeric (QC TUMERIC COMPLEX PO)        No current facility-administered medications for this visit  Allergies   Allergen Reactions   • Acetazolamide Hives   • Other Hives   • Amoxicillin-Pot Clavulanate    • Iodinated Contrast Media      Needs to be prepped for IVC   • Iothalamate Hives     Contrast dye   • Oxycodone GI Intolerance   • Sulfa Antibiotics        Review of Systems   Constitutional: Positive for appetite change, fatigue and unexpected weight change  Wieght gain, but recent intentional weight loss with diet change   Cardiovascular: Positive for palpitations  Gastrointestinal:        Reflux, see HPI   Endocrine: Positive for heat intolerance  Genitourinary: Negative  Musculoskeletal: Positive for back pain and myalgias  Neurological: Negative  Psychiatric/Behavioral: Positive for dysphoric mood and sleep disturbance  The patient is nervous/anxious  Video Exam    There were no vitals filed for this visit      Physical Exam

## 2023-02-27 ENCOUNTER — TELEPHONE (OUTPATIENT)
Dept: OBGYN CLINIC | Facility: CLINIC | Age: 53
End: 2023-02-27

## 2023-02-27 NOTE — TELEPHONE ENCOUNTER
Patient needs [facility] to register for her Saliva test - ID number provided by patient is - [id number] - patient is afraid test will be thrown out if it is not registered by [facility] on time

## 2023-03-10 ENCOUNTER — OFFICE VISIT (OUTPATIENT)
Dept: FAMILY MEDICINE CLINIC | Facility: CLINIC | Age: 53
End: 2023-03-10

## 2023-03-10 VITALS
OXYGEN SATURATION: 99 % | DIASTOLIC BLOOD PRESSURE: 84 MMHG | HEIGHT: 63 IN | WEIGHT: 196.6 LBS | SYSTOLIC BLOOD PRESSURE: 118 MMHG | TEMPERATURE: 97.6 F | BODY MASS INDEX: 34.84 KG/M2 | HEART RATE: 90 BPM

## 2023-03-10 DIAGNOSIS — F41.9 ANXIETY: Primary | ICD-10-CM

## 2023-03-10 RX ORDER — ALPRAZOLAM 0.25 MG/1
0.25 TABLET ORAL 2 TIMES DAILY PRN
Qty: 30 TABLET | Refills: 1 | Status: SHIPPED | OUTPATIENT
Start: 2023-03-10

## 2023-03-10 RX ORDER — ONDANSETRON 4 MG/1
4 TABLET, FILM COATED ORAL
COMMUNITY
Start: 2023-02-13

## 2023-03-13 NOTE — PROGRESS NOTES
Assessment/Plan:    61-year-old woman with: Anxiety BP and BG reviewed and medications were refilled discussed working return parameters at length encouraged follow-up with her specialist discussed working return parameters otherwise    No problem-specific Assessment & Plan notes found for this encounter  Diagnoses and all orders for this visit:    Anxiety  -     ALPRAZolam (XANAX) 0 25 mg tablet; Take 1 tablet (0 25 mg total) by mouth 2 (two) times a day as needed for anxiety    Other orders  -     ondansetron (ZOFRAN) 4 mg tablet; Take 4 mg by mouth  -     Coenzyme Q10 10 MG capsule; Take 10 mg by mouth daily          Subjective:     Chief Complaint   Patient presents with   • Weight Loss     Patient states she has lost 18lbs since 10/2022  Patient states she has no appetite and is not eating  • Headache     Patient states she has had a headache for the past three weeks  • Depression     Patient states she is feeling depressed  Patient ID: Rodoflo Schwartz is a 45 y o  female  Patient is a 61-year-old woman who presents for follow-up on anxiety she admits that she has had some stress of late she is getting work-up from gynecology to discuss her hormonal imbalance no fevers chills nausea vomiting no other complaints acutely    Headache  Depression  Associated symptoms include headaches  The following portions of the patient's history were reviewed and updated as appropriate: allergies, current medications, past family history, past medical history, past social history, past surgical history and problem list     Review of Systems   Constitutional: Negative  HENT: Negative  Eyes: Negative  Respiratory: Negative  Cardiovascular: Negative  Gastrointestinal: Negative  Endocrine: Negative  Genitourinary: Negative  Musculoskeletal: Negative  Allergic/Immunologic: Negative  Neurological: Positive for headaches  Hematological: Negative      Psychiatric/Behavioral: Positive for depression  The patient is nervous/anxious  All other systems reviewed and are negative  Objective:      /84 (BP Location: Right arm, Patient Position: Sitting, Cuff Size: Standard)   Pulse 90   Temp 97 6 °F (36 4 °C) (Tympanic)   Ht 5' 3" (1 6 m)   Wt 89 2 kg (196 lb 9 6 oz)   LMP 01/12/2018 (Exact Date)   SpO2 99%   BMI 34 83 kg/m²          Physical Exam  Constitutional:       Appearance: She is well-developed  HENT:      Head: Atraumatic  Right Ear: External ear normal       Left Ear: External ear normal    Eyes:      Conjunctiva/sclera: Conjunctivae normal       Pupils: Pupils are equal, round, and reactive to light  Cardiovascular:      Rate and Rhythm: Normal rate and regular rhythm  Heart sounds: Normal heart sounds  Pulmonary:      Effort: Pulmonary effort is normal  No respiratory distress  Breath sounds: Normal breath sounds  Abdominal:      General: There is no distension  Palpations: Abdomen is soft  Tenderness: There is no abdominal tenderness  There is no guarding or rebound  Musculoskeletal:         General: Normal range of motion  Cervical back: Normal range of motion  Skin:     General: Skin is warm and dry  Neurological:      Mental Status: She is alert and oriented to person, place, and time  Cranial Nerves: No cranial nerve deficit  Psychiatric:         Behavior: Behavior normal          Thought Content: Thought content normal          Judgment: Judgment normal        BMI Counseling: Body mass index is 34 83 kg/m²  The BMI is above normal  Nutrition recommendations include encouraging healthy choices of fruits and vegetables  Exercise recommendations include moderate physical activity 150 minutes/week  Rationale for BMI follow-up plan is due to patient being overweight or obese

## 2023-05-10 ENCOUNTER — TELEPHONE (OUTPATIENT)
Dept: OBGYN CLINIC | Facility: CLINIC | Age: 53
End: 2023-05-10

## 2023-05-10 DIAGNOSIS — N95.1 MENOPAUSAL SYMPTOMS: Primary | ICD-10-CM

## 2023-05-10 RX ORDER — PROGESTERONE 200 MG/1
200 CAPSULE ORAL
Qty: 30 CAPSULE | Refills: 11 | Status: SHIPPED | OUTPATIENT
Start: 2023-05-10

## 2023-05-11 DIAGNOSIS — L70.8 OTHER ACNE: Primary | ICD-10-CM

## 2023-05-11 RX ORDER — SPIRONOLACTONE 50 MG/1
50 TABLET, FILM COATED ORAL DAILY
Qty: 30 TABLET | Refills: 11 | Status: SHIPPED | OUTPATIENT
Start: 2023-05-11

## 2023-05-24 ENCOUNTER — OFFICE VISIT (OUTPATIENT)
Dept: FAMILY MEDICINE CLINIC | Facility: CLINIC | Age: 53
End: 2023-05-24

## 2023-05-24 VITALS
HEIGHT: 63 IN | WEIGHT: 203.8 LBS | SYSTOLIC BLOOD PRESSURE: 114 MMHG | HEART RATE: 92 BPM | DIASTOLIC BLOOD PRESSURE: 70 MMHG | OXYGEN SATURATION: 98 % | TEMPERATURE: 96.8 F | BODY MASS INDEX: 36.11 KG/M2

## 2023-05-24 DIAGNOSIS — Z00.00 ROUTINE ADULT HEALTH MAINTENANCE: ICD-10-CM

## 2023-05-24 DIAGNOSIS — E66.01 SEVERE OBESITY (BMI 35.0-39.9) WITH COMORBIDITY (HCC): ICD-10-CM

## 2023-05-24 DIAGNOSIS — M79.7 FIBROMYALGIA: Primary | ICD-10-CM

## 2023-05-24 RX ORDER — CELECOXIB 200 MG/1
200 CAPSULE ORAL DAILY PRN
Qty: 60 CAPSULE | Refills: 1 | Status: SHIPPED | OUTPATIENT
Start: 2023-05-24

## 2023-05-26 PROBLEM — Z00.00 ROUTINE ADULT HEALTH MAINTENANCE: Status: ACTIVE | Noted: 2023-05-26

## 2023-05-26 NOTE — PROGRESS NOTES
Assessment/Plan:    44 y/o woman with: fibromyalgia and severe obesity with comorbidity along with annual well visit  Will continue current meds  And add celebrex  Will continue follow-up with specialists, Discussed supportive care and return parameters  Regarding Annual well visit, discussed various safety and health maintenance issues including healthy diet like the Mediterranean diet, exercise, ample sleep, stress reduction, and healthy weight as tolerated  Discussed supportive care and return parameters  No problem-specific Assessment & Plan notes found for this encounter  Diagnoses and all orders for this visit:    Fibromyalgia  -     celecoxib (CeleBREX) 200 mg capsule; Take 1 capsule (200 mg total) by mouth daily as needed for moderate pain    Routine adult health maintenance    Severe obesity (BMI 35 0-39  9) with comorbidity [facility])          Subjective:     Chief Complaint   Patient presents with   • Well Check     Annual physical and c/o back pain and shoulder pain  No further concerns, ng  Patient states she is scheduled for mammogram 9/21/2023 with [facility]  Patient ID: Jaye Hughes is a 45 y o  female  Patient is a 44 y/o woman who presents for follow-up on fibromyalgia and severe obesity with comorbidity  Patient admits some breakthrough pain and hot flashes and denies other acute complaints no fevers chills nausea or vomiting  Patient is also here for an annual well visit she admits being physically active, eats and sleeps well no other health maintenance issues  The following portions of the patient's history were reviewed and updated as appropriate: allergies, current medications, past family history, past medical history, past social history, past surgical history and problem list     Review of Systems   Constitutional: Negative  HENT: Negative  Eyes: Negative  Respiratory: Negative  Cardiovascular: Negative  Gastrointestinal: Negative  Endocrine: Negative  "  Genitourinary: Negative  Musculoskeletal: Positive for arthralgias and myalgias  Allergic/Immunologic: Negative  Neurological: Negative  Hematological: Negative  Psychiatric/Behavioral: Negative  All other systems reviewed and are negative  Objective:      /70 (BP Location: Right arm, Patient Position: Sitting, Cuff Size: Large)   Pulse 92   Temp (!) 96 8 °F (36 °C) (Tympanic)   Ht 5' 3\" (1 6 m)   Wt 92 4 kg (203 lb 12 8 oz)   LMP 01/12/2018 (Exact Date)   SpO2 98%   BMI 36 10 kg/m²          Physical Exam  Constitutional:       Appearance: She is well-developed  HENT:      Head: Atraumatic  Right Ear: External ear normal       Left Ear: External ear normal    Eyes:      Conjunctiva/sclera: Conjunctivae normal       Pupils: Pupils are equal, round, and reactive to light  Cardiovascular:      Rate and Rhythm: Normal rate and regular rhythm  Heart sounds: Normal heart sounds  Pulmonary:      Effort: Pulmonary effort is normal  No respiratory distress  Breath sounds: Normal breath sounds  Abdominal:      General: There is no distension  Palpations: Abdomen is soft  Tenderness: There is no abdominal tenderness  There is no guarding or rebound  Musculoskeletal:         General: Normal range of motion  Cervical back: Normal range of motion  Skin:     General: Skin is warm and dry  Neurological:      Mental Status: She is alert and oriented to person, place, and time  Cranial Nerves: No cranial nerve deficit  Psychiatric:         Behavior: Behavior normal          Thought Content:  Thought content normal          Judgment: Judgment normal          "

## 2023-05-30 ENCOUNTER — TELEPHONE (OUTPATIENT)
Dept: FAMILY MEDICINE CLINIC | Facility: CLINIC | Age: 53
End: 2023-05-30

## 2023-05-30 DIAGNOSIS — R00.2 PALPITATIONS: Primary | ICD-10-CM

## 2023-05-30 NOTE — TELEPHONE ENCOUNTER
----- Message from Sravan Hinton sent at 5/30/2023 12:40 PM EDT -----  Regarding: FW: Heart monitor   Contact: [phone]    ----- Message -----  From: Delia Favre  Sent: 5/30/2023  12:23 PM EDT  To: [facility] [place] [facility]  Subject: Heart monitor                                    Dr Russell Mckoy,    We discussed a heart monitor for me on my last visit and I would like to move forward with that  Unless you suggest something else  I am still having the heart palpitations and as you suggested it could be hormonal  I thought we could start with the monitor and then go from there

## 2023-06-01 DIAGNOSIS — N95.1 MENOPAUSAL SYMPTOMS: Primary | ICD-10-CM

## 2023-06-01 RX ORDER — PROGESTERONE 100 MG/1
100 CAPSULE ORAL
Qty: 30 CAPSULE | Refills: 11 | Status: SHIPPED | OUTPATIENT
Start: 2023-06-01 | End: 2023-06-08 | Stop reason: SDUPTHER

## 2023-06-08 DIAGNOSIS — N95.1 MENOPAUSAL SYMPTOMS: ICD-10-CM

## 2023-06-08 RX ORDER — PROGESTERONE 100 MG/1
100 CAPSULE ORAL
Qty: 90 CAPSULE | Refills: 2 | Status: SHIPPED | OUTPATIENT
Start: 2023-06-08

## 2023-07-25 PROBLEM — Z00.00 ROUTINE ADULT HEALTH MAINTENANCE: Status: RESOLVED | Noted: 2023-05-26 | Resolved: 2023-07-25

## 2023-08-01 ENCOUNTER — TELEPHONE (OUTPATIENT)
Dept: FAMILY MEDICINE CLINIC | Facility: CLINIC | Age: 53
End: 2023-08-01

## 2023-08-01 NOTE — TELEPHONE ENCOUNTER
Lonni Krabbe from [facility] podiatry called and states patient an insurance referral. Patient is scheduled for Thursday 8/3/2023 for left foot acelius tendon pain. Patient is scheduled with Dr Layo Quiñonez. [facility] podiatry   Spoke with Lonni Krabbe.   Phone [phone]    Fax [phone]

## 2023-08-31 ENCOUNTER — TELEMEDICINE (OUTPATIENT)
Dept: OBGYN CLINIC | Facility: CLINIC | Age: 53
End: 2023-08-31
Payer: OTHER GOVERNMENT

## 2023-08-31 DIAGNOSIS — R53.82 CHRONIC FATIGUE: ICD-10-CM

## 2023-08-31 DIAGNOSIS — N95.1 MENOPAUSAL SYMPTOMS: Primary | ICD-10-CM

## 2023-08-31 DIAGNOSIS — F41.9 ANXIETY: ICD-10-CM

## 2023-08-31 PROCEDURE — 99214 OFFICE O/P EST MOD 30 MIN: CPT | Performed by: OBSTETRICS & GYNECOLOGY

## 2023-08-31 NOTE — PROGRESS NOTES
Virtual Regular Visit    Verification of patient location:    Patient is located at Other in the following state in which I hold an active license [place]      Assessment/Plan:    Problem List Items Addressed This Visit        Other    Anxiety   Other Visit Diagnoses     Menopausal symptoms    -  Primary    Chronic fatigue            1) I suspect her anxiety event with PG increase was not due to progesterone but due to other stressors affecting adrenal function. I propose to increase PG to 200mg again. 2) Apply testosterone cream in morning instead of night, may also help with sleep. 3) Email with progress report in 1 month  4) Discussed herbals, I really like christina yaritza, or other herbals, may be used in conjunction with HRT. Black cohosh is fine, I like Estroven. 5) Follow up prn. This was a 30 minute visit with greater than 50% of time spent in face to face counseling and coordination of care       Reason for visit is   Chief Complaint   Patient presents with   • Virtual Regular Visit        Encounter provider Christiana Gayle MD    Provider located at [facility]  [phone]      Recent Visits  No visits were found meeting these conditions. Showing recent visits within past 7 days and meeting all other requirements  Future Appointments  No visits were found meeting these conditions. Showing future appointments within next 150 days and meeting all other requirements       The patient was identified by name and date of birth. Nayana Sadie was informed that this is a telemedicine visit and that the visit is being conducted through the [employer]. She agrees to proceed. .  My office door was closed. No one else was in the room. She acknowledged consent and understanding of privacy and security of the video platform.  The patient has agreed to participate and understands they can discontinue the visit at any time. Patient is aware this is a billable service. Marisabel Hyde presents for hormone consult follow up. I saw her 4 months ago with typical menopausal symptoms of hot flash, sleeping disturbance, anxiety and mood disturbance. She has history of DVT on estradiol, so progesterone 100mg oral and testosterone cream 2 gm/d issued. When hot flashes persisted, PG increased to 200 mg. She then thought this made her anxiety worse, so went back down to 100mg PG, added Cortisol Manager adrenal adaptagen to mix. When anxiety persisted, stopped Cortisol Manager, replaced with Adrenotone, and encouraged to make this follow up appt. She is happy to report that she feels so much better on this new adrenal adaptagen! " I haven't felt this good in years". Note more motivation as well. Still reports premature awakening at 0300, still feels hot. Inquires about black cohosh? NO other changes in health status.         Past Medical History:   Diagnosis Date   • Allergic rhinitis    • Anxiety    • Depression    • Eczema    • Endometriosis    • Fibromyalgia    • GERD (gastroesophageal reflux disease)    • Gestational diabetes    • Herpes    • Hyperlipidemia    • Migraine    • Nasal congestion    • Pulmonary embolism ([address]) 2015    Was on hormone replacement therapy   • Sleep apnea     on CPAP treatment   • Sleep difficulties        Past Surgical History:   Procedure Laterality Date   • BREAST BIOPSY     • CARPAL TUNNEL RELEASE     •  SECTION  1991   • CHOLECYSTECTOMY  2013   • HEMORRHOID SURGERY  1998   • KNEE SURGERY     • MENISCECTOMY     • TUBAL LIGATION     • WISDOM TOOTH EXTRACTION         Current Outpatient Medications   Medication Sig Dispense Refill   • ammonium lactate (LAC-HYDRIN) 12 % cream APPLY TOPICALLY AS NEEDED FOR DRY SKIN 385 g 0   • atorvastatin (LIPITOR) 10 mg tablet TAKE 1 TABLET AT BEDTIME 90 tablet 3   • azelastine (ASTELIN) 0.1 % nasal spray 1 spray into each nostril 2 (two) times a day Use in each nostril as directed 1 mL 0   • B Complex Vitamins (VITAMIN B COMPLEX) TABS Take by mouth     • Cholecalciferol (VITAMIN D) 2000 units CAPS Take by mouth     • Coenzyme Q10 10 MG capsule Take 10 mg by mouth daily     • fluticasone (FLONASE) 50 mcg/act nasal spray 1 spray into each nostril 2 (two) times a day 1 Bottle 0   • Multiple Vitamin (MULTIVITAMIN ADULT PO)      • ondansetron (ZOFRAN) 4 mg tablet Take 4 mg by mouth     • other medication, see sig, Medication/product name: testosterone cream  Strength: 4mg/ml  Sig (include dose, route, frequency): apply 0.5 ml ( 2 mg) to labia daily 15 mg 5   • pantoprazole (PROTONIX) 40 mg tablet      • Progesterone 100 MG CAPS Take 100 mg by mouth at bedtime 90 capsule 2   • spironolactone (ALDACTONE) 50 mg tablet Take 1 tablet (50 mg total) by mouth daily 30 tablet 11   • Turmeric (QC TUMERIC COMPLEX PO)        No current facility-administered medications for this visit. Allergies   Allergen Reactions   • Acetazolamide Hives   • Other Hives   • Amoxicillin-Pot Clavulanate    • Iodinated Contrast Media      Needs to be prepped for IVC   • Iothalamate Hives     Contrast dye   • Oxycodone GI Intolerance   • Sulfa Antibiotics        Review of Systems   Constitutional: Negative. Endocrine: Positive for heat intolerance. Genitourinary: Negative. Musculoskeletal: Negative. Neurological: Negative. Psychiatric/Behavioral: Positive for sleep disturbance. Negative for decreased concentration and dysphoric mood. The patient is not nervous/anxious. Video Exam    There were no vitals filed for this visit.     Physical Exam     Visit Time

## 2023-09-22 DIAGNOSIS — Z12.31 SCREENING MAMMOGRAM FOR BREAST CANCER: ICD-10-CM

## 2023-09-26 NOTE — PROGRESS NOTES
Cardiology Office Note  MD Manisha Callahan MD, Hina Ralph DO, MD Judge Venice Cárdenas DO, Srini Marcelino DO, [facility]  ----------------------------------------------------------------  [address]  [address] [facility], [address] [facility]    Sav Carrion 52 y.o. female MRN: [id number]  Unit/Bed#:  Encounter: [id number]      History of Present Illness: It was a pleasure to see Sav Carrion in the office today for initial CV evaluation. She has a past medical history of dyslipidemia, LOLY on CPAP, GERD, anxiety and depression. Aside from a pacemaker, patient denies family history of coronary artery disease or sudden cardiac death. She established care with us in September 2023. The patient had been experiencing episodes of shortness of breath with physical activity dating back to 2018. Her symptoms have been fairly constant progressing through September 2023. She noted that when she does significant activity especially pushing hard through multiple flights of stairs, she did feel somewhat short of breath. Additionally, she had been having palpitations since her 45s. Her palpitations somewhat worsened while taking estrogen therapy and improved when she was on progesterone in the summer 2023. There was no associated lightheadedness or loss of consciousness. Denies lower extremity swelling orthopnea or paroxysmal nocturnal dyspnea. Review of Systems:  Review of Systems   Constitutional: Negative for decreased appetite, fever, weight gain and weight loss. HENT: Negative for congestion and sore throat. Eyes: Negative for visual disturbance. Cardiovascular: Positive for dyspnea on exertion and palpitations. Negative for chest pain, leg swelling and near-syncope. Respiratory: Positive for shortness of breath. Negative for cough. Hematologic/Lymphatic: Negative for bleeding problem. Skin: Negative for rash.    Musculoskeletal: Negative for myalgias and neck pain. Gastrointestinal: Negative for abdominal pain and nausea. Neurological: Negative for light-headedness and weakness. Psychiatric/Behavioral: Negative for depression.        Past Medical History:   Diagnosis Date   • Allergic rhinitis    • Anxiety    • Depression    • Eczema    • Endometriosis    • Fibromyalgia    • GERD (gastroesophageal reflux disease)    • Gestational diabetes    • Herpes    • Hyperlipidemia    • Migraine    • Nasal congestion    • Pulmonary embolism ([address]) 2015    Was on hormone replacement therapy   • Sleep apnea     on CPAP treatment   • Sleep difficulties        Past Surgical History:   Procedure Laterality Date   • BREAST BIOPSY     • CARPAL TUNNEL RELEASE     •  SECTION  1991   • CHOLECYSTECTOMY  2013   • HEMORRHOID SURGERY  1998   • KNEE SURGERY     • MENISCECTOMY     • TUBAL LIGATION     • WISDOM TOOTH EXTRACTION         Social History     Socioeconomic History   • Marital status: /Civil Union     Spouse name: Not on file   • Number of children: Not on file   • Years of education: Not on file   • Highest education level: Not on file   Occupational History   • Not on file   Tobacco Use   • Smoking status: Never   • Smokeless tobacco: Never   Vaping Use   • Vaping Use: Never used   Substance and Sexual Activity   • Alcohol use: No     Comment: SOCIAL DRINKER   • Drug use: No   • Sexual activity: Not Currently     Partners: Male     Birth control/protection: Abstinence     Comment:  is not interested   Other Topics Concern   • Not on file   Social History Narrative    5 cats     Social Determinants of Health     Financial Resource Strain: Not on file   Food Insecurity: Not on file   Transportation Needs: Not on file   Physical Activity: Not on file   Stress: Not on file   Social Connections: Not on file   Intimate Partner Violence: Not on file   Housing Stability: Not on file       Family History   Problem Relation Age of Onset   • Alzheimer's disease Father    • Diabetes Father    • Leukemia Mother    • Cancer Mother         Leukemia       Allergies   Allergen Reactions   • Acetazolamide Hives   • Other Hives   • Amoxicillin-Pot Clavulanate    • Iodinated Contrast Media      Needs to be prepped for IVC   • Iothalamate Hives     Contrast dye   • Oxycodone GI Intolerance   • Sulfa Antibiotics          Current Outpatient Medications:   •  ammonium lactate (LAC-HYDRIN) 12 % cream, APPLY TOPICALLY AS NEEDED FOR DRY SKIN, Disp: 385 g, Rfl: 0  •  azelastine (ASTELIN) 0.1 % nasal spray, 1 spray into each nostril 2 (two) times a day Use in each nostril as directed, Disp: 1 mL, Rfl: 0  •  B Complex Vitamins (VITAMIN B COMPLEX) TABS, Take by mouth, Disp: , Rfl:   •  Cholecalciferol (VITAMIN D) 2000 units CAPS, Take by mouth, Disp: , Rfl:   •  Coenzyme Q10 10 MG capsule, Take 10 mg by mouth daily, Disp: , Rfl:   •  fluticasone (FLONASE) 50 mcg/act nasal spray, 1 spray into each nostril 2 (two) times a day, Disp: 1 Bottle, Rfl: 0  •  Multiple Vitamin (MULTIVITAMIN ADULT PO), , Disp: , Rfl:   •  other medication, see sig,, Medication/product name: testosterone cream Strength: 4mg/ml Sig (include dose, route, frequency): apply 0.5 ml ( 2 mg) to labia daily, Disp: 15 mg, Rfl: 5  •  Progesterone 100 MG CAPS, Take 100 mg by mouth at bedtime, Disp: 90 capsule, Rfl: 2  •  Turmeric (QC TUMERIC COMPLEX PO), , Disp: , Rfl:   •  atorvastatin (LIPITOR) 10 mg tablet, TAKE 1 TABLET AT BEDTIME, Disp: 90 tablet, Rfl: 3  •  ondansetron (ZOFRAN) 4 mg tablet, Take 4 mg by mouth, Disp: , Rfl:   •  pantoprazole (PROTONIX) 40 mg tablet, , Disp: , Rfl:     Vitals:    09/27/23 0854   BP: 106/70   BP Location: Right arm   Patient Position: Sitting   Cuff Size: Large   Pulse: 79   Weight: 93.9 kg (207 lb)   Height: 5' 3" (1.6 m)     Body mass index is 36.67 kg/m².     PHYSICAL EXAMINATION:  Gen: Awake, Alert, NAD   Head/eyes: AT/NC, pupils equal and round, Anicteric  ENT: mmm  Neck: Supple, No elevated JVP, trachea midline  Resp: CTA bilaterally no w/r/r  CV: RRR +S1, S2, No m/r/g  Abd: Soft, obese, NT/ND + BS  Ext: no LE edema bilaterally  Neuro: Follows commands, moves all extermities  Psych: Appropriate affect, normal mood, pleasant attitude, non-combative  Skin: warm; no rash, erythema or venous stasis changes on exposed skin    --------------------------------------------------------------------------------  TREADMILL STRESS  No results found for this or any previous visit.     --------------------------------------------------------------------------------  NUCLEAR STRESS TEST: No results found for this or any previous visit. No results found for this or any previous visit.      --------------------------------------------------------------------------------  CATH:  No results found for this or any previous visit.    --------------------------------------------------------------------------------  ECHO:   No results found for this or any previous visit. No results found for this or any previous visit.    --------------------------------------------------------------------------------  HOLTER  No results found for this or any previous visit.     No results found for this or any previous visit.    --------------------------------------------------------------------------------  CAROTIDS  No results found for this or any previous visit.     --------------------------------------------------------------------------------  ECGs:  Results for orders placed or performed in visit on 09/27/23   POCT ECG    Impression    Sinus rhythm 79 bpm with nonspecific ST-T wave abnormalities        Lab Results   Component Value Date    WBC 11.45 (H) 09/21/2021    HGB 13.7 09/21/2021    HCT 44.8 09/21/2021    MCV 92 09/21/2021     09/21/2021      Lab Results   Component Value Date    SODIUM 141 09/21/2021    K 3.9 09/21/2021     09/21/2021    CO2 26 09/21/2021 BUN 15 09/21/2021    CREATININE 0.91 09/21/2021    GLUC 149 (H) 09/21/2021    CALCIUM 8.8 09/21/2021      Lab Results   Component Value Date    HGBA1C 6.1 (H) 10/19/2022      Lab Results   Component Value Date    CHOL 225 (H) 08/22/2013     Lab Results   Component Value Date    HDL 51 08/22/2013     No results found for: "[facility]"  Lab Results   Component Value Date    TRIG 144 08/22/2013     No results found for: "CHOLHDL"   Lab Results   Component Value Date    INR 0.97 12/30/2020    INR 0.85 11/21/2019    PROTIME 12.7 12/30/2020    PROTIME 11.7 11/21/2019        1. Palpitations  -     Ambulatory Referral to Cardiac Electrophysiology  -     POCT ECG    2. Dyspnea on exertion    3. Dyslipidemia    4. Severe obesity (BMI 35.0-39. 9) with comorbidity ([address])        IMPRESSION:    • Palpitations  • Dyspnea on exertion  • Abnormal ECG  • Dyslipidemia  • Severe obesity  • LOLY on CPAP  • History of pulmonary embolism on HRT, 2014  • GERD  • Anxiety/depression    PLAN:  It was a pleasure to see Adelina Hope in the office today for initial CV evaluation. She is here today due to her episodes of palpitations and dyspnea on exertion. Palpitations had progressively worsens going into the summer 2023, but improved after switching over to progesterone. Her dyspnea on exertion had been chronic and since onset, she has not undergone stress test.  She has no chest discomfort, but her symptoms are exertional.  She can perform greater than 4 METS, but if she pushes herself much further, she can experience significant dyspnea on exertion. She is tolerating her current medications without any reported adverse effects. ECG shows inferior and anterior T wave abnormalities. Based on her clinical presentation, I have the following recommendations:    1.   Due to the patient's abnormal ECG with inferior and anterior T wave abnormalities as well as her dyspnea on exertion which may present an anginal equivalent as well as her risk factors, recommend exercise nuclear stress test to assess for any evidence of underlying myocardial ischemia. 2.  Would obtain 2D echocardiogram to assess cardiac structure and function  3. We will obtain 2-week event recorder to assess for any evidence of arrhythmia  4. Continue statin therapy. Goal LDL is less than 100 mg/dL. Check repeat lipid panel. 5.  Should her symptoms recur, especially worsening in frequency or severity or change in quality, recommend seeking immediate medical attention/dial 911  6. We will follow-up with her after testing to review the results. As always, please not hesitate to call with any questions. Portions of the record may have been created with voice recognition software. Occasional wrong word or "sound a like" substitutions may have occurred due to the inherent limitations of voice recognition software. Read the chart carefully and recognize, using context, where substitutions have occurred.       Signed: Anny Krishnamurthy DO, [facility] MACKENZIE RODRIGUEZ, ANUP

## 2023-09-27 ENCOUNTER — CONSULT (OUTPATIENT)
Dept: CARDIOLOGY CLINIC | Facility: CLINIC | Age: 53
End: 2023-09-27
Payer: OTHER GOVERNMENT

## 2023-09-27 VITALS
HEIGHT: 63 IN | DIASTOLIC BLOOD PRESSURE: 70 MMHG | WEIGHT: 207 LBS | HEART RATE: 79 BPM | BODY MASS INDEX: 36.68 KG/M2 | SYSTOLIC BLOOD PRESSURE: 106 MMHG

## 2023-09-27 DIAGNOSIS — E66.01 SEVERE OBESITY (BMI 35.0-39.9) WITH COMORBIDITY (HCC): ICD-10-CM

## 2023-09-27 DIAGNOSIS — R94.31 ABNORMAL ECG: ICD-10-CM

## 2023-09-27 DIAGNOSIS — R00.2 PALPITATIONS: Primary | ICD-10-CM

## 2023-09-27 DIAGNOSIS — R06.09 DYSPNEA ON EXERTION: ICD-10-CM

## 2023-09-27 DIAGNOSIS — E78.5 DYSLIPIDEMIA: ICD-10-CM

## 2023-09-27 PROCEDURE — 93000 ELECTROCARDIOGRAM COMPLETE: CPT | Performed by: INTERNAL MEDICINE

## 2023-09-27 PROCEDURE — 99244 OFF/OP CNSLTJ NEW/EST MOD 40: CPT | Performed by: INTERNAL MEDICINE

## 2023-10-04 ENCOUNTER — APPOINTMENT (OUTPATIENT)
Dept: LAB | Facility: MEDICAL CENTER | Age: 53
End: 2023-10-04
Payer: OTHER GOVERNMENT

## 2023-10-04 DIAGNOSIS — E78.5 HYPERLIPIDEMIA, UNSPECIFIED HYPERLIPIDEMIA TYPE: Primary | ICD-10-CM

## 2023-10-04 LAB
CHOLEST SERPL-MCNC: 261 MG/DL
HDLC SERPL-MCNC: 59 MG/DL
LDLC SERPL CALC-MCNC: 141 MG/DL (ref 0–100)
TRIGL SERPL-MCNC: 307 MG/DL

## 2023-10-04 PROCEDURE — 36415 COLL VENOUS BLD VENIPUNCTURE: CPT

## 2023-10-04 PROCEDURE — 80061 LIPID PANEL: CPT

## 2023-10-05 ENCOUNTER — TELEPHONE (OUTPATIENT)
Dept: FAMILY MEDICINE CLINIC | Facility: CLINIC | Age: 53
End: 2023-10-05

## 2023-10-05 DIAGNOSIS — E78.5 HYPERLIPIDEMIA, UNSPECIFIED HYPERLIPIDEMIA TYPE: Primary | ICD-10-CM

## 2023-10-05 RX ORDER — ROSUVASTATIN CALCIUM 5 MG/1
5 TABLET, COATED ORAL DAILY
Qty: 90 TABLET | Refills: 1 | Status: SHIPPED | OUTPATIENT
Start: 2023-10-05

## 2023-10-05 NOTE — TELEPHONE ENCOUNTER
----- Message from Tyson Sal sent at 10/5/2023  8:49 AM EDT -----  Regarding: Cholesterol medicine   Contact: [phone]  See pt's note      ----- Message -----  From: Rocío Rosa  Sent: 10/5/2023   8:44 AM EDT  To: [facility]  Subject: Cholesterol medicine                             I received the results from  My cholesterol test and it was high. I did stop my medicine about 2 months ago thinking it wasn’t doing anything. I’m restarting it but wanted to know if it should be increased with my high number or it it should be switched to something else? When should a repeat blood test for my cholesterol be done to see if it has lowered?

## 2023-10-27 ENCOUNTER — CLINICAL SUPPORT (OUTPATIENT)
Dept: CARDIOLOGY CLINIC | Facility: CLINIC | Age: 53
End: 2023-10-27
Payer: OTHER GOVERNMENT

## 2023-10-27 DIAGNOSIS — R00.2 PALPITATIONS: ICD-10-CM

## 2023-10-27 PROCEDURE — 93248 EXT ECG>7D<15D REV&INTERPJ: CPT | Performed by: INTERNAL MEDICINE

## 2023-10-29 ENCOUNTER — APPOINTMENT (EMERGENCY)
Dept: RADIOLOGY | Facility: HOSPITAL | Age: 53
End: 2023-10-29
Payer: OTHER GOVERNMENT

## 2023-10-29 ENCOUNTER — HOSPITAL ENCOUNTER (EMERGENCY)
Facility: HOSPITAL | Age: 53
Discharge: HOME | End: 2023-10-29
Attending: EMERGENCY MEDICINE
Payer: OTHER GOVERNMENT

## 2023-10-29 VITALS
OXYGEN SATURATION: 100 % | RESPIRATION RATE: 18 BRPM | WEIGHT: 207.23 LBS | BODY MASS INDEX: 36.71 KG/M2 | DIASTOLIC BLOOD PRESSURE: 58 MMHG | HEART RATE: 67 BPM | SYSTOLIC BLOOD PRESSURE: 120 MMHG | TEMPERATURE: 97.5 F

## 2023-10-29 DIAGNOSIS — F41.8 ANXIETY ABOUT HEALTH: Primary | ICD-10-CM

## 2023-10-29 LAB
ALBUMIN SERPL BCP-MCNC: 4 G/DL (ref 3.5–5)
ALP SERPL-CCNC: 73 U/L (ref 34–104)
ALT SERPL W P-5'-P-CCNC: 18 U/L (ref 7–52)
ANION GAP SERPL CALCULATED.3IONS-SCNC: 6 MMOL/L
AST SERPL W P-5'-P-CCNC: 19 U/L (ref 13–39)
ATRIAL RATE: 107 BPM
BASOPHILS # BLD AUTO: 0.05 THOUSANDS/ÂΜL (ref 0–0.1)
BASOPHILS NFR BLD AUTO: 1 % (ref 0–1)
BILIRUB SERPL-MCNC: 0.37 MG/DL (ref 0.2–1)
BUN SERPL-MCNC: 18 MG/DL (ref 5–25)
CALCIUM SERPL-MCNC: 9.1 MG/DL (ref 8.4–10.2)
CARDIAC TROPONIN I PNL SERPL HS: <2 NG/L
CHLORIDE SERPL-SCNC: 109 MMOL/L (ref 96–108)
CO2 SERPL-SCNC: 25 MMOL/L (ref 21–32)
CREAT SERPL-MCNC: 0.59 MG/DL (ref 0.6–1.3)
EOSINOPHIL # BLD AUTO: 0.19 THOUSAND/ÂΜL (ref 0–0.61)
EOSINOPHIL NFR BLD AUTO: 2 % (ref 0–6)
ERYTHROCYTE [DISTWIDTH] IN BLOOD BY AUTOMATED COUNT: 13.1 % (ref 11.6–15.1)
GFR SERPL CREATININE-BSD FRML MDRD: 103 ML/MIN/1.73SQ M
GLUCOSE SERPL-MCNC: 143 MG/DL (ref 65–140)
HCT VFR BLD AUTO: 42.4 % (ref 34.8–46.1)
HGB BLD-MCNC: 13.3 G/DL (ref 11.5–15.4)
IMM GRANULOCYTES # BLD AUTO: 0.03 THOUSAND/UL (ref 0–0.2)
IMM GRANULOCYTES NFR BLD AUTO: 0 % (ref 0–2)
LYMPHOCYTES # BLD AUTO: 1.85 THOUSANDS/ÂΜL (ref 0.6–4.47)
LYMPHOCYTES NFR BLD AUTO: 23 % (ref 14–44)
MCH RBC QN AUTO: 28.7 PG (ref 26.8–34.3)
MCHC RBC AUTO-ENTMCNC: 31.4 G/DL (ref 31.4–37.4)
MCV RBC AUTO: 91 FL (ref 82–98)
MONOCYTES # BLD AUTO: 0.47 THOUSAND/ÂΜL (ref 0.17–1.22)
MONOCYTES NFR BLD AUTO: 6 % (ref 4–12)
NEUTROPHILS # BLD AUTO: 5.33 THOUSANDS/ÂΜL (ref 1.85–7.62)
NEUTS SEG NFR BLD AUTO: 68 % (ref 43–75)
NRBC BLD AUTO-RTO: 0 /100 WBCS
P AXIS: 64 DEGREES
PLATELET # BLD AUTO: 260 THOUSANDS/UL (ref 149–390)
PMV BLD AUTO: 10.4 FL (ref 8.9–12.7)
POTASSIUM SERPL-SCNC: 3.7 MMOL/L (ref 3.5–5.3)
PR INTERVAL: 148 MS
PROT SERPL-MCNC: 6.9 G/DL (ref 6.4–8.4)
QRS AXIS: 26 DEGREES
QRSD INTERVAL: 84 MS
QT INTERVAL: 362 MS
QTC INTERVAL: 483 MS
RBC # BLD AUTO: 4.64 MILLION/UL (ref 3.81–5.12)
SODIUM SERPL-SCNC: 140 MMOL/L (ref 135–147)
T WAVE AXIS: 46 DEGREES
VENTRICULAR RATE: 107 BPM
WBC # BLD AUTO: 7.92 THOUSAND/UL (ref 4.31–10.16)

## 2023-10-29 PROCEDURE — 36415 COLL VENOUS BLD VENIPUNCTURE: CPT

## 2023-10-29 PROCEDURE — 99285 EMERGENCY DEPT VISIT HI MDM: CPT

## 2023-10-29 PROCEDURE — 93010 ELECTROCARDIOGRAM REPORT: CPT | Performed by: INTERNAL MEDICINE

## 2023-10-29 PROCEDURE — 93005 ELECTROCARDIOGRAM TRACING: CPT

## 2023-10-29 PROCEDURE — 99285 EMERGENCY DEPT VISIT HI MDM: CPT | Performed by: EMERGENCY MEDICINE

## 2023-10-29 PROCEDURE — 71046 X-RAY EXAM CHEST 2 VIEWS: CPT

## 2023-10-29 PROCEDURE — 85025 COMPLETE CBC W/AUTO DIFF WBC: CPT

## 2023-10-29 PROCEDURE — 80053 COMPREHEN METABOLIC PANEL: CPT

## 2023-10-29 PROCEDURE — 84484 ASSAY OF TROPONIN QUANT: CPT

## 2023-10-29 NOTE — ED PROVIDER NOTES
History  Chief Complaint   Patient presents with    Shortness of Breath     Pt reports SOB since Friday. Recently had holter monitor on about 2 weeks ago which showed one occurrence of v tach. Pt reports anxious about finding and now feels SOB and chest pain     57-year-old female presents for evaluation of anxiety, dyspnea, and chest tightness. States the symptoms began immediately after seeing results from a recent Holter monitor. She wore the Holter monitor for about 2 weeks, had one 10 beat run of V. tach. She does not have a prior cardiac history and was looking into this arrhythmia online, became very anxious and worried. Patient has an echo and nuclear stress test scheduled for about a week and a half from now. Prior to Admission Medications   Prescriptions Last Dose Informant Patient Reported? Taking?    B Complex Vitamins (VITAMIN B COMPLEX) TABS  Self Yes No   Sig: Take by mouth   Cholecalciferol (VITAMIN D) 2000 units CAPS  Self Yes No   Sig: Take by mouth   Coenzyme Q10 10 MG capsule   Yes No   Sig: Take 10 mg by mouth daily   Multiple Vitamin (MULTIVITAMIN ADULT PO)  Self Yes No   Progesterone 100 MG CAPS   No No   Sig: Take 100 mg by mouth at bedtime   Turmeric (QC TUMERIC COMPLEX PO)  Self Yes No   ammonium lactate (LAC-HYDRIN) 12 % cream   No No   Sig: APPLY TOPICALLY AS NEEDED FOR DRY SKIN   atorvastatin (LIPITOR) 10 mg tablet  Self No No   Sig: TAKE 1 TABLET AT BEDTIME   azelastine (ASTELIN) 0.1 % nasal spray  Self No No   Si spray into each nostril 2 (two) times a day Use in each nostril as directed   fluticasone (FLONASE) 50 mcg/act nasal spray  Self No No   Si spray into each nostril 2 (two) times a day   ondansetron (ZOFRAN) 4 mg tablet   Yes No   Sig: Take 4 mg by mouth   other medication, see sig,   No No   Sig: Medication/product name: testosterone cream  Strength: 4mg/ml  Sig (include dose, route, frequency): apply 0.5 ml ( 2 mg) to labia daily   pantoprazole (PROTONIX) 40 mg tablet  Self Yes No   rosuvastatin (CRESTOR) 5 mg tablet   No No   Sig: Take 1 tablet (5 mg total) by mouth daily      Facility-Administered Medications: None       Past Medical History:   Diagnosis Date    Allergic rhinitis     Anxiety     Depression     Eczema     Endometriosis     Fibromyalgia     GERD (gastroesophageal reflux disease)     Gestational diabetes 2000    Herpes     Hyperlipidemia     Migraine     Nasal congestion     Pulmonary embolism ([address]) 2015    Was on hormone replacement therapy    Sleep apnea     on CPAP treatment    Sleep difficulties        Past Surgical History:   Procedure Laterality Date    BREAST BIOPSY      CARPAL TUNNEL RELEASE       SECTION  1991    CHOLECYSTECTOMY  2013    HEMORRHOID SURGERY  1998    KNEE SURGERY      MENISCECTOMY      TUBAL LIGATION  2007    WISDOM TOOTH EXTRACTION         Family History   Problem Relation Age of Onset    Alzheimer's disease Father     Diabetes Father     Leukemia Mother     Cancer Mother         Leukemia     I have reviewed and agree with the history as documented. E-Cigarette/Vaping    E-Cigarette Use Never User      E-Cigarette/Vaping Substances    Nicotine No     THC No     CBD No     Flavoring No     Other No     Unknown No      Social History     Tobacco Use    Smoking status: Never    Smokeless tobacco: Never   Vaping Use    Vaping Use: Never used   Substance Use Topics    Alcohol use: No     Comment: SOCIAL DRINKER    Drug use: No       Review of Systems   Constitutional:  Negative for chills and fever. HENT:  Negative for ear pain and sore throat. Eyes:  Negative for pain and visual disturbance. Respiratory:  Positive for chest tightness and shortness of breath. Negative for cough. Cardiovascular:  Positive for palpitations. Negative for chest pain. Gastrointestinal:  Negative for abdominal pain and vomiting. Genitourinary:  Negative for dysuria and hematuria.    Musculoskeletal:  Negative for arthralgias and back pain. Skin:  Negative for color change and rash. Neurological:  Negative for seizures and syncope. Psychiatric/Behavioral:  The patient is nervous/anxious. All other systems reviewed and are negative. Physical Exam  Physical Exam  Vitals and nursing note reviewed. Constitutional:       General: She is not in acute distress. Appearance: She is well-developed. HENT:      Head: Normocephalic and atraumatic. Eyes:      Conjunctiva/sclera: Conjunctivae normal.   Cardiovascular:      Rate and Rhythm: Normal rate and regular rhythm. Heart sounds: No murmur heard. Pulmonary:      Effort: Pulmonary effort is normal. No respiratory distress. Breath sounds: Normal breath sounds. Abdominal:      Palpations: Abdomen is soft. Tenderness: There is no abdominal tenderness. Musculoskeletal:         General: No swelling. Cervical back: Neck supple. Skin:     General: Skin is warm and dry. Capillary Refill: Capillary refill takes less than 2 seconds. Neurological:      Mental Status: She is alert. Psychiatric:         Mood and Affect: Mood is anxious. Affect is tearful.          Vital Signs  ED Triage Vitals [10/29/23 1847]   Temperature Pulse Respirations Blood Pressure SpO2   97.5 °F (36.4 °C) 94 17 163/79 100 %      Temp Source Heart Rate Source Patient Position - Orthostatic VS BP Location FiO2 (%)   Oral Monitor Sitting Right arm --      Pain Score       6           Vitals:    10/29/23 1847 10/29/23 2007 10/29/23 2100 10/29/23 2145   BP: 163/79 127/67 131/64 120/58   Pulse: 94 75 66 67   Patient Position - Orthostatic VS: Sitting Lying  Sitting         Visual Acuity      ED Medications  Medications - No data to display    Diagnostic Studies  Results Reviewed       Procedure Component Value Units Date/Time    HS Troponin 0hr (reflex protocol) [[id number]]  (Normal) Collected: 10/29/23 1943    Lab Status: Final result Specimen: Blood from Arm, Right Updated: 10/29/23 2015     hs TnI 0hr <2 ng/L     Comprehensive metabolic panel [[id number]]  (Abnormal) Collected: 10/29/23 1943    Lab Status: Final result Specimen: Blood from Arm, Right Updated: 10/29/23 2008     Sodium 140 mmol/L      Potassium 3.7 mmol/L      Chloride 109 mmol/L      CO2 25 mmol/L      ANION GAP 6 mmol/L      BUN 18 mg/dL      Creatinine 0.59 mg/dL      Glucose 143 mg/dL      Calcium 9.1 mg/dL      AST 19 U/L      ALT 18 U/L      Alkaline Phosphatase 73 U/L      Total Protein 6.9 g/dL      Albumin 4.0 g/dL      Total Bilirubin 0.37 mg/dL      eGFR 103 ml/min/1.73sq m     Narrative:      [place] guidelines for Chronic Kidney Disease (CKD):     Stage 1 with normal or high GFR (GFR > 90 mL/min/1.73 square meters)    Stage 2 Mild CKD (GFR = 60-89 mL/min/1.73 square meters)    Stage 3A Moderate CKD (GFR = 45-59 mL/min/1.73 square meters)    Stage 3B Moderate CKD (GFR = 30-44 mL/min/1.73 square meters)    Stage 4 Severe CKD (GFR = 15-29 mL/min/1.73 square meters)    Stage 5 End Stage CKD (GFR <15 mL/min/1.73 square meters)  Note: GFR calculation is accurate only with a steady state creatinine    CBC and differential [[id number]] Collected: 10/29/23 1943    Lab Status: Final result Specimen: Blood from Arm, Right Updated: 10/29/23 1949     WBC 7.92 Thousand/uL      RBC 4.64 Million/uL      Hemoglobin 13.3 g/dL      Hematocrit 42.4 %      MCV 91 fL      MCH 28.7 pg      MCHC 31.4 g/dL      RDW 13.1 %      MPV 10.4 fL      Platelets 701 Thousands/uL      nRBC 0 /100 WBCs      Neutrophils Relative 68 %      Immat GRANS % 0 %      Lymphocytes Relative 23 %      Monocytes Relative 6 %      Eosinophils Relative 2 %      Basophils Relative 1 %      Neutrophils Absolute 5.33 Thousands/µL      Immature Grans Absolute 0.03 Thousand/uL      Lymphocytes Absolute 1.85 Thousands/µL      Monocytes Absolute 0.47 Thousand/µL      Eosinophils Absolute 0.19 Thousand/µL      Basophils Absolute 0.05 Thousands/µL                    XR chest 2 views   ED Interpretation by Karime Resendez PA-C (10/29 2017)   ED wet read: no acute cardiopulmonary disease. Procedures  Procedures         ED Course                               SBIRT 20yo+      Flowsheet Row Most Recent Value   Initial Alcohol Screen: US AUDIT-C     1. How often do you have a drink containing alcohol? 0 Filed at: 10/29/2023 1948   2. How many drinks containing alcohol do you have on a typical day you are drinking? 0 Filed at: 10/29/2023 1948   3b. FEMALE Any Age, or MALE 65+: How often do you have 4 or more drinks on one occassion? 0 Filed at: 10/29/2023 1948   Audit-C Score 0 Filed at: 10/29/2023 1948   AVNI: How many times in the past year have you. .. Used an illegal drug or used a prescription medication for non-medical reasons? Never Filed at: 10/29/2023 1948                      Medical Decision Making  42-year-old female presents for evaluation of dyspnea, chest tightness, and anxiety after having a 10 beat run of V. tach on her recent 2-week long Holter monitor test.  Has a follow-up echo and nuclear stress coming up soon. Exam: Patient is tearful and highly anxious, AOx3, mildly hypertensive, normal sinus on the monitor, satting well. Heart RRR, no obvious murmurs, lungs CTA B. Expect symptoms to be anxiety provoked. However, considering age history of HLD, and chest pain/dyspnea, we will proceed with an ACS work-up. If unremarkable can likely continue her evaluation outpatient. Work-up: CBC, CMP, troponin, EKG, CXR. Work-up was unremarkable. Educated patient on her results, reassured her that she is in stable condition for outpatient follow-up soon. Advised her to follow-up with her cardiology appointment for echo and stress test as scheduled. Discussed return precautions in case of worsening condition before then.   Recommended touch base with PCP as she may want to consider further options for anxiety management. Patient expresses understanding of the condition, treatment plan, follow-up instructions, and return precautions. Discharged, hemodynamically stable and in NAD. Amount and/or Complexity of Data Reviewed  Labs: ordered. Radiology: ordered and independent interpretation performed. Disposition  Final diagnoses:   Anxiety about health     Time reflects when diagnosis was documented in both MDM as applicable and the Disposition within this note       Time User Action Codes Description Comment    10/29/2023  9:27 PM Harris Cesar Brian [F41.8] Anxiety about health           ED Disposition       ED Disposition   Discharge    Condition   Stable    Date/Time   Sun Oct 29, 2023 2127    Comment   Isaiah Quintana discharge to home/self care.                    Follow-up Information       Follow up With Specialties Details Why Contact Info Additional Information    Burgess Derrick MD Family Medicine  As needed [address] [facility] Emergency Medicine  If symptoms worsen [address] [facility] [id number]  [facility], [address] [place] [address], [id number]            Discharge Medication List as of 10/29/2023  9:30 PM        CONTINUE these medications which have NOT CHANGED    Details   ammonium lactate (LAC-HYDRIN) 12 % cream APPLY TOPICALLY AS NEEDED FOR DRY SKIN, Starting Thu 5/11/2023, Normal      atorvastatin (LIPITOR) 10 mg tablet TAKE 1 TABLET AT BEDTIME, Normal      azelastine (ASTELIN) 0.1 % nasal spray 1 spray into each nostril 2 (two) times a day Use in each nostril as directed, Starting Sat 7/23/2022, Normal      B Complex Vitamins (VITAMIN B COMPLEX) TABS Take by mouth, Historical Med      Cholecalciferol (VITAMIN D) 2000 units CAPS Take by mouth, Historical Med      Coenzyme Q10 10 MG capsule Take 10 mg by mouth daily, Starting Mon 10/3/2022, Historical Med      fluticasone (FLONASE) 50 mcg/act nasal spray 1 spray into each nostril 2 (two) times a day, Starting Wed 10/9/2019, Normal      Multiple Vitamin (MULTIVITAMIN ADULT PO) Historical Med      ondansetron (ZOFRAN) 4 mg tablet Take 4 mg by mouth, Starting Mon 2/13/2023, Historical Med      other medication, see sig, Medication/product name: testosterone cream  Strength: 4mg/ml  Sig (include dose, route, frequency): apply 0.5 ml ( 2 mg) to labia daily, Phone In      pantoprazole (PROTONIX) 40 mg tablet Starting Sat 2/12/2022, Historical Med      Progesterone 100 MG CAPS Take 100 mg by mouth at bedtime, Starting Thu 6/8/2023, Normal      rosuvastatin (CRESTOR) 5 mg tablet Take 1 tablet (5 mg total) by mouth daily, Starting Thu 10/5/2023, Normal      Turmeric (QC TUMERIC COMPLEX PO) Historical Med             No discharge procedures on file.     PDMP Review         Value Time User    PDMP Reviewed  Yes 3/10/2023  4:01 PM Wilfrid Person MD            ED Provider  Electronically Signed by             Gina Rowland PA-C  10/30/23 1743

## 2023-10-30 NOTE — DISCHARGE INSTRUCTIONS
Your labs today were unremarkable. Your EKG and chest x-ray also looked good. I think that a lot of your symptoms are driving from anxiety related to your health. I think that tonight's benign work-up should give you some comfort that you will be okay following up outpatient for further evaluation. Please follow-up with your cardiology appointment as scheduled. You can return to an emergency department for reevaluation if you develop new or worsening chest pain or other symptoms. I recommend talking to your PCP about anxiety management as this may improve your symptoms and quality of life.

## 2023-11-09 ENCOUNTER — HOSPITAL ENCOUNTER (OUTPATIENT)
Dept: NON INVASIVE DIAGNOSTICS | Facility: HOSPITAL | Age: 53
Discharge: HOME | End: 2023-11-09
Attending: INTERNAL MEDICINE
Payer: OTHER GOVERNMENT

## 2023-11-09 ENCOUNTER — HOSPITAL ENCOUNTER (OUTPATIENT)
Dept: NUCLEAR MEDICINE | Facility: HOSPITAL | Age: 53
Discharge: HOME | End: 2023-11-09
Attending: INTERNAL MEDICINE
Payer: OTHER GOVERNMENT

## 2023-11-09 VITALS
DIASTOLIC BLOOD PRESSURE: 58 MMHG | BODY MASS INDEX: 36.68 KG/M2 | SYSTOLIC BLOOD PRESSURE: 120 MMHG | WEIGHT: 207 LBS | HEIGHT: 63 IN | HEART RATE: 70 BPM

## 2023-11-09 DIAGNOSIS — R06.09 DYSPNEA ON EXERTION: ICD-10-CM

## 2023-11-09 DIAGNOSIS — R94.31 ABNORMAL ECG: ICD-10-CM

## 2023-11-09 LAB
AORTIC ROOT: 2.5 CM
APICAL FOUR CHAMBER EJECTION FRACTION: 68 %
E WAVE DECELERATION TIME: 193 MS
E/A RATIO: 0.84
FRACTIONAL SHORTENING: 36 (ref 28–44)
INTERVENTRICULAR SEPTUM IN DIASTOLE (PARASTERNAL SHORT AXIS VIEW): 1 CM
INTERVENTRICULAR SEPTUM: 1 CM (ref 0.6–1.1)
LAAS-AP2: 9.1 CM2
LAAS-AP4: 9.3 CM2
LEFT ATRIUM AREA SYSTOLE SINGLE PLANE A4C: 9.8 CM2
LEFT ATRIUM SIZE: 3.2 CM
LEFT ATRIUM VOLUME (MOD BIPLANE): 16 ML
LEFT ATRIUM VOLUME INDEX (MOD BIPLANE): 8.2 ML/M2
LEFT INTERNAL DIMENSION IN SYSTOLE: 2.9 CM (ref 2.1–4)
LEFT VENTRICULAR INTERNAL DIMENSION IN DIASTOLE: 4.5 CM (ref 3.5–6)
LEFT VENTRICULAR POSTERIOR WALL IN END DIASTOLE: 1 CM
LEFT VENTRICULAR STROKE VOLUME: 60 ML
LVSV (TEICH): 60 ML
MV E'TISSUE VEL-SEP: 10 CM/S
MV PEAK A VEL: 0.58 M/S
MV PEAK E VEL: 49 CM/S
MV STENOSIS PRESSURE HALF TIME: 56 MS
MV VALVE AREA P 1/2 METHOD: 3.93
RIGHT ATRIUM AREA SYSTOLE A4C: 10.1 CM2
RIGHT VENTRICLE ID DIMENSION: 4 CM
SL CV LEFT ATRIUM LENGTH A2C: 4 CM
SL CV PED ECHO LEFT VENTRICLE DIASTOLIC VOLUME (MOD BIPLANE) 2D: 92 ML
SL CV PED ECHO LEFT VENTRICLE SYSTOLIC VOLUME (MOD BIPLANE) 2D: 32 ML
TRICUSPID ANNULAR PLANE SYSTOLIC EXCURSION: 1.8 CM

## 2023-11-09 PROCEDURE — A9502 TC99M TETROFOSMIN: HCPCS

## 2023-11-09 PROCEDURE — 93306 TTE W/DOPPLER COMPLETE: CPT

## 2023-11-09 PROCEDURE — 93306 TTE W/DOPPLER COMPLETE: CPT | Performed by: INTERNAL MEDICINE

## 2023-11-16 NOTE — PROGRESS NOTES
Cardiology Office Note  MD Yakov Small MD, Dina Aponte DO, MD Rain Sin DO, Christy Nicholas DO, [facility]  ----------------------------------------------------------------  [address]  [address] [facility], [facility]    Katherine Rees 52 y.o. female MRN: [id number]  Unit/Bed#:  Encounter: [id number]      History of Present Illness: It was a pleasure to see Katherine Rees in the office today for follow-up CV evaluation. She has a past medical history of dyslipidemia, LOLY on CPAP, GERD, anxiety and depression. Aside from a pacemaker, patient denies family history of coronary artery disease or sudden cardiac death. She established care with us in September 2023. The patient had been experiencing episodes of shortness of breath with physical activity dating back to 2018. Her symptoms have been fairly constant progressing through September 2023. She noted that when she does significant activity especially pushing hard through multiple flights of stairs, she did feel somewhat short of breath. Additionally, she had been having palpitations since her 45s. Her palpitations somewhat worsened while taking estrogen therapy and improved when she was on progesterone in the summer 2023. Due to the patient's palpitations and symptoms of dyspnea on exertion, she was sent for event recorder, echocardiogram and stress test.  Test were performed in October and November 2023. Unfortunately, due to feelings of claustrophobia, she was unable to obtain stress test.  She continues to have some dyspnea on exertion and palpitations remain infrequent, but are very prominent when they do occur. No reported loss of consciousness. Denies lower extremity swelling orthopnea or paroxysmal nocturnal dyspnea. Review of Systems:  Review of Systems   Constitutional: Negative for decreased appetite, fever, weight gain and weight loss.    HENT:  Negative for congestion and sore throat. Eyes:  Negative for visual disturbance. Cardiovascular:  Positive for dyspnea on exertion and palpitations. Negative for chest pain, leg swelling and near-syncope. Respiratory:  Positive for shortness of breath. Negative for cough. Hematologic/Lymphatic: Negative for bleeding problem. Skin:  Negative for rash. Musculoskeletal:  Negative for myalgias and neck pain. Gastrointestinal:  Negative for abdominal pain and nausea. Neurological:  Negative for light-headedness and weakness. Psychiatric/Behavioral:  Negative for depression.         Past Medical History:   Diagnosis Date    Allergic rhinitis     Anxiety     Depression     Eczema     Endometriosis     Fibromyalgia     GERD (gastroesophageal reflux disease)     Gestational diabetes     Herpes     Hyperlipidemia     Migraine     Nasal congestion     Pulmonary embolism ([address])     Was on hormone replacement therapy    Sleep apnea     on CPAP treatment    Sleep difficulties        Past Surgical History:   Procedure Laterality Date    BREAST BIOPSY      CARPAL TUNNEL RELEASE       SECTION  1991    CHOLECYSTECTOMY  2013    HEMORRHOID SURGERY  1998    KNEE SURGERY      MENISCECTOMY      TUBAL LIGATION  2007    WISDOM TOOTH EXTRACTION         Social History     Socioeconomic History    Marital status: /Civil Union     Spouse name: Not on file    Number of children: Not on file    Years of education: Not on file    Highest education level: Not on file   Occupational History    Not on file   Tobacco Use    Smoking status: Never    Smokeless tobacco: Never   Vaping Use    Vaping Use: Never used   Substance and Sexual Activity    Alcohol use: No     Comment: SOCIAL DRINKER    Drug use: No    Sexual activity: Not Currently     Partners: Male     Birth control/protection: Abstinence     Comment:  is not interested   Other Topics Concern    Not on file   Social History Narrative    5 cats     Social Determinants of Health     Financial Resource Strain: Not on file   Food Insecurity: Not on file   Transportation Needs: Not on file   Physical Activity: Not on file   Stress: Not on file   Social Connections: Not on file   Intimate Partner Violence: Not on file   Housing Stability: Not on file       Family History   Problem Relation Age of Onset    Alzheimer's disease Father     Diabetes Father     Leukemia Mother     Cancer Mother         Leukemia       Allergies   Allergen Reactions    Acetazolamide Hives    Other Hives    Amoxicillin-Pot Clavulanate     Iodinated Contrast Media      Needs to be prepped for IVC    Iothalamate Hives     Contrast dye    Oxycodone GI Intolerance    Sulfa Antibiotics          Current Outpatient Medications:     ammonium lactate (LAC-HYDRIN) 12 % cream, APPLY TOPICALLY AS NEEDED FOR DRY SKIN, Disp: 385 g, Rfl: 0    azelastine (ASTELIN) 0.1 % nasal spray, 1 spray into each nostril 2 (two) times a day Use in each nostril as directed, Disp: 1 mL, Rfl: 0    B Complex Vitamins (VITAMIN B COMPLEX) TABS, Take by mouth, Disp: , Rfl:     Cholecalciferol (VITAMIN D) 2000 units CAPS, Take by mouth, Disp: , Rfl:     Coenzyme Q10 10 MG capsule, Take 10 mg by mouth daily, Disp: , Rfl:     fluticasone (FLONASE) 50 mcg/act nasal spray, 1 spray into each nostril 2 (two) times a day, Disp: 1 Bottle, Rfl: 0    Multiple Vitamin (MULTIVITAMIN ADULT PO), , Disp: , Rfl:     other medication, see sig,, Medication/product name: testosterone cream Strength: 4mg/ml Sig (include dose, route, frequency): apply 0.5 ml ( 2 mg) to labia daily, Disp: 15 mg, Rfl: 5    Progesterone 100 MG CAPS, Take 100 mg by mouth at bedtime, Disp: 90 capsule, Rfl: 2    rosuvastatin (CRESTOR) 5 mg tablet, Take 1 tablet (5 mg total) by mouth daily, Disp: 90 tablet, Rfl: 1    Turmeric (QC TUMERIC COMPLEX PO), , Disp: , Rfl:     atorvastatin (LIPITOR) 10 mg tablet, TAKE 1 TABLET AT BEDTIME, Disp: 90 tablet, Rfl: 3 ondansetron (ZOFRAN) 4 mg tablet, Take 4 mg by mouth, Disp: , Rfl:     pantoprazole (PROTONIX) 40 mg tablet, , Disp: , Rfl:     Vitals:    11/17/23 0812   BP: 108/62   BP Location: Right arm   Patient Position: Sitting   Cuff Size: Large   Pulse: 76   Weight: 93 kg (205 lb)   Height: 5' 3" (1.6 m)     Body mass index is 36.31 kg/m². PHYSICAL EXAMINATION:  Gen: Awake, Alert, NAD   Head/eyes: AT/NC, pupils equal and round, Anicteric  ENT: mmm  Neck: Supple, No elevated JVP, trachea midline  Resp: CTA bilaterally no w/r/r  CV: RRR +S1, S2, No m/r/g  Abd: Soft, obese, NT/ND + BS  Ext: no LE edema bilaterally  Neuro: Follows commands, moves all extermities  Psych: Appropriate affect, happy mood, pleasant attitude, non-combative  Skin: warm; no rash, erythema or venous stasis changes on exposed skin    --------------------------------------------------------------------------------  TREADMILL STRESS  No results found for this or any previous visit.     --------------------------------------------------------------------------------  NUCLEAR STRESS TEST: No results found for this or any previous visit. No results found for this or any previous visit.      --------------------------------------------------------------------------------  CATH:  No results found for this or any previous visit.    --------------------------------------------------------------------------------  ECHO:   No results found for this or any previous visit. No results found for this or any previous visit.    --------------------------------------------------------------------------------  HOLTER  No results found for this or any previous visit.     No results found for this or any previous visit.    --------------------------------------------------------------------------------  CAROTIDS  No results found for this or any previous visit.     --------------------------------------------------------------------------------  ECGs:  No results found for this visit on 11/17/23. Lab Results   Component Value Date    WBC 7.92 10/29/2023    HGB 13.3 10/29/2023    HCT 42.4 10/29/2023    MCV 91 10/29/2023     10/29/2023      Lab Results   Component Value Date    SODIUM 140 10/29/2023    K 3.7 10/29/2023     (H) 10/29/2023    CO2 25 10/29/2023    BUN 18 10/29/2023    CREATININE 0.59 (L) 10/29/2023    GLUC 143 (H) 10/29/2023    CALCIUM 9.1 10/29/2023      Lab Results   Component Value Date    HGBA1C 6.1 (H) 10/19/2022      Lab Results   Component Value Date    CHOL 225 (H) 08/22/2013     Lab Results   Component Value Date    HDL 59 10/04/2023    HDL 51 08/22/2013     Lab Results   Component Value Date    LDLCALC 141 (H) 10/04/2023     Lab Results   Component Value Date    TRIG 307 (H) 10/04/2023    TRIG 144 08/22/2013     No results found for: "CHOLHDL"   Lab Results   Component Value Date    INR 0.97 12/30/2020    INR 0.85 11/21/2019    PROTIME 12.7 12/30/2020    PROTIME 11.7 11/21/2019        1. Dyspnea on exertion    2. NSVT (nonsustained ventricular tachycardia) ([address])    3. Dyslipidemia    4. Severe obesity (BMI 35.0-39. 9) with comorbidity ([address])    5. Abnormal ECG        IMPRESSION:    Palpitations  Event recorder w/ SR avg HR 80 bpm, single 21 beat run nonsustained  bpm, rare PVCs, rare PACs, rare atrial couplets/triplets, trigger events correlated with single run nonsustained VT and more frequently with sinus rhythm and no significant arrhythmia, October 2023  Nonsustained VT 21 beats by event recorder, October 2023  Dyspnea on exertion  Abnormal ECG  LVEF 96%, normal diastolic function, AV sclerosis, MV sclerosis, trace MR/TR, November 2023  Dyslipidemia  Severe obesity  LOLY on CPAP  History of pulmonary embolism on HRT, 2014  GERD  Anxiety/depression    PLAN:  It was a pleasure to see Dawit Feliciano in the office today for follow-up CV evaluation. She is here today due to her testing for her palpitations and shortness of breath.   The echocardiogram demonstrated normal left ventricular systolic function without any evidence of significant valvular disease. The event recorder showed sinus rhythm with a single episode of nonsustained VT. The episode was associated with symptoms of palpitations. She has never had loss of consciousness from any of these episodes. ECG has showed inferior and anterior T wave abnormalities. She has continued dyspnea on exertion when performing significant physical activity. The patient is tolerating her current medications without any reported adverse effects. Based on her clinical presentation, the following recommendations:    1. Would initiate Toprol-XL 25 mg twice daily. Would bring her back in 1 week for a nursing blood pressure check to reevaluate her blood pressure and heart rate on the new medication. 2.  We will check cardiac catheterization to assess her coronary arteries due to her inability to undergo stress testing. Risks, benefits and alternatives to cardiac catheterization have been discussed at length including the risk of bleeding, infection, renal failure, CVA, myocardial infarction and death; patient understands these risks and wishes to proceed. Blood work has been ordered. Prednisone prep has been called in.  3.  Continue statin therapy. Goal LDL is less than 100 mg/dL. Repeat lipid panel in 3 to 6 months. 4.  Would initiate aspirin 81 mg daily pending the results of catheterization. 5.  Should she have recurrence of her palpitations especially worsening in frequency or severity or change in quality and especially with loss of consciousness or with progressive or worsening shortness of breath, recommend seeking immediate medical attention. 6.  Referral has been placed to electrophysiology to assess her nonsustained VT. 7.  We will follow-up with her after testing to review the results. As always, please do not hesitate to call with any questions.     Portions of the record may have been created with voice recognition software. Occasional wrong word or "sound a like" substitutions may have occurred due to the inherent limitations of voice recognition software. Read the chart carefully and recognize, using context, where substitutions have occurred.       Signed: Derek Perez DO, [address] [facility], [facility], [facility]

## 2023-11-17 ENCOUNTER — TELEPHONE (OUTPATIENT)
Dept: CARDIOLOGY CLINIC | Facility: CLINIC | Age: 53
End: 2023-11-17

## 2023-11-17 ENCOUNTER — OFFICE VISIT (OUTPATIENT)
Dept: CARDIOLOGY CLINIC | Facility: CLINIC | Age: 53
End: 2023-11-17
Payer: OTHER GOVERNMENT

## 2023-11-17 ENCOUNTER — PREP FOR PROCEDURE (OUTPATIENT)
Dept: CARDIOLOGY CLINIC | Facility: CLINIC | Age: 53
End: 2023-11-17

## 2023-11-17 VITALS
BODY MASS INDEX: 36.32 KG/M2 | SYSTOLIC BLOOD PRESSURE: 108 MMHG | HEIGHT: 63 IN | DIASTOLIC BLOOD PRESSURE: 62 MMHG | HEART RATE: 76 BPM | WEIGHT: 205 LBS

## 2023-11-17 DIAGNOSIS — R94.31 ABNORMAL ECG: ICD-10-CM

## 2023-11-17 DIAGNOSIS — R00.2 PALPITATIONS: ICD-10-CM

## 2023-11-17 DIAGNOSIS — E66.01 SEVERE OBESITY (BMI 35.0-39.9) WITH COMORBIDITY (HCC): ICD-10-CM

## 2023-11-17 DIAGNOSIS — R06.09 DYSPNEA ON EXERTION: Primary | ICD-10-CM

## 2023-11-17 DIAGNOSIS — I47.29 NSVT (NONSUSTAINED VENTRICULAR TACHYCARDIA) (HCC): ICD-10-CM

## 2023-11-17 DIAGNOSIS — E78.5 DYSLIPIDEMIA: ICD-10-CM

## 2023-11-17 PROCEDURE — 99214 OFFICE O/P EST MOD 30 MIN: CPT | Performed by: INTERNAL MEDICINE

## 2023-11-17 RX ORDER — PREDNISONE 20 MG/1
40 TABLET ORAL DAILY
Qty: 4 TABLET | Refills: 0 | Status: SHIPPED | OUTPATIENT
Start: 2023-11-17 | End: 2023-11-19

## 2023-11-17 RX ORDER — ASPIRIN 81 MG/1
81 TABLET, CHEWABLE ORAL DAILY
Qty: 90 TABLET | Refills: 3
Start: 2023-11-17 | End: 2024-02-15

## 2023-11-17 RX ORDER — METOPROLOL SUCCINATE 25 MG/1
25 TABLET, EXTENDED RELEASE ORAL 2 TIMES DAILY
Qty: 180 TABLET | Refills: 3 | Status: SHIPPED | OUTPATIENT
Start: 2023-11-17 | End: 2024-02-15

## 2023-11-17 NOTE — TELEPHONE ENCOUNTER
[facility] @ [facility] on 11/27/2023 CPT 86875 is not required for [employer]: An approval from [employer] is not required for this service.  Select beneficiaries (this includes [employer], [employer] and [employer]) who choose to use network providers may save on their cost-share.

## 2023-11-17 NOTE — TELEPHONE ENCOUNTER
[facility] @ [facility] on 11/27/2023 with Dr. Rebeka Ruggiero. CPT [id number]  Instructions reviewed. No med hold required. Please check prior auth.

## 2023-11-17 NOTE — H&P (VIEW-ONLY)
Cardiology Office Note  MD Yessica Dove MD, Israel Nava DO, MD Juan Ge DO, Aleida Camarillo DO, [facility] [place]  ----------------------------------------------------------------  [address]  [address] [facility], [facility]    Lesa Carter 52 y.o. female MRN: [id number]  Unit/Bed#:  Encounter: [id number]      History of Present Illness: It was a pleasure to see Lesa Carter in the office today for follow-up CV evaluation. She has a past medical history of dyslipidemia, LOLY on CPAP, GERD, anxiety and depression. Aside from a pacemaker, patient denies family history of coronary artery disease or sudden cardiac death. She established care with us in September 2023. The patient had been experiencing episodes of shortness of breath with physical activity dating back to 2018. Her symptoms have been fairly constant progressing through September 2023. She noted that when she does significant activity especially pushing hard through multiple flights of stairs, she did feel somewhat short of breath. Additionally, she had been having palpitations since her 45s. Her palpitations somewhat worsened while taking estrogen therapy and improved when she was on progesterone in the summer 2023. Due to the patient's palpitations and symptoms of dyspnea on exertion, she was sent for event recorder, echocardiogram and stress test.  Test were performed in October and November 2023. Unfortunately, due to feelings of claustrophobia, she was unable to obtain stress test.  She continues to have some dyspnea on exertion and palpitations remain infrequent, but are very prominent when they do occur. No reported loss of consciousness. Denies lower extremity swelling orthopnea or paroxysmal nocturnal dyspnea. Review of Systems:  Review of Systems   Constitutional: Negative for decreased appetite, fever, weight gain and weight loss.    HENT:  Negative for congestion and sore throat. Eyes:  Negative for visual disturbance. Cardiovascular:  Positive for dyspnea on exertion and palpitations. Negative for chest pain, leg swelling and near-syncope. Respiratory:  Positive for shortness of breath. Negative for cough. Hematologic/Lymphatic: Negative for bleeding problem. Skin:  Negative for rash. Musculoskeletal:  Negative for myalgias and neck pain. Gastrointestinal:  Negative for abdominal pain and nausea. Neurological:  Negative for light-headedness and weakness. Psychiatric/Behavioral:  Negative for depression.         Past Medical History:   Diagnosis Date    Allergic rhinitis     Anxiety     Depression     Eczema     Endometriosis     Fibromyalgia     GERD (gastroesophageal reflux disease)     Gestational diabetes     Herpes     Hyperlipidemia     Migraine     Nasal congestion     Pulmonary embolism ([address])     Was on hormone replacement therapy    Sleep apnea     on CPAP treatment    Sleep difficulties        Past Surgical History:   Procedure Laterality Date    BREAST BIOPSY      CARPAL TUNNEL RELEASE       SECTION  1991    CHOLECYSTECTOMY  2013    HEMORRHOID SURGERY  1998    KNEE SURGERY      MENISCECTOMY      TUBAL LIGATION  2007    WISDOM TOOTH EXTRACTION         Social History     Socioeconomic History    Marital status: /Civil Union     Spouse name: Not on file    Number of children: Not on file    Years of education: Not on file    Highest education level: Not on file   Occupational History    Not on file   Tobacco Use    Smoking status: Never    Smokeless tobacco: Never   Vaping Use    Vaping Use: Never used   Substance and Sexual Activity    Alcohol use: No     Comment: SOCIAL DRINKER    Drug use: No    Sexual activity: Not Currently     Partners: Male     Birth control/protection: Abstinence     Comment:  is not interested   Other Topics Concern    Not on file   Social History Narrative    5 cats     Social Determinants of Health     Financial Resource Strain: Not on file   Food Insecurity: Not on file   Transportation Needs: Not on file   Physical Activity: Not on file   Stress: Not on file   Social Connections: Not on file   Intimate Partner Violence: Not on file   Housing Stability: Not on file       Family History   Problem Relation Age of Onset    Alzheimer's disease Father     Diabetes Father     Leukemia Mother     Cancer Mother         Leukemia       Allergies   Allergen Reactions    Acetazolamide Hives    Other Hives    Amoxicillin-Pot Clavulanate     Iodinated Contrast Media      Needs to be prepped for IVC    Iothalamate Hives     Contrast dye    Oxycodone GI Intolerance    Sulfa Antibiotics          Current Outpatient Medications:     ammonium lactate (LAC-HYDRIN) 12 % cream, APPLY TOPICALLY AS NEEDED FOR DRY SKIN, Disp: 385 g, Rfl: 0    aspirin 81 mg chewable tablet, Chew 1 tablet (81 mg total) daily, Disp: 90 tablet, Rfl: 3    atorvastatin (LIPITOR) 10 mg tablet, TAKE 1 TABLET AT BEDTIME, Disp: 90 tablet, Rfl: 3    azelastine (ASTELIN) 0.1 % nasal spray, 1 spray into each nostril 2 (two) times a day Use in each nostril as directed, Disp: 1 mL, Rfl: 0    B Complex Vitamins (VITAMIN B COMPLEX) TABS, Take by mouth, Disp: , Rfl:     Cholecalciferol (VITAMIN D) 2000 units CAPS, Take by mouth, Disp: , Rfl:     Coenzyme Q10 10 MG capsule, Take 10 mg by mouth daily, Disp: , Rfl:     fluticasone (FLONASE) 50 mcg/act nasal spray, 1 spray into each nostril 2 (two) times a day, Disp: 1 Bottle, Rfl: 0    metoprolol succinate (TOPROL-XL) 25 mg 24 hr tablet, Take 1 tablet (25 mg total) by mouth 2 (two) times a day, Disp: 180 tablet, Rfl: 3    Multiple Vitamin (MULTIVITAMIN ADULT PO), , Disp: , Rfl:     ondansetron (ZOFRAN) 4 mg tablet, Take 4 mg by mouth, Disp: , Rfl:     other medication, see sig,, Medication/product name: testosterone cream Strength: 4mg/ml Sig (include dose, route, frequency): apply 0.5 ml ( 2 mg) to labia daily, Disp: 15 mg, Rfl: 5    pantoprazole (PROTONIX) 40 mg tablet, , Disp: , Rfl:     predniSONE 20 mg tablet, Take 2 tablets (40 mg total) by mouth daily for 2 days, Disp: 4 tablet, Rfl: 0    Progesterone 100 MG CAPS, Take 100 mg by mouth at bedtime, Disp: 90 capsule, Rfl: 2    rosuvastatin (CRESTOR) 5 mg tablet, Take 1 tablet (5 mg total) by mouth daily, Disp: 90 tablet, Rfl: 1    Turmeric (QC TUMERIC COMPLEX PO), , Disp: , Rfl:     There were no vitals filed for this visit. There is no height or weight on file to calculate BMI. PHYSICAL EXAMINATION:  Gen: Awake, Alert, NAD   Head/eyes: AT/NC, pupils equal and round, Anicteric  ENT: mmm  Neck: Supple, No elevated JVP, trachea midline  Resp: CTA bilaterally no w/r/r  CV: RRR +S1, S2, No m/r/g  Abd: Soft, obese, NT/ND + BS  Ext: no LE edema bilaterally  Neuro: Follows commands, moves all extermities  Psych: Appropriate affect, happy mood, pleasant attitude, non-combative  Skin: warm; no rash, erythema or venous stasis changes on exposed skin    --------------------------------------------------------------------------------  TREADMILL STRESS  No results found for this or any previous visit.     --------------------------------------------------------------------------------  NUCLEAR STRESS TEST: No results found for this or any previous visit. No results found for this or any previous visit.      --------------------------------------------------------------------------------  CATH:  No results found for this or any previous visit.    --------------------------------------------------------------------------------  ECHO:   No results found for this or any previous visit. No results found for this or any previous visit.    --------------------------------------------------------------------------------  HOLTER  No results found for this or any previous visit.     No results found for this or any previous visit.    --------------------------------------------------------------------------------  CAROTIDS  No results found for this or any previous visit.     --------------------------------------------------------------------------------  ECGs:  No results found for this visit on 11/17/23. Lab Results   Component Value Date    WBC 7.92 10/29/2023    HGB 13.3 10/29/2023    HCT 42.4 10/29/2023    MCV 91 10/29/2023     10/29/2023      Lab Results   Component Value Date    SODIUM 140 10/29/2023    K 3.7 10/29/2023     (H) 10/29/2023    CO2 25 10/29/2023    BUN 18 10/29/2023    CREATININE 0.59 (L) 10/29/2023    GLUC 143 (H) 10/29/2023    CALCIUM 9.1 10/29/2023      Lab Results   Component Value Date    HGBA1C 6.1 (H) 10/19/2022      Lab Results   Component Value Date    CHOL 225 (H) 08/22/2013     Lab Results   Component Value Date    HDL 59 10/04/2023    HDL 51 08/22/2013     Lab Results   Component Value Date    LDLCALC 141 (H) 10/04/2023     Lab Results   Component Value Date    TRIG 307 (H) 10/04/2023    TRIG 144 08/22/2013     No results found for: "CHOLHDL"   Lab Results   Component Value Date    INR 0.97 12/30/2020    INR 0.85 11/21/2019    PROTIME 12.7 12/30/2020    PROTIME 11.7 11/21/2019        1. Dyspnea on exertion    2.  NSVT (nonsustained ventricular tachycardia) ([facility])        IMPRESSION:    Palpitations  Event recorder w/ SR avg HR 80 bpm, single 21 beat run nonsustained  bpm, rare PVCs, rare PACs, rare atrial couplets/triplets, trigger events correlated with single run nonsustained VT and more frequently with sinus rhythm and no significant arrhythmia, October 2023  Nonsustained VT 21 beats by event recorder, October 2023  Dyspnea on exertion  Abnormal ECG  LVEF 67%, normal diastolic function, AV sclerosis, MV sclerosis, trace MR/TR, November 2023  Dyslipidemia  Severe obesity  LOLY on CPAP  History of pulmonary embolism on HRT, 2014  GERD  Anxiety/depression    PLAN:  It was a pleasure to see Dennys Perez in the office today for follow-up CV evaluation. She is here today due to her testing for her palpitations and shortness of breath. The echocardiogram demonstrated normal left ventricular systolic function without any evidence of significant valvular disease. The event recorder showed sinus rhythm with a single episode of nonsustained VT. The episode was associated with symptoms of palpitations. She has never had loss of consciousness from any of these episodes. ECG has showed inferior and anterior T wave abnormalities. She has continued dyspnea on exertion when performing significant physical activity. The patient is tolerating her current medications without any reported adverse effects. Based on her clinical presentation, the following recommendations:    1. Would initiate Toprol-XL 25 mg twice daily. Would bring her back in 1 week for a nursing blood pressure check to reevaluate her blood pressure and heart rate on the new medication. 2.  We will check cardiac catheterization to assess her coronary arteries due to her inability to undergo stress testing. Risks, benefits and alternatives to cardiac catheterization have been discussed at length including the risk of bleeding, infection, renal failure, CVA, myocardial infarction and death; patient understands these risks and wishes to proceed. 3.  Continue statin therapy. Goal LDL is less than 100 mg/dL. Repeat lipid panel in 3 to 6 months. 4.  Would initiate aspirin 81 mg daily pending the results of catheterization. 5.  Should she have recurrence of her palpitations especially worsening in frequency or severity or change in quality and especially with loss of consciousness or with progressive or worsening shortness of breath, recommend seeking immediate medical attention. 6.  Referral has been placed to electrophysiology to assess her nonsustained VT.   7.  We will follow-up with her after testing to review the results. As always, please do not hesitate to call with any questions. Portions of the record may have been created with voice recognition software. Occasional wrong word or "sound a like" substitutions may have occurred due to the inherent limitations of voice recognition software. Read the chart carefully and recognize, using context, where substitutions have occurred.       Signed: Anny Krishnamurthy DO, [facility] [place], [place], [place]

## 2023-11-17 NOTE — H&P
Cardiology Office Note  MD Nicolas Marrero MD, Avery Harper DO, MD Alla Matthews DO, William Kohler DO, [address] [facility]  ----------------------------------------------------------------  [address] [facility]  [address] [facility], [address] [facility]    Patsy Rubio 52 y.o. female MRN: [id number]  Unit/Bed#:  Encounter: [id number]      History of Present Illness: It was a pleasure to see Patsy Rubio in the office today for follow-up CV evaluation. She has a past medical history of dyslipidemia, LOLY on CPAP, GERD, anxiety and depression. Aside from a pacemaker, patient denies family history of coronary artery disease or sudden cardiac death. She established care with us in September 2023. The patient had been experiencing episodes of shortness of breath with physical activity dating back to 2018. Her symptoms have been fairly constant progressing through September 2023. She noted that when she does significant activity especially pushing hard through multiple flights of stairs, she did feel somewhat short of breath. Additionally, she had been having palpitations since her 45s. Her palpitations somewhat worsened while taking estrogen therapy and improved when she was on progesterone in the summer 2023. Due to the patient's palpitations and symptoms of dyspnea on exertion, she was sent for event recorder, echocardiogram and stress test.  Test were performed in October and November 2023. Unfortunately, due to feelings of claustrophobia, she was unable to obtain stress test.  She continues to have some dyspnea on exertion and palpitations remain infrequent, but are very prominent when they do occur. No reported loss of consciousness. Denies lower extremity swelling orthopnea or paroxysmal nocturnal dyspnea. Review of Systems:  Review of Systems   Constitutional: Negative for decreased appetite, fever, weight gain and weight loss.    HENT:  Negative for congestion and sore throat. Eyes:  Negative for visual disturbance. Cardiovascular:  Positive for dyspnea on exertion and palpitations. Negative for chest pain, leg swelling and near-syncope. Respiratory:  Positive for shortness of breath. Negative for cough. Hematologic/Lymphatic: Negative for bleeding problem. Skin:  Negative for rash. Musculoskeletal:  Negative for myalgias and neck pain. Gastrointestinal:  Negative for abdominal pain and nausea. Neurological:  Negative for light-headedness and weakness. Psychiatric/Behavioral:  Negative for depression.         Past Medical History:   Diagnosis Date    Allergic rhinitis     Anxiety     Depression     Eczema     Endometriosis     Fibromyalgia     GERD (gastroesophageal reflux disease)     Gestational diabetes     Herpes     Hyperlipidemia     Migraine     Nasal congestion     Pulmonary embolism ([address])     Was on hormone replacement therapy    Sleep apnea     on CPAP treatment    Sleep difficulties        Past Surgical History:   Procedure Laterality Date    BREAST BIOPSY      CARPAL TUNNEL RELEASE       SECTION  1991    CHOLECYSTECTOMY  2013    HEMORRHOID SURGERY  1998    KNEE SURGERY      MENISCECTOMY      TUBAL LIGATION  2007    WISDOM TOOTH EXTRACTION         Social History     Socioeconomic History    Marital status: /Civil Union     Spouse name: Not on file    Number of children: Not on file    Years of education: Not on file    Highest education level: Not on file   Occupational History    Not on file   Tobacco Use    Smoking status: Never    Smokeless tobacco: Never   Vaping Use    Vaping Use: Never used   Substance and Sexual Activity    Alcohol use: No     Comment: SOCIAL DRINKER    Drug use: No    Sexual activity: Not Currently     Partners: Male     Birth control/protection: Abstinence     Comment:  is not interested   Other Topics Concern    Not on file   Social History Narrative    5 cats     Social Determinants of Health     Financial Resource Strain: Not on file   Food Insecurity: Not on file   Transportation Needs: Not on file   Physical Activity: Not on file   Stress: Not on file   Social Connections: Not on file   Intimate Partner Violence: Not on file   Housing Stability: Not on file       Family History   Problem Relation Age of Onset    Alzheimer's disease Father     Diabetes Father     Leukemia Mother     Cancer Mother         Leukemia       Allergies   Allergen Reactions    Acetazolamide Hives    Other Hives    Amoxicillin-Pot Clavulanate     Iodinated Contrast Media      Needs to be prepped for IVC    Iothalamate Hives     Contrast dye    Oxycodone GI Intolerance    Sulfa Antibiotics          Current Outpatient Medications:     ammonium lactate (LAC-HYDRIN) 12 % cream, APPLY TOPICALLY AS NEEDED FOR DRY SKIN, Disp: 385 g, Rfl: 0    aspirin 81 mg chewable tablet, Chew 1 tablet (81 mg total) daily, Disp: 90 tablet, Rfl: 3    atorvastatin (LIPITOR) 10 mg tablet, TAKE 1 TABLET AT BEDTIME, Disp: 90 tablet, Rfl: 3    azelastine (ASTELIN) 0.1 % nasal spray, 1 spray into each nostril 2 (two) times a day Use in each nostril as directed, Disp: 1 mL, Rfl: 0    B Complex Vitamins (VITAMIN B COMPLEX) TABS, Take by mouth, Disp: , Rfl:     Cholecalciferol (VITAMIN D) 2000 units CAPS, Take by mouth, Disp: , Rfl:     Coenzyme Q10 10 MG capsule, Take 10 mg by mouth daily, Disp: , Rfl:     fluticasone (FLONASE) 50 mcg/act nasal spray, 1 spray into each nostril 2 (two) times a day, Disp: 1 Bottle, Rfl: 0    metoprolol succinate (TOPROL-XL) 25 mg 24 hr tablet, Take 1 tablet (25 mg total) by mouth 2 (two) times a day, Disp: 180 tablet, Rfl: 3    Multiple Vitamin (MULTIVITAMIN ADULT PO), , Disp: , Rfl:     ondansetron (ZOFRAN) 4 mg tablet, Take 4 mg by mouth, Disp: , Rfl:     other medication, see sig,, Medication/product name: testosterone cream Strength: 4mg/ml Sig (include dose, route, frequency): apply 0.5 ml ( 2 mg) to labia daily, Disp: 15 mg, Rfl: 5    pantoprazole (PROTONIX) 40 mg tablet, , Disp: , Rfl:     predniSONE 20 mg tablet, Take 2 tablets (40 mg total) by mouth daily for 2 days, Disp: 4 tablet, Rfl: 0    Progesterone 100 MG CAPS, Take 100 mg by mouth at bedtime, Disp: 90 capsule, Rfl: 2    rosuvastatin (CRESTOR) 5 mg tablet, Take 1 tablet (5 mg total) by mouth daily, Disp: 90 tablet, Rfl: 1    Turmeric (QC TUMERIC COMPLEX PO), , Disp: , Rfl:     There were no vitals filed for this visit. There is no height or weight on file to calculate BMI. PHYSICAL EXAMINATION:  Gen: Awake, Alert, NAD   Head/eyes: AT/NC, pupils equal and round, Anicteric  ENT: mmm  Neck: Supple, No elevated JVP, trachea midline  Resp: CTA bilaterally no w/r/r  CV: RRR +S1, S2, No m/r/g  Abd: Soft, obese, NT/ND + BS  Ext: no LE edema bilaterally  Neuro: Follows commands, moves all extermities  Psych: Appropriate affect, happy mood, pleasant attitude, non-combative  Skin: warm; no rash, erythema or venous stasis changes on exposed skin    --------------------------------------------------------------------------------  TREADMILL STRESS  No results found for this or any previous visit.     --------------------------------------------------------------------------------  NUCLEAR STRESS TEST: No results found for this or any previous visit. No results found for this or any previous visit.      --------------------------------------------------------------------------------  CATH:  No results found for this or any previous visit.    --------------------------------------------------------------------------------  ECHO:   No results found for this or any previous visit. No results found for this or any previous visit.    --------------------------------------------------------------------------------  HOLTER  No results found for this or any previous visit.     No results found for this or any previous visit.    --------------------------------------------------------------------------------  CAROTIDS  No results found for this or any previous visit.     --------------------------------------------------------------------------------  ECGs:  No results found for this visit on 11/17/23. Lab Results   Component Value Date    WBC 7.92 10/29/2023    HGB 13.3 10/29/2023    HCT 42.4 10/29/2023    MCV 91 10/29/2023     10/29/2023      Lab Results   Component Value Date    SODIUM 140 10/29/2023    K 3.7 10/29/2023     (H) 10/29/2023    CO2 25 10/29/2023    BUN 18 10/29/2023    CREATININE 0.59 (L) 10/29/2023    GLUC 143 (H) 10/29/2023    CALCIUM 9.1 10/29/2023      Lab Results   Component Value Date    HGBA1C 6.1 (H) 10/19/2022      Lab Results   Component Value Date    CHOL 225 (H) 08/22/2013     Lab Results   Component Value Date    HDL 59 10/04/2023    HDL 51 08/22/2013     Lab Results   Component Value Date    LDLCALC 141 (H) 10/04/2023     Lab Results   Component Value Date    TRIG 307 (H) 10/04/2023    TRIG 144 08/22/2013     No results found for: "CHOLHDL"   Lab Results   Component Value Date    INR 0.97 12/30/2020    INR 0.85 11/21/2019    PROTIME 12.7 12/30/2020    PROTIME 11.7 11/21/2019        1. Dyspnea on exertion    2.  NSVT (nonsustained ventricular tachycardia) ([facility])        IMPRESSION:    Palpitations  Event recorder w/ SR avg HR 80 bpm, single 21 beat run nonsustained  bpm, rare PVCs, rare PACs, rare atrial couplets/triplets, trigger events correlated with single run nonsustained VT and more frequently with sinus rhythm and no significant arrhythmia, October 2023  Nonsustained VT 21 beats by event recorder, October 2023  Dyspnea on exertion  Abnormal ECG  LVEF 46%, normal diastolic function, AV sclerosis, MV sclerosis, trace MR/TR, November 2023  Dyslipidemia  Severe obesity  LOLY on CPAP  History of pulmonary embolism on HRT, 2014  GERD  Anxiety/depression    PLAN:  It was a pleasure to see Simi Bernard in the office today for follow-up CV evaluation. She is here today due to her testing for her palpitations and shortness of breath. The echocardiogram demonstrated normal left ventricular systolic function without any evidence of significant valvular disease. The event recorder showed sinus rhythm with a single episode of nonsustained VT. The episode was associated with symptoms of palpitations. She has never had loss of consciousness from any of these episodes. ECG has showed inferior and anterior T wave abnormalities. She has continued dyspnea on exertion when performing significant physical activity. The patient is tolerating her current medications without any reported adverse effects. Based on her clinical presentation, the following recommendations:    1. Would initiate Toprol-XL 25 mg twice daily. Would bring her back in 1 week for a nursing blood pressure check to reevaluate her blood pressure and heart rate on the new medication. 2.  We will check cardiac catheterization to assess her coronary arteries due to her inability to undergo stress testing. Risks, benefits and alternatives to cardiac catheterization have been discussed at length including the risk of bleeding, infection, renal failure, CVA, myocardial infarction and death; patient understands these risks and wishes to proceed. 3.  Continue statin therapy. Goal LDL is less than 100 mg/dL. Repeat lipid panel in 3 to 6 months. 4.  Would initiate aspirin 81 mg daily pending the results of catheterization. 5.  Should she have recurrence of her palpitations especially worsening in frequency or severity or change in quality and especially with loss of consciousness or with progressive or worsening shortness of breath, recommend seeking immediate medical attention. 6.  Referral has been placed to electrophysiology to assess her nonsustained VT.   7.  We will follow-up with her after testing to review the results. As always, please do not hesitate to call with any questions. Portions of the record may have been created with voice recognition software. Occasional wrong word or "sound a like" substitutions may have occurred due to the inherent limitations of voice recognition software. Read the chart carefully and recognize, using context, where substitutions have occurred.       Signed: Anny Krishnamurthy DO, [id number] [facility]

## 2023-11-18 ENCOUNTER — APPOINTMENT (OUTPATIENT)
Dept: LAB | Facility: MEDICAL CENTER | Age: 53
End: 2023-11-18
Payer: OTHER GOVERNMENT

## 2023-11-18 DIAGNOSIS — N95.1 MENOPAUSAL SYMPTOMS: ICD-10-CM

## 2023-11-18 DIAGNOSIS — R00.2 PALPITATIONS: ICD-10-CM

## 2023-11-18 DIAGNOSIS — M79.7 FIBROMYALGIA: ICD-10-CM

## 2023-11-18 DIAGNOSIS — I47.29 NSVT (NONSUSTAINED VENTRICULAR TACHYCARDIA) (HCC): ICD-10-CM

## 2023-11-18 DIAGNOSIS — R06.09 DYSPNEA ON EXERTION: ICD-10-CM

## 2023-11-18 LAB
ANION GAP SERPL CALCULATED.3IONS-SCNC: 7 MMOL/L
APTT PPP: 32 SECONDS (ref 23–37)
BASOPHILS # BLD AUTO: 0.03 THOUSANDS/ÂΜL (ref 0–0.1)
BASOPHILS NFR BLD AUTO: 1 % (ref 0–1)
BUN SERPL-MCNC: 10 MG/DL (ref 5–25)
CALCIUM SERPL-MCNC: 9.7 MG/DL (ref 8.4–10.2)
CHLORIDE SERPL-SCNC: 107 MMOL/L (ref 96–108)
CO2 SERPL-SCNC: 28 MMOL/L (ref 21–32)
CREAT SERPL-MCNC: 0.67 MG/DL (ref 0.6–1.3)
EOSINOPHIL # BLD AUTO: 0.17 THOUSAND/ÂΜL (ref 0–0.61)
EOSINOPHIL NFR BLD AUTO: 3 % (ref 0–6)
ERYTHROCYTE [DISTWIDTH] IN BLOOD BY AUTOMATED COUNT: 13.1 % (ref 11.6–15.1)
GFR SERPL CREATININE-BSD FRML MDRD: 99 ML/MIN/1.73SQ M
GLUCOSE P FAST SERPL-MCNC: 119 MG/DL (ref 65–99)
HCT VFR BLD AUTO: 40.5 % (ref 34.8–46.1)
HGB BLD-MCNC: 13 G/DL (ref 11.5–15.4)
IMM GRANULOCYTES # BLD AUTO: 0.01 THOUSAND/UL (ref 0–0.2)
IMM GRANULOCYTES NFR BLD AUTO: 0 % (ref 0–2)
INR PPP: 0.93 (ref 0.84–1.19)
LYMPHOCYTES # BLD AUTO: 1.7 THOUSANDS/ÂΜL (ref 0.6–4.47)
LYMPHOCYTES NFR BLD AUTO: 28 % (ref 14–44)
MAGNESIUM SERPL-MCNC: 1.8 MG/DL (ref 1.9–2.7)
MCH RBC QN AUTO: 29.5 PG (ref 26.8–34.3)
MCHC RBC AUTO-ENTMCNC: 32.1 G/DL (ref 31.4–37.4)
MCV RBC AUTO: 92 FL (ref 82–98)
MONOCYTES # BLD AUTO: 0.31 THOUSAND/ÂΜL (ref 0.17–1.22)
MONOCYTES NFR BLD AUTO: 5 % (ref 4–12)
NEUTROPHILS # BLD AUTO: 3.88 THOUSANDS/ÂΜL (ref 1.85–7.62)
NEUTS SEG NFR BLD AUTO: 63 % (ref 43–75)
NRBC BLD AUTO-RTO: 0 /100 WBCS
PLATELET # BLD AUTO: 258 THOUSANDS/UL (ref 149–390)
PMV BLD AUTO: 11.7 FL (ref 8.9–12.7)
POTASSIUM SERPL-SCNC: 3.8 MMOL/L (ref 3.5–5.3)
PROTHROMBIN TIME: 12.4 SECONDS (ref 11.6–14.5)
RBC # BLD AUTO: 4.41 MILLION/UL (ref 3.81–5.12)
SODIUM SERPL-SCNC: 142 MMOL/L (ref 135–147)
TESTOST SERPL-MSCNC: 40 NG/DL
TSH SERPL DL<=0.05 MIU/L-ACNC: 1.91 UIU/ML (ref 0.45–4.5)
WBC # BLD AUTO: 6.1 THOUSAND/UL (ref 4.31–10.16)

## 2023-11-18 PROCEDURE — 80048 BASIC METABOLIC PNL TOTAL CA: CPT | Performed by: INTERNAL MEDICINE

## 2023-11-18 PROCEDURE — 85025 COMPLETE CBC W/AUTO DIFF WBC: CPT | Performed by: INTERNAL MEDICINE

## 2023-11-18 PROCEDURE — 85730 THROMBOPLASTIN TIME PARTIAL: CPT | Performed by: INTERNAL MEDICINE

## 2023-11-18 PROCEDURE — 85610 PROTHROMBIN TIME: CPT

## 2023-11-18 PROCEDURE — 84403 ASSAY OF TOTAL TESTOSTERONE: CPT

## 2023-11-18 PROCEDURE — 83735 ASSAY OF MAGNESIUM: CPT | Performed by: INTERNAL MEDICINE

## 2023-11-18 PROCEDURE — 82627 DEHYDROEPIANDROSTERONE: CPT

## 2023-11-18 PROCEDURE — 84443 ASSAY THYROID STIM HORMONE: CPT | Performed by: INTERNAL MEDICINE

## 2023-11-18 PROCEDURE — 36415 COLL VENOUS BLD VENIPUNCTURE: CPT | Performed by: INTERNAL MEDICINE

## 2023-11-20 LAB — DHEA-S SERPL-MCNC: 183 UG/DL (ref 29.4–220.5)

## 2023-11-27 ENCOUNTER — HOSPITAL ENCOUNTER (OUTPATIENT)
Facility: HOSPITAL | Age: 53
Setting detail: OUTPATIENT SURGERY
Discharge: HOME | End: 2023-11-27
Attending: INTERNAL MEDICINE | Admitting: INTERNAL MEDICINE
Payer: OTHER GOVERNMENT

## 2023-11-27 VITALS
RESPIRATION RATE: 18 BRPM | TEMPERATURE: 97.6 F | BODY MASS INDEX: 35.79 KG/M2 | OXYGEN SATURATION: 96 % | HEIGHT: 63 IN | DIASTOLIC BLOOD PRESSURE: 78 MMHG | WEIGHT: 202 LBS | SYSTOLIC BLOOD PRESSURE: 122 MMHG | HEART RATE: 81 BPM

## 2023-11-27 DIAGNOSIS — I20.1 VASOSPASTIC ANGINA (HCC): Primary | ICD-10-CM

## 2023-11-27 DIAGNOSIS — E66.9 CLASS 2 OBESITY WITH BODY MASS INDEX (BMI) OF 35.0 TO 35.9 IN ADULT, UNSPECIFIED OBESITY TYPE, UNSPECIFIED WHETHER SERIOUS COMORBIDITY PRESENT: ICD-10-CM

## 2023-11-27 DIAGNOSIS — I47.29 NSVT (NONSUSTAINED VENTRICULAR TACHYCARDIA) (HCC): ICD-10-CM

## 2023-11-27 DIAGNOSIS — R06.09 DYSPNEA ON EXERTION: ICD-10-CM

## 2023-11-27 LAB
ANION GAP SERPL CALCULATED.3IONS-SCNC: 7 MMOL/L
ATRIAL RATE: 98 BPM
BUN SERPL-MCNC: 14 MG/DL (ref 5–25)
CALCIUM SERPL-MCNC: 9.8 MG/DL (ref 8.4–10.2)
CHLORIDE SERPL-SCNC: 107 MMOL/L (ref 96–108)
CO2 SERPL-SCNC: 27 MMOL/L (ref 21–32)
CREAT SERPL-MCNC: 0.67 MG/DL (ref 0.6–1.3)
GFR SERPL CREATININE-BSD FRML MDRD: 99 ML/MIN/1.73SQ M
GLUCOSE P FAST SERPL-MCNC: 158 MG/DL (ref 65–99)
GLUCOSE SERPL-MCNC: 158 MG/DL (ref 65–140)
P AXIS: 40 DEGREES
POTASSIUM SERPL-SCNC: 3.7 MMOL/L (ref 3.5–5.3)
PR INTERVAL: 148 MS
QRS AXIS: -2 DEGREES
QRSD INTERVAL: 84 MS
QT INTERVAL: 370 MS
QTC INTERVAL: 472 MS
SODIUM SERPL-SCNC: 141 MMOL/L (ref 135–147)
T WAVE AXIS: 11 DEGREES
VENTRICULAR RATE: 98 BPM

## 2023-11-27 PROCEDURE — C1769 GUIDE WIRE: HCPCS | Performed by: INTERNAL MEDICINE

## 2023-11-27 PROCEDURE — 99153 MOD SED SAME PHYS/QHP EA: CPT | Performed by: INTERNAL MEDICINE

## 2023-11-27 PROCEDURE — 93458 L HRT ARTERY/VENTRICLE ANGIO: CPT | Performed by: INTERNAL MEDICINE

## 2023-11-27 PROCEDURE — 93005 ELECTROCARDIOGRAM TRACING: CPT

## 2023-11-27 PROCEDURE — 99152 MOD SED SAME PHYS/QHP 5/>YRS: CPT | Performed by: INTERNAL MEDICINE

## 2023-11-27 PROCEDURE — 93010 ELECTROCARDIOGRAM REPORT: CPT | Performed by: INTERNAL MEDICINE

## 2023-11-27 PROCEDURE — C1894 INTRO/SHEATH, NON-LASER: HCPCS | Performed by: INTERNAL MEDICINE

## 2023-11-27 PROCEDURE — 80048 BASIC METABOLIC PNL TOTAL CA: CPT | Performed by: INTERNAL MEDICINE

## 2023-11-27 RX ORDER — LIDOCAINE HYDROCHLORIDE 10 MG/ML
INJECTION, SOLUTION EPIDURAL; INFILTRATION; INTRACAUDAL; PERINEURAL CODE/TRAUMA/SEDATION MEDICATION
Status: DISCONTINUED | OUTPATIENT
Start: 2023-11-27 | End: 2023-11-27 | Stop reason: HOSPADM

## 2023-11-27 RX ORDER — HEPARIN SODIUM 1000 [USP'U]/ML
INJECTION, SOLUTION INTRAVENOUS; SUBCUTANEOUS CODE/TRAUMA/SEDATION MEDICATION
Status: DISCONTINUED | OUTPATIENT
Start: 2023-11-27 | End: 2023-11-27 | Stop reason: HOSPADM

## 2023-11-27 RX ORDER — ASPIRIN 81 MG/1
324 TABLET, CHEWABLE ORAL ONCE
Status: COMPLETED | OUTPATIENT
Start: 2023-11-27 | End: 2023-11-27

## 2023-11-27 RX ORDER — DIPHENHYDRAMINE HYDROCHLORIDE 50 MG/ML
INJECTION INTRAMUSCULAR; INTRAVENOUS CODE/TRAUMA/SEDATION MEDICATION
Status: DISCONTINUED | OUTPATIENT
Start: 2023-11-27 | End: 2023-11-27 | Stop reason: HOSPADM

## 2023-11-27 RX ORDER — FENTANYL CITRATE 50 UG/ML
INJECTION, SOLUTION INTRAMUSCULAR; INTRAVENOUS CODE/TRAUMA/SEDATION MEDICATION
Status: DISCONTINUED | OUTPATIENT
Start: 2023-11-27 | End: 2023-11-27 | Stop reason: HOSPADM

## 2023-11-27 RX ORDER — NITROGLYCERIN 20 MG/100ML
INJECTION INTRAVENOUS CODE/TRAUMA/SEDATION MEDICATION
Status: DISCONTINUED | OUTPATIENT
Start: 2023-11-27 | End: 2023-11-27 | Stop reason: HOSPADM

## 2023-11-27 RX ORDER — MIDAZOLAM HYDROCHLORIDE 2 MG/2ML
INJECTION, SOLUTION INTRAMUSCULAR; INTRAVENOUS CODE/TRAUMA/SEDATION MEDICATION
Status: DISCONTINUED | OUTPATIENT
Start: 2023-11-27 | End: 2023-11-27 | Stop reason: HOSPADM

## 2023-11-27 RX ORDER — ACETAMINOPHEN 325 MG/1
650 TABLET ORAL EVERY 6 HOURS PRN
Status: DISCONTINUED | OUTPATIENT
Start: 2023-11-27 | End: 2023-11-27 | Stop reason: HOSPADM

## 2023-11-27 RX ORDER — VERAPAMIL HYDROCHLORIDE 2.5 MG/ML
INJECTION, SOLUTION INTRAVENOUS CODE/TRAUMA/SEDATION MEDICATION
Status: DISCONTINUED | OUTPATIENT
Start: 2023-11-27 | End: 2023-11-27 | Stop reason: HOSPADM

## 2023-11-27 RX ORDER — AMLODIPINE BESYLATE 5 MG/1
5 TABLET ORAL DAILY
Qty: 30 TABLET | Refills: 2 | Status: SHIPPED | OUTPATIENT
Start: 2023-11-27 | End: 2024-02-25

## 2023-11-27 RX ORDER — SODIUM CHLORIDE 9 MG/ML
100 INJECTION, SOLUTION INTRAVENOUS CONTINUOUS
Status: DISPENSED | OUTPATIENT
Start: 2023-11-27 | End: 2023-11-27

## 2023-11-27 RX ORDER — SODIUM CHLORIDE 9 MG/ML
125 INJECTION, SOLUTION INTRAVENOUS CONTINUOUS
Status: DISCONTINUED | OUTPATIENT
Start: 2023-11-27 | End: 2023-11-27 | Stop reason: HOSPADM

## 2023-11-27 RX ORDER — PREDNISONE 10 MG/1
20 TABLET ORAL DAILY
COMMUNITY
End: 2023-11-27

## 2023-11-27 RX ADMIN — ACETAMINOPHEN 325MG 650 MG: 325 TABLET ORAL at 12:51

## 2023-11-27 RX ADMIN — ASPIRIN 81 MG CHEWABLE TABLET 324 MG: 81 TABLET CHEWABLE at 07:08

## 2023-11-27 RX ADMIN — SODIUM CHLORIDE 100 ML/HR: 0.9 INJECTION, SOLUTION INTRAVENOUS at 10:30

## 2023-11-27 RX ADMIN — SODIUM CHLORIDE 125 ML/HR: 0.9 INJECTION, SOLUTION INTRAVENOUS at 07:23

## 2023-11-27 NOTE — Clinical Note
Left heart catheterization: A 4fr fr4 catheter was advanced over a guidewire into the ascending aorta. After recording ascending aortic pressure, the catheter was advanced across the aortic valve and left ventricular pressure was recorded. The catheter was   pulled back across the aortic valve and into the ascending aorta and pullback pressures were obtained.

## 2023-11-27 NOTE — INTERVAL H&P NOTE
Prior note reviewed. Patient seen and examined. /77   Pulse 86   Temp 97.9 °F (36.6 °C) (Temporal)   Resp 16   Ht 5' 3" (1.6 m)   Wt 91.6 kg (202 lb)   LMP 01/12/2018 (Exact Date) Comment: post menopausal x 5 yrs. SpO2 100%   BMI 35.78 kg/m²      After examining the patient, I find no significant changes to the note since it has been written.      Accept for today's history and physical.    Nirmala Madrid,  11/27/23

## 2023-11-27 NOTE — Clinical Note
Post procedure Kulwindertobi Pike is fully awake alert mentating and appropriate. Speech clear. Spoke with Alf Corey DO and findings discussed.  Readied for transfer

## 2023-11-27 NOTE — DISCHARGE INSTRUCTIONS
1. Please see the post cardiac catheterization dishcarge instructions. No heavy lifting, greater than 10 lbs. or strenuous  activity for 48 hrs. 2.Remove band aid tomorrow. Shower and wash area- wrist gently with soap and water- beginning tomorrow. Rinse and pat dry. Apply new water seal band aid. Repeat this process for 5 days. No powders, creams lotions or antibiotic ointments  for 5 days. No tub baths, hot tubs or swimming for 5 days. 3. Please call our office ([phone]) if you have any fever, redness, swelling, discharge from your wrist access site. 4.No driving for 1 day    After Radial Heart Catheterization   WHAT YOU NEED TO KNOW:   After radial heart catheterization , your healthcare provider may give you specific instructions to follow while you heal. You will also need to care for the catheter site to prevent infection and help it heal.  What to expect after a radial heart catheterization:   You will be attached to a heart monitor until you are fully awake. A heart monitor is an EKG that stays on continuously to record your heart's electrical activity. Healthcare providers will monitor your vital signs and pulses in your arm. You may have a band wrapped tightly around your wrist. The band puts pressure on your catheter site and helps prevent bleeding. A healthcare provider can put air into the band or remove air from the band. Air from the band will gradually be removed to decrease pressure on your wrist. The band may be removed in 2 hours or when your catheter site stops bleeding. Your pressure bandage will be checked for bleeding or swelling. You will need to keep your wrist straight for 2 to 4 hours. Do not  push or pull with your arm. Arm movements can cause serious bleeding. After you are monitored for several hours, you may go home or may need to stay in the hospital overnight. Call your local emergency number (911 in the [address]) if:   You have chest pain.     You have any of the following signs of a heart attack:      Squeezing, pressure, or pain in your chest    You may  also have any of the following:     Discomfort or pain in your back, neck, jaw, stomach, or arm    Shortness of breath    Nausea or vomiting    Lightheadedness or a sudden cold sweat    You have any of the following signs of a stroke:      Numbness or drooping on one side of your face     Weakness in an arm or leg    Confusion or difficulty speaking    Dizziness, a severe headache, or vision loss    You cough up blood. You have trouble breathing. You cannot stop the bleeding from your catheter site even after you hold firm pressure for 10 minutes. Seek care immediately if:   You have a fever or chills. Blood soaks through your bandage. Your stitches come apart. Your hand or arm feels numb, cool, or looks pale. Your catheter site gets swollen quickly. Your catheter site is red, swollen, or draining pus. Your catheter site looks more bruised, or you have new bruising on the side of your wrist.    Call your cardiologist or doctor if:   You have nausea or are vomiting. Your skin is itchy, swollen, or you have a rash. You have questions or concerns about your condition or care. Medicines: You may need any of the following:  Blood thinners  help prevent blood clots. Clots can cause strokes, heart attacks, and death. Many types of blood thinners are available. Your healthcare provider will give you specific instructions for the type you are given. The following are general safety guidelines to follow while you are taking a blood thinner:    Watch for bleeding and bruising. Watch for bleeding from your gums or nose. Watch for blood in your urine and bowel movements. Use a soft washcloth on your skin, and a soft toothbrush to brush your teeth. This can keep your skin and gums from bleeding. If you shave, use an electric shaver. Do not play contact sports.     Tell your dentist and other healthcare providers that you take a blood thinner. Wear a bracelet or necklace that says you take this medicine. Do not start or stop any other medicines or supplements unless your healthcare provider tells you to. Many medicines and supplements cannot be used with blood thinners. Take your blood thinner exactly as prescribed by your healthcare provider. Do not skip does or take less than prescribed. Tell your provider right away if you forget to take your blood thinner, or if you take too much. Acetaminophen  decreases pain and fever. It is available without a doctor's order. Ask how much to take and how often to take it. Follow directions. Read the labels of all other medicines you are using to see if they also contain acetaminophen, or ask your doctor or pharmacist. Acetaminophen can cause liver damage if not taken correctly. Take your medicine as directed. Contact your healthcare provider if you think your medicine is not helping or if you have side effects. Tell your provider if you are allergic to any medicine. Keep a list of the medicines, vitamins, and herbs you take. Include the amounts, and when and why you take them. Bring the list or the pill bottles to follow-up visits. Carry your medicine list with you in case of an emergency. Apply firm, steady pressure if bleeding continues:  A small amount of bleeding from the catheter site is possible. Apply pressure with a clean gauze or towel for 5 to 10 minutes. Call your local emergency number (914 in the [address]) if bleeding becomes heavy or does not stop. Care for the catheter site:  Most bandages can be removed the day after your procedure. Gently clean the catheter site with soap and water. Do not  rub the site. Do not  take a bath, swim, or get in a hot tub until your healthcare provider says these are okay. If you need to cough, support the catheter site with your hand. Activity:  Rest for 1 or 2 days after your procedure.  Slowly start to do more each day. Take walks around your house. Make a plan for rest during the day. Your provider will tell you when you can drive, go back to work, or do other activities. The following activity limits will help reduce pressure on your catheter site and prevent bleeding. Follow these guidelines for 1 week , or as directed:  Do not lift anything heavier than 2 pounds. Do not push or pull with the arm used for the procedure. Avoid activities that use your arm or wrist, such as tennis, bowling, and golf. Drink liquids as directed:  Drink extra liquids if contrast liquid was used during your procedure. Liquid will help flush the contrast out of your body. Ask your healthcare provider how much liquid to drink each day and which liquids are best for you. Do not drink alcohol for at least 24 hours after your procedure. Healthy living tips: The following are general healthy guidelines. If your chest pain is caused by a heart problem, your healthcare provider will give you specific guidelines to follow. Manage other health conditions. Diabetes and high cholesterol increases your risk for another heart attack and stroke. Talk to your provider about your management plan. Do not smoke. Nicotine and other chemicals in cigarettes and cigars can cause lung and heart damage. Ask your provider for information if you currently smoke and need help to quit. E-cigarettes or smokeless tobacco still contain nicotine. Talk to your provider before you use these products. Eat a variety of healthy, low-fat, low-salt foods. Healthy foods include fruits, vegetables, whole-grain breads, low-fat dairy products, beans, lean meats, and fish. Ask for more information about a heart healthy diet, such as the [address] Maintain a healthy weight. Ask your provider what a healthy weight is for you. Your provider can help you create a safe weight-loss plan, if needed. Ask about vaccines you may need.   Some infections can be dangerous for a person who has a heart condition. All adults should get the influenza (flu) vaccine. Get a flu vaccine as soon as recommended each year, usually in September or October. The pneumococcal vaccine is given to adults aged 72 years or older. The vaccine is given every 5 years to prevent pneumococcal disease, such as pneumonia. Get all recommended COVID-19 doses and boosters. Your provider can tell you if you also need other vaccines, and when to get them. Go to cardiac rehabilitation (rehab), if directed: Your cardiologist may recommend that you attend cardiac rehab. This is a program run by specialists who will help you safely strengthen your heart and reduce the risk for more heart disease. The plan includes exercise, relaxation, stress management, and heart-healthy nutrition. Healthcare providers will also check to make sure any medicines you are taking are working. If you have a stent:   Carry your stent card with you at all times. Let all healthcare providers know that you have a stent. If you need an MRI, wait at least 6 to 8 weeks after stent placement, or as directed. Follow up with your cardiologist or doctor as directed: You may need more tests. Write down your questions so you remember to ask them during your visits. CARE AGREEMENT:   You have the right to help plan your care. Learn about your health condition and how it may be treated. Discuss treatment options with your healthcare providers to decide what care you want to receive. You always have the right to refuse treatment. The above information is an  only. It is not intended as medical advice for individual conditions or treatments. Talk to your doctor, nurse or pharmacist before following any medical regimen to see if it is safe and effective for you.   © Copyright [employer] 2023 Information is for End User's use only and may not be sold, redistributed or otherwise used for commercial purposes.

## 2023-11-27 NOTE — Clinical Note
Endorses moderate anxiety, reassurances given. GI upset from anxiety. Pleasant cooperative and appropriate. Spoke with Alannah Mejia DO and informed consent obtained for procedure, all questions answered.

## 2023-11-27 NOTE — DISCHARGE INSTR - AVS FIRST PAGE
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 SITE CARE  1. Please see the post cardiac catheterization dishcarge instructions. No lifting greater than 10 lbs. or strenuous activity for 48 hrs. 2. Remove band aid tomorrow. Shower and wash area (wrist) gently with soap and water- beginning tomorrow. Rinse and pat dry. Apply new water seal band aid. Repeat this process for 5 days. No powders, creams lotions or antibiotic ointments for 5 days. No tub baths, hot tubs/sauna or swimming for 5 days. 3. Please call our office ([phone]) if you have any fever, redness, swelling, discharge from your wrist/access site. 4. No driving for 1 day       -----------------------------------------------------------------------------------------------------------------------------------------------------------------------------  PROCEDURE INFORMATION    LEFT HEART CATHETERIZATION    WHAT YOU NEED TO KNOW:   A left heart catheterization is a procedure to look at your heart and its arteries. You may need this procedure if you have chest pain, heart disease, or your heart is not working as it should. DISCHARGE INSTRUCTIONS:   Follow up with your healthcare provider as directed:  Write down your questions so you remember to ask them during your visits. Limit activity as directed:   Avoid unnecessary stair climbing for 48 hours, if a catheter was put in your groin. Do not place pressure on your arm, hand, or wrist, if the catheter was placed in your wrist. Avoid pushing, pulling, or heavy lifting with that arm. If you need to cough, support the area where the catheter was inserted with your hand. Ask your healthcare provider how long you need to limit movement and avoid certain activities. You may feel like resting more after your procedure. Slowly start to do more each day. Rest when you feel it is needed.   Drink liquids as directed:  Liquids help flush the dye used for your procedure out of your body. Ask your healthcare provider how much liquid to drink each day, and which liquids to drink. Some foods, such as soup and fruit, also provide liquid. Wound care:  Ask your healthcare provider about how to care for your incision wound. Ask when you can get into a tub, shower, or pool. Contact your healthcare provider if:   You have a fever. The skin around your wound is red, swollen, or has pus coming from it. You have trouble breathing, or your skin is itchy, swollen, or has a rash. You have questions or concerns about your condition or care. Seek care immediately or call 911 if: The area where the catheter was placed is swollen and filled with blood or is bleeding. The leg or arm used for the procedure becomes numb or turns white or blue. You feel lightheaded, short of breath, and have chest pain. You cough up blood. You have any of the following signs of a heart attack:      Squeezing, pressure, or pain in your chest that lasts longer than 5 minutes or returns    Discomfort or pain in your back, neck, jaw, stomach, or arm     Trouble breathing    Nausea or vomiting    Lightheadedness or a sudden cold sweat, especially with chest pain or trouble breathing    Your arm or leg feels warm, tender, and painful. It may look swollen and red. You have any of the following signs of a stroke:     Part of your face droops or is numb    Weakness in an arm or leg    Confusion or difficulty speaking    Dizziness, a severe headache, or vision loss  © 2017 [facility] Information is for End User's use only and may not be sold, redistributed or otherwise used for commercial purposes. All illustrations and images included in CareNotes® are the copyrighted property of [employer] or Nolberto Hernandez. The above information is an  only. It is not intended as medical advice for individual conditions or treatments. Talk to your doctor, nurse or pharmacist before following any medical regimen to see if it is safe and effective for you.

## 2023-11-30 ENCOUNTER — CLINICAL SUPPORT (OUTPATIENT)
Dept: CARDIOLOGY CLINIC | Facility: CLINIC | Age: 53
End: 2023-11-30
Payer: OTHER GOVERNMENT

## 2023-11-30 VITALS
HEIGHT: 63 IN | DIASTOLIC BLOOD PRESSURE: 70 MMHG | HEART RATE: 68 BPM | SYSTOLIC BLOOD PRESSURE: 110 MMHG | BODY MASS INDEX: 35.44 KG/M2 | WEIGHT: 200 LBS

## 2023-11-30 DIAGNOSIS — R07.9 CENTRAL CHEST PAIN: Primary | ICD-10-CM

## 2023-11-30 PROCEDURE — 99211 OFF/OP EST MAY X REQ PHY/QHP: CPT

## 2023-11-30 PROCEDURE — RECHECK: Performed by: INTERNAL MEDICINE

## 2023-11-30 NOTE — PROGRESS NOTES
Patient presents today for a one week blood pressure check per Dr. Triston Wood. Patient states she is feeling very dizzy since being put on amlodipine. She also states it has been making her nauseous and she is unable to eat. She states she lost 7lbs since starting amlodipine. She is very upset with how she is feeling and also has concerns regarding the spasms in her heart that were found during the cath that causes the chest pain. She states she takes the amlodipine at night because of how dizzy she gets when taking it. Per Dr. Triston Wood, patient to cut amlodipine in half to take 2.5mg daily to see how she tolerates the lower dose. She has an appointment with Dr. Triston Wood next Monday 12/4/2023. I gave her a blood pressure log to monitor her blood pressure over the next few days leading up to her appointment.

## 2023-12-03 NOTE — PROGRESS NOTES
Cardiology Office Note  MD Santiago Weiner MD, Valerie Sorensen DO, MD Jamin Thakur DO, Alla Stinson DO, [facility]  ----------------------------------------------------------------  [address]  [address] [facility], [address] [facility]    Sparkle Serrato 52 y.o. female MRN: [id number]  Unit/Bed#:  Encounter: [id number]      History of Present Illness: It was a pleasure to see Sparkle Serrato in the office today for follow-up CV evaluation. She has a past medical history of dyslipidemia, LOLY on CPAP, GERD, anxiety and depression. Aside from a pacemaker, patient denies family history of coronary artery disease or sudden cardiac death. She established care with us in September 2023. The patient had been experiencing episodes of shortness of breath with physical activity dating back to 2018. Her symptoms have been fairly constant progressing through September 2023. She noted that when she does significant activity especially pushing hard through multiple flights of stairs, she did feel somewhat short of breath. Additionally, she had been having palpitations since her 45s. Her palpitations somewhat worsened while taking estrogen therapy and improved when she was on progesterone in the summer 2023. Due to the patient's palpitations and symptoms of dyspnea on exertion, she was sent for event recorder, echocardiogram and stress test.  Test were performed in October and November 2023. Unfortunately, due to feelings of claustrophobia, she was unable to obtain stress test.  She was then sent for cardiac catheterization in November 2023 which demonstrated normal coronary arteries, but there was severe vasospasm of the radial artery which was concerning that there may be some degree of vasospasm of the coronary arteries. For this reason, patient was started on amlodipine 5 mg daily.   Unfortunately, she was feeling very unwell with some lightheadedness on the amlodipine. She was decreased from 5 mg daily down to 2.5 mg daily. Despite this change, the patient continued to have significant lightheadedness and was not feeling well. She is here today for routine CV follow-up. No reported loss of consciousness. Denies lower extremity swelling orthopnea or paroxysmal nocturnal dyspnea. She went to the beach in early December 2023 and did not have significant exertional dyspnea. Review of Systems:  Review of Systems   Constitutional: Negative for decreased appetite, fever, weight gain and weight loss. HENT:  Negative for congestion and sore throat. Eyes:  Negative for visual disturbance. Cardiovascular:  Positive for palpitations. Negative for chest pain, dyspnea on exertion, leg swelling and near-syncope. Respiratory:  Negative for cough and shortness of breath. Hematologic/Lymphatic: Negative for bleeding problem. Skin:  Negative for rash. Musculoskeletal:  Negative for myalgias and neck pain. Gastrointestinal:  Negative for abdominal pain and nausea. Neurological:  Negative for light-headedness and weakness. Psychiatric/Behavioral:  Negative for depression. Past Medical History:   Diagnosis Date    Allergic rhinitis     Anxiety     Depression     Eczema     Endometriosis     Fibromyalgia     GERD (gastroesophageal reflux disease)     Gestational diabetes 2000    Herpes 1996    Hyperlipidemia     Migraine     Nasal congestion     Pulmonary embolism ([address]) 2015    Was on hormone replacement therapy    Sleep apnea     on CPAP treatment    Sleep difficulties        Past Surgical History:   Procedure Laterality Date    BREAST BIOPSY  2008    CARDIAC CATHETERIZATION N/A 11/27/2023    Procedure: Cardiac catheterization;  Surgeon: Gabriela Motta DO;  Location: [facility];   Service: Cardiology    CARDIAC CATHETERIZATION N/A 11/27/2023    Procedure: Cardiac Coronary Angiogram;  Surgeon: Gabriela Motta DO;  Location: [facility];  Service: Cardiology    CARDIAC CATHETERIZATION Left 2023    Procedure: Cardiac Left Heart Cath;  Surgeon: Luci Tristan DO;  Location: [facility];   Service: Cardiology    CARPAL TUNNEL RELEASE       SECTION  1991    CHOLECYSTECTOMY  2013    HEMORRHOID SURGERY  1998    KNEE SURGERY      MENISCECTOMY      TUBAL LIGATION  2007    WISDOM TOOTH EXTRACTION         Social History     Socioeconomic History    Marital status: /Civil Union     Spouse name: None    Number of children: None    Years of education: None    Highest education level: None   Occupational History    None   Tobacco Use    Smoking status: Never    Smokeless tobacco: Never   Vaping Use    Vaping Use: Never used   Substance and Sexual Activity    Alcohol use: No     Comment: SOCIAL DRINKER    Drug use: No    Sexual activity: Not Currently     Partners: Male     Birth control/protection: Abstinence     Comment:  is not interested   Other Topics Concern    None   Social History Narrative    5 cats     Social Determinants of Health     Financial Resource Strain: Not on file   Food Insecurity: Not on file   Transportation Needs: Not on file   Physical Activity: Not on file   Stress: Not on file   Social Connections: Not on file   Intimate Partner Violence: Not on file   Housing Stability: Not on file       Family History   Problem Relation Age of Onset    Alzheimer's disease Father     Diabetes Father     Leukemia Mother     Cancer Mother         Leukemia       Allergies   Allergen Reactions    Acetazolamide Hives    Other Hives    Amoxicillin-Pot Clavulanate     Iodinated Contrast Media      Needs to be prepped for IVC    Iothalamate Hives     Contrast dye    Oxycodone GI Intolerance    Sulfa Antibiotics          Current Outpatient Medications:     amLODIPine (NORVASC) 5 mg tablet, Take 1 tablet (5 mg total) by mouth daily (Patient taking differently: Take 2.5 mg by mouth daily), Disp: 30 tablet, Rfl: 2    ammonium lactate (LAC-HYDRIN) 12 % cream, APPLY TOPICALLY AS NEEDED FOR DRY SKIN, Disp: 385 g, Rfl: 0    aspirin 81 mg chewable tablet, Chew 1 tablet (81 mg total) daily, Disp: 90 tablet, Rfl: 3    azelastine (ASTELIN) 0.1 % nasal spray, 1 spray into each nostril 2 (two) times a day Use in each nostril as directed, Disp: 1 mL, Rfl: 0    B Complex Vitamins (VITAMIN B COMPLEX) TABS, Take by mouth, Disp: , Rfl:     Cholecalciferol (VITAMIN D) 2000 units CAPS, Take by mouth, Disp: , Rfl:     Coenzyme Q10 10 MG capsule, Take 10 mg by mouth daily, Disp: , Rfl:     fluticasone (FLONASE) 50 mcg/act nasal spray, 1 spray into each nostril 2 (two) times a day, Disp: 1 Bottle, Rfl: 0    metoprolol succinate (TOPROL-XL) 25 mg 24 hr tablet, Take 1 tablet (25 mg total) by mouth 2 (two) times a day (Patient taking differently: Take 12.5 mg by mouth 2 (two) times a day), Disp: 180 tablet, Rfl: 3    Multiple Vitamin (MULTIVITAMIN ADULT PO), , Disp: , Rfl:     other medication, see sig,, Medication/product name: testosterone cream Strength: 4mg/ml Sig (include dose, route, frequency): apply 0.5 ml ( 2 mg) to labia daily, Disp: 15 mg, Rfl: 5    Progesterone 100 MG CAPS, Take 100 mg by mouth at bedtime, Disp: 90 capsule, Rfl: 2    rosuvastatin (CRESTOR) 5 mg tablet, Take 1 tablet (5 mg total) by mouth daily, Disp: 90 tablet, Rfl: 1    Turmeric (QC TUMERIC COMPLEX PO), , Disp: , Rfl:     ondansetron (ZOFRAN) 4 mg tablet, Take 4 mg by mouth, Disp: , Rfl:     pantoprazole (PROTONIX) 40 mg tablet, , Disp: , Rfl:     Vitals:    12/04/23 1042   BP: 110/80   BP Location: Right arm   Patient Position: Sitting   Cuff Size: Large   Pulse: 80   Weight: 92 kg (202 lb 12.8 oz)       Body mass index is 35.92 kg/m².     PHYSICAL EXAMINATION:  Gen: Awake, Alert, NAD   Head/eyes: AT/NC, pupils equal and round, Anicteric  ENT: mmm  Neck: Supple, No elevated JVP, trachea midline  Resp: CTA bilaterally no w/r/r  CV: RRR +S1, S2, No m/r/g  Abd: Soft, obese, NT/ND + BS  Ext: no LE edema bilaterally  Neuro: Follows commands, moves all extermities  Psych: Appropriate affect, pleasant mood, pleasant attitude, non-combative  Skin: warm; no rash, erythema or venous stasis changes on exposed skin    --------------------------------------------------------------------------------  TREADMILL STRESS  No results found for this or any previous visit.     --------------------------------------------------------------------------------  NUCLEAR STRESS TEST: No results found for this or any previous visit. No results found for this or any previous visit.      --------------------------------------------------------------------------------  CATH:  No results found for this or any previous visit.    --------------------------------------------------------------------------------  ECHO:   No results found for this or any previous visit. No results found for this or any previous visit.    --------------------------------------------------------------------------------  HOLTER  No results found for this or any previous visit. No results found for this or any previous visit.    --------------------------------------------------------------------------------  CAROTIDS  No results found for this or any previous visit.     --------------------------------------------------------------------------------  ECGs:  No results found for this visit on 12/04/23.        Lab Results   Component Value Date    WBC 6.10 11/18/2023    HGB 13.0 11/18/2023    HCT 40.5 11/18/2023    MCV 92 11/18/2023     11/18/2023      Lab Results   Component Value Date    SODIUM 141 11/27/2023    K 3.7 11/27/2023     11/27/2023    CO2 27 11/27/2023    BUN 14 11/27/2023    CREATININE 0.67 11/27/2023    GLUC 158 (H) 11/27/2023    CALCIUM 9.8 11/27/2023      Lab Results   Component Value Date    HGBA1C 6.1 (H) 10/19/2022      Lab Results   Component Value Date    CHOL 225 (H) 08/22/2013     Lab Results   Component Value Date    HDL 59 10/04/2023    HDL 51 08/22/2013     Lab Results   Component Value Date    LDLCALC 141 (H) 10/04/2023     Lab Results   Component Value Date    TRIG 307 (H) 10/04/2023    TRIG 144 08/22/2013     No results found for: "CHOLHDL"   Lab Results   Component Value Date    INR 0.93 11/18/2023    INR 0.97 12/30/2020    INR 0.85 11/21/2019    PROTIME 12.4 11/18/2023    PROTIME 12.7 12/30/2020    PROTIME 11.7 11/21/2019        1. Palpitations  -     Ambulatory referral to Cardiac Electrophysiology; Future  -     MRI cardiac  w wo contrast; Future; Expected date: 12/04/2023    2. NSVT (nonsustained ventricular tachycardia) (HCC)  -     Ambulatory referral to Cardiac Electrophysiology  -     Ambulatory referral to Cardiac Electrophysiology; Future  -     MRI cardiac  w wo contrast; Future; Expected date: 12/04/2023    3. Dyspnea on exertion    4. Severe obesity (BMI 35.0-39. 9) with comorbidity ([address])    5. Dyslipidemia    6. Abnormal ECG        IMPRESSION:    Palpitations  Event recorder w/ SR avg HR 80 bpm, single 21 beat run nonsustained  bpm, rare PVCs, rare PACs, rare atrial couplets/triplets, trigger events correlated with single run nonsustained VT and more frequently with sinus rhythm and no significant arrhythmia, October 2023  Nonsustained VT 21 beats by event recorder, October 2023  Precordial chest pain with possible vasospastic angina  Normal coronary arteries by cardiac catheterization, possible vasospastic angina, November 2023  Dyspnea on exertion  Abnormal ECG  LVEF 57%, normal diastolic function, AV sclerosis, MV sclerosis, trace MR/TR, November 2023  Dyslipidemia  Severe obesity  LOLY on CPAP  History of pulmonary embolism on HRT, 2014  GERD  Anxiety/depression    PLAN:  It was a pleasure to see Yenni Tinajero in the office today for follow-up CV evaluation. She is here today for routine CV follow-up and to review the results of her testing.   Her cardiac catheterization demonstrated normal coronary arteries. There was some concern for vasospasm, however she did not tolerate the amlodipine medication due to lightheadedness. She continues to take her metoprolol. Medication doses similar to prior. She has no signs or symptoms of heart failure and clinically examines to be euvolemic. Denies any loss of consciousness. She had occasional episodes of chest discomfort with twinge in the chest.  Based on her clinical presentation, I have the following recommendations:    1. Would discontinue amlodipine and continue her metoprolol 12.5 mg twice daily with Toprol-XL. 2.  Placed referral to electrophysiology with episode of nonsustained VT and her palpitations/chest discomfort. 3.  We will check cardiac MRI for completeness. Have ordered cardiac MRI with anesthesia due to the patient's significant claustrophobia. 4.  Continue aspirin therapy for now. 5.  Recommend heart healthy diet low in sodium and carbohydrate. Recommend that she stay well-hydrated drinking 2 to 2.5 L of water on a daily basis. 6.  Continue statin therapy. Goal LDL is less than 100 mg/dL. Repeat lipid panel in 6 months. 7.  Should her symptoms recur, especially worsening in frequency or severity or change in quality and especially with worsening chest discomfort or loss of consciousness, recommend seeking immediate medical attention/dial 911.  8.  Encourage follow-up with primary care regarding the concern over her anxiety with everything going on with her symptoms to discuss the logistics of the workplace situation. 9.  We will follow-up with her after EP evaluation and cardiac MRI to review the results. As always, please not hesitate to call with any questions. Portions of the record may have been created with voice recognition software. Occasional wrong word or "sound a like" substitutions may have occurred due to the inherent limitations of voice recognition software.  Read the chart carefully and recognize, using context, where substitutions have occurred.       Signed: Markus Skinner DO, [facility], ANUP MANN

## 2023-12-04 ENCOUNTER — OFFICE VISIT (OUTPATIENT)
Dept: FAMILY MEDICINE CLINIC | Facility: CLINIC | Age: 53
End: 2023-12-04
Payer: OTHER GOVERNMENT

## 2023-12-04 ENCOUNTER — OFFICE VISIT (OUTPATIENT)
Dept: CARDIOLOGY CLINIC | Facility: CLINIC | Age: 53
End: 2023-12-04
Payer: OTHER GOVERNMENT

## 2023-12-04 ENCOUNTER — TELEPHONE (OUTPATIENT)
Dept: CARDIOLOGY CLINIC | Facility: CLINIC | Age: 53
End: 2023-12-04

## 2023-12-04 VITALS
OXYGEN SATURATION: 98 % | DIASTOLIC BLOOD PRESSURE: 78 MMHG | BODY MASS INDEX: 35.61 KG/M2 | TEMPERATURE: 97.2 F | WEIGHT: 201 LBS | HEART RATE: 81 BPM | SYSTOLIC BLOOD PRESSURE: 122 MMHG | HEIGHT: 63 IN | RESPIRATION RATE: 16 BRPM

## 2023-12-04 VITALS
BODY MASS INDEX: 35.92 KG/M2 | HEART RATE: 80 BPM | DIASTOLIC BLOOD PRESSURE: 80 MMHG | WEIGHT: 202.8 LBS | SYSTOLIC BLOOD PRESSURE: 110 MMHG

## 2023-12-04 DIAGNOSIS — R00.2 PALPITATIONS: Primary | ICD-10-CM

## 2023-12-04 DIAGNOSIS — R06.09 DYSPNEA ON EXERTION: ICD-10-CM

## 2023-12-04 DIAGNOSIS — E78.5 DYSLIPIDEMIA: ICD-10-CM

## 2023-12-04 DIAGNOSIS — R22.1 MASS OF LEFT SIDE OF NECK: Primary | ICD-10-CM

## 2023-12-04 DIAGNOSIS — E66.01 SEVERE OBESITY (BMI 35.0-39.9) WITH COMORBIDITY (HCC): ICD-10-CM

## 2023-12-04 DIAGNOSIS — R94.31 ABNORMAL ECG: ICD-10-CM

## 2023-12-04 DIAGNOSIS — I47.29 NSVT (NONSUSTAINED VENTRICULAR TACHYCARDIA) (HCC): ICD-10-CM

## 2023-12-04 PROCEDURE — 99213 OFFICE O/P EST LOW 20 MIN: CPT | Performed by: STUDENT IN AN ORGANIZED HEALTH CARE EDUCATION/TRAINING PROGRAM

## 2023-12-04 PROCEDURE — 99214 OFFICE O/P EST MOD 30 MIN: CPT | Performed by: INTERNAL MEDICINE

## 2023-12-04 NOTE — PROGRESS NOTES
Name: Kassi Mayer      : 1970      MRN: [id number]  Encounter Provider: Jered Porras MD  Encounter Date: 2023   Encounter department: [address] [place] [address]     1. Mass of left side of neck  -     US head neck soft tissue; Future; Expected date: 2023      Depression Screening and Follow-up Plan: Patient was screened for depression during today's encounter. They screened negative with a PHQ-2 score of 2. Patient may be dealing with abscess formation following use of irrigation for tonsil stones. Ultrasound ordered to assess, advised patient to reach out to office if she would develop any signs of infection, including fevers or chills. Advised patient to avoid palpating the area when possible and only to monitor the size. Subjective      Sore Throat   This is a new problem. The current episode started 1 to 4 weeks ago. The pain is worse on the left side. There has been no fever. The pain is at a severity of 4/10. Associated symptoms include congestion (in L ear). Pertinent negatives include no abdominal pain, coughing, diarrhea, drooling, ear discharge, headaches, hoarse voice, neck pain, shortness of breath or vomiting. Treatments tried: alleve. The treatment provided moderate relief. Review of Systems   Constitutional:  Negative for chills and fever. HENT:  Positive for congestion (in L ear) and sore throat. Negative for drooling, ear discharge and hoarse voice. Respiratory:  Negative for cough and shortness of breath. Cardiovascular:  Negative for chest pain and palpitations. Gastrointestinal:  Negative for abdominal pain, constipation, diarrhea, nausea and vomiting. Genitourinary:  Negative for difficulty urinating and dysuria. Musculoskeletal:  Negative for neck pain. Neurological:  Negative for dizziness and headaches.        Current Outpatient Medications on File Prior to Visit   Medication Sig   • ammonium lactate (LAC-HYDRIN) 12 % cream APPLY TOPICALLY AS NEEDED FOR DRY SKIN   • aspirin 81 mg chewable tablet Chew 1 tablet (81 mg total) daily   • azelastine (ASTELIN) 0.1 % nasal spray 1 spray into each nostril 2 (two) times a day Use in each nostril as directed   • B Complex Vitamins (VITAMIN B COMPLEX) TABS Take by mouth   • Cholecalciferol (VITAMIN D) 2000 units CAPS Take by mouth   • Coenzyme Q10 10 MG capsule Take 10 mg by mouth daily   • fluticasone (FLONASE) 50 mcg/act nasal spray 1 spray into each nostril 2 (two) times a day   • metoprolol succinate (TOPROL-XL) 25 mg 24 hr tablet Take 1 tablet (25 mg total) by mouth 2 (two) times a day (Patient taking differently: Take 12.5 mg by mouth 2 (two) times a day)   • Multiple Vitamin (MULTIVITAMIN ADULT PO)    • other medication, see sig, Medication/product name: testosterone cream  Strength: 4mg/ml  Sig (include dose, route, frequency): apply 0.5 ml ( 2 mg) to labia daily   • Progesterone 100 MG CAPS Take 100 mg by mouth at bedtime   • rosuvastatin (CRESTOR) 5 mg tablet Take 1 tablet (5 mg total) by mouth daily   • Turmeric (QC TUMERIC COMPLEX PO)    • amLODIPine (NORVASC) 5 mg tablet Take 1 tablet (5 mg total) by mouth daily (Patient taking differently: Take 2.5 mg by mouth daily)   • ondansetron (ZOFRAN) 4 mg tablet Take 4 mg by mouth   • pantoprazole (PROTONIX) 40 mg tablet        Objective     /78 (BP Location: Left arm, Patient Position: Sitting, Cuff Size: Large)   Pulse 81   Temp (!) 97.2 °F (36.2 °C) (Tympanic)   Resp 16   Ht 5' 3" (1.6 m)   Wt 91.2 kg (201 lb)   LMP 01/12/2018 (Exact Date) Comment: post menopausal x 5 yrs. SpO2 98%   BMI 35.61 kg/m²     Physical Exam  Constitutional:       Appearance: Normal appearance. She is obese. HENT:      Head: Normocephalic and atraumatic. Cardiovascular:      Rate and Rhythm: Normal rate and regular rhythm. Heart sounds: Normal heart sounds. No murmur heard.   Pulmonary:      Effort: Pulmonary effort is normal. No respiratory distress. Breath sounds: Normal breath sounds. No wheezing. Abdominal:      Palpations: Abdomen is soft. Tenderness: There is no abdominal tenderness. Musculoskeletal:      Right lower leg: No edema. Left lower leg: No edema. Skin:     Findings: Lesion (2 cm fluctuant mass inferior to left jawline) present. Neurological:      Mental Status: She is alert.        Elizabeth Oates MD

## 2023-12-05 NOTE — PRE-PROCEDURE INSTRUCTIONS
Pre-Surgery Instructions:   Medication Instructions    aspirin 81 mg chewable tablet Take day of surgery.    azelastine (ASTELIN) 0.1 % nasal spray Take day of surgery.    B Complex Vitamins (VITAMIN B COMPLEX) TABS Hold day of surgery.    Cholecalciferol (VITAMIN D) 2000 units CAPS Hold day of surgery.    Coenzyme Q10 10 MG capsule Hold day of surgery.    fluticasone (FLONASE) 50 mcg/act nasal spray Uses PRN- OK to take day of surgery    metoprolol succinate (TOPROL-XL) 25 mg 24 hr tablet Take day of surgery.    Multiple Vitamin (MULTIVITAMIN ADULT PO) Hold day of surgery.    NON FORMULARY Hold day of surgery.    Progesterone 100 MG CAPS Take day of surgery.    rosuvastatin (CRESTOR) 5 mg tablet Take day of surgery.    Turmeric (QC TUMERIC COMPLEX PO) Hold day of surgery.    The patient should have nothing to eat or drink after 11 pm the night before the MRI. The patient may take their medications with a sip of water at least 2 hours prior to their arrival time.  You will receive a call the evening before your MRI appointment with additional instructions.      Please leave your jewelry and valuables at home, wedding rings are the exception.   Please bring your physician order, insurance cards, a form of photo ID and a list of your medications with you. Arrive 15 minutes prior to your appointment time in order to register. Please bring any prior CT or MRI studies of this area that were not performed at a [facility].

## 2023-12-08 ENCOUNTER — TELEPHONE (OUTPATIENT)
Dept: CARDIOLOGY CLINIC | Facility: CLINIC | Age: 53
End: 2023-12-08

## 2023-12-08 NOTE — TELEPHONE ENCOUNTER
Placed call to patient. She states her symptoms of lightheadedness has completely resolved. She states she feels so much better. Patient states chest pressure and fluttering is also gone.

## 2023-12-08 NOTE — TELEPHONE ENCOUNTER
----- Message from Joshua Arteaga DO sent at 12/4/2023 11:17 AM EST -----  Please reach out to the patient on Friday to see if she is feeling better now that she is discontinued the amlodipine medication. Thank you.

## 2023-12-14 ENCOUNTER — TELEPHONE (OUTPATIENT)
Dept: FAMILY MEDICINE CLINIC | Facility: CLINIC | Age: 53
End: 2023-12-14

## 2023-12-14 NOTE — TELEPHONE ENCOUNTER
Patient stated she still has Left ear blockage and would like to know what she can do or if can prescribe something for it.

## 2023-12-18 ENCOUNTER — OFFICE VISIT (OUTPATIENT)
Dept: FAMILY MEDICINE CLINIC | Facility: CLINIC | Age: 53
End: 2023-12-18
Payer: OTHER GOVERNMENT

## 2023-12-18 VITALS
RESPIRATION RATE: 16 BRPM | WEIGHT: 206 LBS | SYSTOLIC BLOOD PRESSURE: 122 MMHG | DIASTOLIC BLOOD PRESSURE: 78 MMHG | TEMPERATURE: 97.8 F | BODY MASS INDEX: 36.5 KG/M2 | HEIGHT: 63 IN | OXYGEN SATURATION: 98 % | HEART RATE: 82 BPM

## 2023-12-18 DIAGNOSIS — R73.03 PREDIABETES: ICD-10-CM

## 2023-12-18 DIAGNOSIS — R73.9 HYPERGLYCEMIA: Primary | ICD-10-CM

## 2023-12-18 DIAGNOSIS — G89.29 CHRONIC LOW BACK PAIN, UNSPECIFIED BACK PAIN LATERALITY, UNSPECIFIED WHETHER SCIATICA PRESENT: ICD-10-CM

## 2023-12-18 DIAGNOSIS — M54.50 CHRONIC LOW BACK PAIN, UNSPECIFIED BACK PAIN LATERALITY, UNSPECIFIED WHETHER SCIATICA PRESENT: ICD-10-CM

## 2023-12-18 DIAGNOSIS — E78.2 MIXED HYPERLIPIDEMIA: ICD-10-CM

## 2023-12-18 DIAGNOSIS — H92.02 LEFT EAR PAIN: ICD-10-CM

## 2023-12-18 LAB — SL AMB POCT HEMOGLOBIN AIC: 5.9 (ref ?–6.5)

## 2023-12-18 PROCEDURE — 83036 HEMOGLOBIN GLYCOSYLATED A1C: CPT

## 2023-12-18 PROCEDURE — 99214 OFFICE O/P EST MOD 30 MIN: CPT | Performed by: PHYSICIAN ASSISTANT

## 2023-12-18 NOTE — PROGRESS NOTES
Name: Jessica Banda      : 1970      MRN: [id number]  Encounter Provider: Karla Ireland PA-C  Encounter Date: 2023   Encounter department: [facility]    Assessment & Plan     1. Hyperglycemia    2. Prediabetes  -     Ambulatory referral to Diabetic Education - use to refer for diabetes group classes, individual diabetes education, medical nutrition therapy, device training; Future; Expected date: 2023    3. Left ear pain  -     Ambulatory Referral to Otolaryngology; Future    4. Chronic low back pain, unspecified back pain laterality, unspecified whether sciatica present    5. Mixed hyperlipidemia    Today's hemoglobin A1c is at 5.9.  Previously 10/19/2022 6.1  She states that she was not able to get an appointment for weight management until April.  I did suggest she meet with the diabetes educator to help avoiding getting diabetes by learning to count carbs.  She agreed and I did put a referral in the system    She states that she has changed her diet and she has lost weight but she feels as though she could probably do better.  She also has a gym membership and plans on starting her exercise regimen this week.    -Her cholesterol level done on 10/4/2023 increased up to 261 triglycerides 307.  She states she has been eating more fish, chicken and vegetables.  She is also on Crestor 5 mg daily.  I did advise her she is not due to have another level until the end of January.    - Etiology of left ear pressure unknown.  Advised her not sure if it is related to her jaw since she is getting headaches on that side also.  She was not able to get in with her previous ENT specialist so I did put a referral in the system for Dr. Woods  - She has been getting intermittent tingling in her left foot.  I did advise that this may be related to her back.  She notices it more with activities.  She is currently following with a chiropractor.  I did suggest physical  therapy but she states that the treatment was to intense for her.  She has been advised to let Dr. Gan know if any symptoms increase  - I did recommend she make an appointment with Dr. Gan February/March since she commented that she does have a hard time getting in with him because of his busy schedule.    Tunespeak*[employer] software was used to dictate this note. It may contain errors with dictating incorrect words/spelling. Please contact provider directly for any questions.          Subjective      Patient presents today because of concerns about her blood sugars.  She states has been trying to watch her diet more closely.  She did try to make an appointment with medical weight management at [facility] but was not able to get an appointment until April.    Also complains of left ear pressure.  She notices pain in the left forehead region.  She does have intermittent jaw pain.  She states that she was not able to get in with the ENT specialist that she previously saw.    She also states that she has been getting intermittent tingling in her left foot especially when she is on her feet a lot.  She does have a history of chronic back pain and she does follow with a chiropractor.  Denies any weakness.  She states that physical therapy was too intense for her.      Review of Systems    Current Outpatient Medications on File Prior to Visit   Medication Sig    ammonium lactate (LAC-HYDRIN) 12 % cream APPLY TOPICALLY AS NEEDED FOR DRY SKIN    aspirin 81 mg chewable tablet Chew 1 tablet (81 mg total) daily    azelastine (ASTELIN) 0.1 % nasal spray 1 spray into each nostril 2 (two) times a day Use in each nostril as directed    B Complex Vitamins (VITAMIN B COMPLEX) TABS Take by mouth    Cholecalciferol (VITAMIN D) 2000 units CAPS Take by mouth    Coenzyme Q10 10 MG capsule Take 10 mg by mouth daily    fluticasone (FLONASE) 50 mcg/act nasal spray 1 spray into each nostril 2 (two) times a day    Multiple Vitamin  "(MULTIVITAMIN ADULT PO)     Progesterone 100 MG CAPS Take 100 mg by mouth at bedtime    rosuvastatin (CRESTOR) 5 mg tablet Take 1 tablet (5 mg total) by mouth daily    Turmeric (QC TUMERIC COMPLEX PO)     metoprolol succinate (TOPROL-XL) 25 mg 24 hr tablet Take 1 tablet (25 mg total) by mouth 2 (two) times a day (Patient not taking: Reported on 12/18/2023)    other medication, see sig, Medication/product name: testosterone cream  Strength: 4mg/ml  Sig (include dose, route, frequency): apply 0.5 ml ( 2 mg) to labia daily (Patient not taking: Reported on 12/5/2023)    [DISCONTINUED] amLODIPine (NORVASC) 5 mg tablet Take 1 tablet (5 mg total) by mouth daily (Patient taking differently: Take 2.5 mg by mouth daily)    [DISCONTINUED] ondansetron (ZOFRAN) 4 mg tablet Take 4 mg by mouth    [DISCONTINUED] pantoprazole (PROTONIX) 40 mg tablet        Objective     /78 (BP Location: Left arm, Patient Position: Sitting, Cuff Size: Large)   Pulse 82   Temp 97.8 °F (36.6 °C) (Tympanic)   Resp 16   Ht 5' 3\" (1.6 m)   Wt 93.4 kg (206 lb)   LMP 01/12/2018 (Exact Date) Comment: post menopausal x 5 yrs.  SpO2 98%   BMI 36.49 kg/m²     Physical Exam  Vitals reviewed.   Constitutional:       General: She is not in acute distress.     Appearance: Normal appearance. She is not ill-appearing, toxic-appearing or diaphoretic.   HENT:      Head: Normocephalic and atraumatic.      Left Ear: Tympanic membrane normal. There is no impacted cerumen.   Musculoskeletal:      Cervical back: Neck supple.   Neurological:      General: No focal deficit present.      Mental Status: She is alert.   Psychiatric:         Mood and Affect: Mood normal.         Behavior: Behavior normal.         Thought Content: Thought content normal.         Judgment: Judgment normal.       Karla Ireland PA-C    "

## 2023-12-20 ENCOUNTER — TELEPHONE (OUTPATIENT)
Dept: FAMILY MEDICINE CLINIC | Facility: CLINIC | Age: 53
End: 2023-12-20

## 2023-12-20 DIAGNOSIS — R63.4 WEIGHT LOSS: Primary | ICD-10-CM

## 2023-12-20 DIAGNOSIS — R73.03 PREDIABETES: ICD-10-CM

## 2023-12-20 NOTE — TELEPHONE ENCOUNTER
----- Message from Erasto Kelly sent at 12/20/2023  6:42 AM EST -----  Regarding: Diabetes appt   Contact: [phone]  Please review message.       ----- Message -----  From: Jessica Banda  Sent: 12/19/2023   4:54 PM EST  To:  [facility]  Subject: Diabetes appt                                    India Acosta,    I received the call from the diabetes place and she said because i don’t have diabetes that they will not take me or if i want to just have a meal plan done i can pay $140 out of pocket which is not feasible right now. She suggested that you refer me to a nutritionist and my insurance would probably pay for that. Can you give me a name of one instead of who I made an appt with that I can’t get in until April? Thank you

## 2023-12-20 NOTE — TELEPHONE ENCOUNTER
Spoke with pt, she will call her ins co to see if they will cover nutritional referral.  Meanwhile, I sent a referral to  nutritionist

## 2023-12-21 DIAGNOSIS — R73.03 PREDIABETES: ICD-10-CM

## 2023-12-21 DIAGNOSIS — E78.5 HYPERLIPIDEMIA, UNSPECIFIED HYPERLIPIDEMIA TYPE: ICD-10-CM

## 2023-12-21 DIAGNOSIS — E78.00 HYPERCHOLESTEROLEMIA: Primary | ICD-10-CM

## 2023-12-26 ENCOUNTER — ANESTHESIA EVENT (OUTPATIENT)
Dept: RADIOLOGY | Facility: HOSPITAL | Age: 53
End: 2023-12-26

## 2023-12-26 NOTE — ANESTHESIA PREPROCEDURE EVALUATION
Procedure:  MRI CARDIAC  W WO CONTRAST    Relevant Problems   CARDIO   (+) Central chest pain   (+) Dyspnea on exertion   (+) Hypercholesterolemia   (+) Hyperlipidemia   (+) Non-cardiac chest pain   (+) Vasospastic angina (HCC)      GI/HEPATIC   (+) Acute pancreatitis   (+) GERD (gastroesophageal reflux disease)   (+) Hepatic steatosis      MUSCULOSKELETAL   (+) Fibromyalgia   (+) Low back pain   (+) Vasospastic angina (HCC)      NEURO/PSYCH   (+) Anxiety   (+) Fibromyalgia   (+) Headache   (+) Panic disorder   (+) Tingling      PULMONARY   (+) Acute upper respiratory infection   (+) Dyspnea on exertion   (+) Sleep apnea      Cardiovascular and Mediastinum   (+) Pulmonary embolism with infarction (HCC)      Other   (+) Elevated blood sugar   (+) Prediabetes        TTE Nov 2023:  Normal left ventricle size and systolic and diastolic function.  Left ventricle ejection fraction is estimated at 60%.  Normal right ventricle size and systolic function.  Normal left and right atrial cavity size.  Minimal aortic valve sclerosis, no aortic valve stenosis or regurgitation.  Mitral valve leaflet thickening and sclerosis and fibrocalcific changes.  No mitral stenosis.  Trace mitral valve regurgitation.  Trace tricuspid valve regurgitation.  No obvious pulmonary hypertension.  No pericardial effusion.     Compared to report of stress echocardiogram from 5/3/2017 there is no change in left ventricular function.    Cath Nov 2023:  No CAD lesions seen, radial artery spasm    [facility] 11/27/23: Xdi=[id number]    CBC:wnl      Physical Exam    Airway       Dental       Cardiovascular      Pulmonary      Other Findings  post-pubertal.      Anesthesia Plan  ASA Score- 3     Anesthesia Type- general with ASA Monitors.         Additional Monitors:     Airway Plan: LMA.           Plan Factors-        Patient summary reviewed.    Patient is not a current smoker.  Patient did not smoke on day of surgery.    Obstructive sleep apnea risk education given  perioperatively.        Induction- intravenous.    Postoperative Plan- . Planned trial extubation    Informed Consent-

## 2023-12-27 ENCOUNTER — HOSPITAL ENCOUNTER (OUTPATIENT)
Dept: RADIOLOGY | Facility: HOSPITAL | Age: 53
Discharge: HOME | End: 2023-12-27
Attending: INTERNAL MEDICINE
Payer: OTHER GOVERNMENT

## 2023-12-27 ENCOUNTER — ANESTHESIA (OUTPATIENT)
Dept: RADIOLOGY | Facility: HOSPITAL | Age: 53
End: 2023-12-27

## 2023-12-27 VITALS
HEIGHT: 63 IN | TEMPERATURE: 97.1 F | SYSTOLIC BLOOD PRESSURE: 119 MMHG | DIASTOLIC BLOOD PRESSURE: 75 MMHG | BODY MASS INDEX: 35.97 KG/M2 | HEART RATE: 70 BPM | RESPIRATION RATE: 18 BRPM | OXYGEN SATURATION: 98 % | WEIGHT: 203 LBS

## 2023-12-27 DIAGNOSIS — I47.29 NSVT (NONSUSTAINED VENTRICULAR TACHYCARDIA) (HCC): ICD-10-CM

## 2023-12-27 DIAGNOSIS — R00.2 PALPITATIONS: ICD-10-CM

## 2023-12-27 PROCEDURE — G1004 CDSM NDSC: HCPCS

## 2023-12-27 PROCEDURE — 75561 CARDIAC MRI FOR MORPH W/DYE: CPT

## 2023-12-27 PROCEDURE — A9585 GADOBUTROL INJECTION: HCPCS | Performed by: INTERNAL MEDICINE

## 2023-12-27 RX ORDER — MIDAZOLAM HYDROCHLORIDE 2 MG/2ML
INJECTION, SOLUTION INTRAMUSCULAR; INTRAVENOUS AS NEEDED
Status: DISCONTINUED | OUTPATIENT
Start: 2023-12-27 | End: 2023-12-27

## 2023-12-27 RX ORDER — SODIUM CHLORIDE, SODIUM LACTATE, POTASSIUM CHLORIDE, CALCIUM CHLORIDE 600; 310; 30; 20 MG/100ML; MG/100ML; MG/100ML; MG/100ML
20 INJECTION, SOLUTION INTRAVENOUS CONTINUOUS
Status: DISCONTINUED | OUTPATIENT
Start: 2023-12-27 | End: 2023-12-28 | Stop reason: HOSPADM

## 2023-12-27 RX ORDER — LIDOCAINE HYDROCHLORIDE 10 MG/ML
INJECTION, SOLUTION EPIDURAL; INFILTRATION; INTRACAUDAL; PERINEURAL AS NEEDED
Status: DISCONTINUED | OUTPATIENT
Start: 2023-12-27 | End: 2023-12-27

## 2023-12-27 RX ORDER — ONDANSETRON 2 MG/ML
4 INJECTION INTRAMUSCULAR; INTRAVENOUS ONCE AS NEEDED
Status: CANCELLED | OUTPATIENT
Start: 2023-12-27

## 2023-12-27 RX ORDER — PANTOPRAZOLE SODIUM 40 MG/1
40 TABLET, DELAYED RELEASE ORAL DAILY
COMMUNITY

## 2023-12-27 RX ORDER — GADOBUTROL 604.72 MG/ML
18 INJECTION INTRAVENOUS
Status: COMPLETED | OUTPATIENT
Start: 2023-12-27 | End: 2023-12-27

## 2023-12-27 RX ORDER — ONDANSETRON 2 MG/ML
INJECTION INTRAMUSCULAR; INTRAVENOUS AS NEEDED
Status: DISCONTINUED | OUTPATIENT
Start: 2023-12-27 | End: 2023-12-27

## 2023-12-27 RX ORDER — SODIUM CHLORIDE, SODIUM LACTATE, POTASSIUM CHLORIDE, CALCIUM CHLORIDE 600; 310; 30; 20 MG/100ML; MG/100ML; MG/100ML; MG/100ML
INJECTION, SOLUTION INTRAVENOUS CONTINUOUS PRN
Status: DISCONTINUED | OUTPATIENT
Start: 2023-12-27 | End: 2023-12-27

## 2023-12-27 RX ORDER — DEXAMETHASONE SODIUM PHOSPHATE 10 MG/ML
INJECTION, SOLUTION INTRAMUSCULAR; INTRAVENOUS AS NEEDED
Status: DISCONTINUED | OUTPATIENT
Start: 2023-12-27 | End: 2023-12-27

## 2023-12-27 RX ORDER — PROPOFOL 10 MG/ML
INJECTION, EMULSION INTRAVENOUS AS NEEDED
Status: DISCONTINUED | OUTPATIENT
Start: 2023-12-27 | End: 2023-12-27

## 2023-12-27 RX ORDER — SODIUM CHLORIDE, SODIUM LACTATE, POTASSIUM CHLORIDE, CALCIUM CHLORIDE 600; 310; 30; 20 MG/100ML; MG/100ML; MG/100ML; MG/100ML
20 INJECTION, SOLUTION INTRAVENOUS CONTINUOUS
Status: CANCELLED | OUTPATIENT
Start: 2023-12-27

## 2023-12-27 RX ADMIN — MIDAZOLAM 2 MG: 1 INJECTION INTRAMUSCULAR; INTRAVENOUS at 13:48

## 2023-12-27 RX ADMIN — ONDANSETRON 4 MG: 2 INJECTION INTRAMUSCULAR; INTRAVENOUS at 13:59

## 2023-12-27 RX ADMIN — PROPOFOL 200 MG: 10 INJECTION, EMULSION INTRAVENOUS at 13:53

## 2023-12-27 RX ADMIN — LIDOCAINE HYDROCHLORIDE 50 MG: 10 INJECTION, SOLUTION EPIDURAL; INFILTRATION; INTRACAUDAL; PERINEURAL at 13:53

## 2023-12-27 RX ADMIN — DEXAMETHASONE SODIUM PHOSPHATE 4 MG: 10 INJECTION, SOLUTION INTRAMUSCULAR; INTRAVENOUS at 13:59

## 2023-12-27 RX ADMIN — SODIUM CHLORIDE, SODIUM LACTATE, POTASSIUM CHLORIDE, AND CALCIUM CHLORIDE: .6; .31; .03; .02 INJECTION, SOLUTION INTRAVENOUS at 13:43

## 2023-12-27 RX ADMIN — GADOBUTROL 18 ML: 604.72 INJECTION INTRAVENOUS at 15:09

## 2023-12-27 NOTE — ANESTHESIA POSTPROCEDURE EVALUATION
Post-Op Assessment Note    CV Status:  Stable    Pain management: adequate       Mental Status:  Sleepy and lethargic   Hydration Status:  Stable   PONV Controlled:  None   Airway Patency:  Patent     Post Op Vitals Reviewed: Yes      Staff: CRNA, Anesthesiologist               /63 (12/27/23 1445)    Temp (!) 96.4 °F (35.8 °C) (12/27/23 1445)    Pulse 92 (12/27/23 1445)   Resp 16 (12/27/23 1445)    SpO2 99 % (12/27/23 1445)

## 2023-12-27 NOTE — NURSING NOTE
Cardiac MRI with and w/o contrast completed and patient tolerated procedure well. Post-procedure vital signs taken and recorded. Report given to [facility] nurse Yaneth. Patient's glasses were placed on patient's face in recovery per her request. Patient placed in for transport to be taken to [facility]. Patient offers no complaints or verbalizes any issues upon leaving MRI.

## 2024-01-03 ENCOUNTER — TELEPHONE (OUTPATIENT)
Dept: BARIATRICS | Facility: CLINIC | Age: 54
End: 2024-01-03

## 2024-01-04 NOTE — PROGRESS NOTES
Consultation - Electrophysiology-Cardiology (EP)   Jessica Banda 53 y.o. female MRN: [id number]  Unit/Bed#:  Encounter: [id number]      1. Palpitations  Ambulatory referral to Cardiac Electrophysiology    POCT ECG    verapamil (CALAN) 40 mg tablet      2. NSVT (nonsustained ventricular tachycardia) (HCC)  Ambulatory referral to Cardiac Electrophysiology    verapamil (CALAN) 40 mg tablet      3. Hepatic steatosis        4. Sleep apnea, unspecified type        5. Gastroesophageal reflux disease with esophagitis without hemorrhage        6. Impaired fasting glucose        7. Pulmonary embolism with infarction (HCC)        8. Hyperlipidemia, unspecified hyperlipidemia type        9. Severe obesity (BMI 35.0-39.9) with comorbidity (HCC)              Consults  Physician Requesting Consult: Kristofer Ibarra DO   Reason for Consult / Principal Problem: Palpitations & NSVT (nonsustained ventricular tachycardia)          Summary of my recommendation for the patient  Patient has history of palpitations since age 38-almost 15 years  Brief, lasts for 2 to 3 seconds, patient does feel the palpitation, no episode of presyncope or syncope, falls, chest pain, or cardiac arrest    Cardiac catheterization-no significant epicardial disease  However during the study there was severe radial artery spasm and question about coronary spasm  Cardiac MRI-no scar  Stress echocardiogram-no evidence of stress-induced wall motion abnormality    Event monitor-shows these brief episodes which look like VF  Patient was started on amlodipine, getting hypotensive and had to be discontinued  Patient has been started on metoprolol-had episodes despite being on it-it may also provoke vasospasm and hence deciding to discontinue it    Recommend using centrally acting calcium channel blockers  Will start verapamil 40 mg 2 times daily  If the patient tolerates it well will need to call in prescription through [facility] for long-term  Can also  change it to long-acting verapamil 120 mg if arrhythmia is well-controlled    Patient will benefit from long-term monitoring -Recommend 6-year loop recorder-Assert by [employer]    Advised to proceed with dietary restriction and weight loss    Patient's work is extremely stressful and does provoke this palpitation-She is looking into ways to get help about that    If such abnormal rhythm increases in frequency or duration or patient has any symptoms other than palpitation-high risk symptoms like presyncope or syncope-will proceed with EP study and possible device                      Clinical conditions  Palpitations  Obesity, BMI 37  Obstructive sleep apnea on CPAP  GERD  Anxiety and depression  Hypertension  GERD        Assessment/Plan     Palpitations  Patient has history of palpitations since age 38-almost 15 years  Brief, lasts for 2 to 3 seconds, patient does feel the palpitation, no episode of presyncope or syncope, falls, chest pain, or cardiac arrest    Cardiac catheterization-no significant epicardial disease  However during the study there was severe radial artery spasm and question about coronary spasm  Cardiac MRI-no scar  Stress echocardiogram-no evidence of stress-induced wall motion abnormality    Event monitor-shows these brief episodes which look like VF  Patient was started on amlodipine, getting hypotensive and had to be discontinued  Patient has been started on metoprolol-had episodes despite being on it-it may also provoke vasospasm and hence deciding to discontinue it    Recommend using centrally acting calcium channel blockers  Will start verapamil 40 mg 2 times daily  If the patient tolerates it well will need to call in prescription through [facility] for long-term  Can also change it to long-acting verapamil 120 mg if arrhythmia is well-controlled    Patient will benefit from long-term monitoring -Recommend 6-year loop recorder-Assert by [employer]    Advised to proceed with dietary  restriction and weight loss    Patient's work is extremely stressful and does provoke this palpitation-She is looking into ways to get help about that    If such abnormal rhythm increases in frequency or duration or patient has any symptoms other than palpitation-high risk symptoms like presyncope or syncope-will proceed with EP study and possible device                  Obesity, BMI 37  Obstructive sleep apnea on CPAP  Dietary changes to lose weight       GERD  On pantoprazole     Hypertension   Well controlled           History of Present Illness   HPI: Jessica Banda is a 53 y.o. year old female has been referred to me by Dr Ibarra for management of palpitations     The patient has significant medical illnesses which include  Palpitations  Obesity, BMI 37  Obstructive sleep apnea on CPAP  GERD  Anxiety and depression  Hypertension  GERD    Patient has history of palpitations since age 38-almost 15 years  Brief, lasts for 2 to 3 seconds, patient does feel the palpitation, no episode of presyncope or syncope, falls, chest pain, or cardiac arrest    The patient is not complaining of anginal-like chest pain  There is no history of orthopnea or PND  Not complaining of presyncope or syncope  Does have some exertional intolerance    She does not abuse tobacco, alcohol or energy drinks      Historical Information   Past Medical History:   Diagnosis Date    Allergic rhinitis     Anxiety     Depression     Eczema     Endometriosis     Fibromyalgia     GERD (gastroesophageal reflux disease)     Gestational diabetes 2000    Herpes 1996    Hyperlipidemia     Menopause ovarian failure     Migraine     Miscarriage     Nasal congestion     Pulmonary embolism (HCC) 2015    Was on hormone replacement therapy    Sleep apnea     on CPAP treatment    Sleep difficulties      Past Surgical History:   Procedure Laterality Date    BREAST BIOPSY  2008    CARDIAC CATHETERIZATION N/A 11/27/2023    Procedure: Cardiac catheterization;   Surgeon: Keyonna Prescott DO;  Location: [facility];  Service: Cardiology    CARDIAC CATHETERIZATION N/A 2023    Procedure: Cardiac Coronary Angiogram;  Surgeon: Keyonna Prescott DO;  Location: [facility];  Service: Cardiology    CARDIAC CATHETERIZATION Left 2023    Procedure: Cardiac Left Heart Cath;  Surgeon: Keyonna Prescott DO;  Location: [facility];  Service: Cardiology    CARPAL TUNNEL RELEASE       SECTION  1991    CHOLECYSTECTOMY  2013    HEMORRHOID SURGERY  1998    KNEE SURGERY      MENISCECTOMY      TUBAL LIGATION  2007    WISDOM TOOTH EXTRACTION       Social History     Substance and Sexual Activity   Alcohol Use No    Comment: SOCIAL DRINKER     Social History     Substance and Sexual Activity   Drug Use Never     Social History     Tobacco Use   Smoking Status Never   Smokeless Tobacco Never     Social History     Socioeconomic History    Marital status: /Civil Union     Spouse name: Not on file    Number of children: Not on file    Years of education: Not on file    Highest education level: Not on file   Occupational History    Not on file   Tobacco Use    Smoking status: Never    Smokeless tobacco: Never   Vaping Use    Vaping status: Never Used   Substance and Sexual Activity    Alcohol use: No     Comment: SOCIAL DRINKER    Drug use: Never    Sexual activity: Not Currently     Partners: Male     Birth control/protection: Abstinence     Comment:  is not interested   Other Topics Concern    Not on file   Social History Narrative    5 cats     Social Determinants of Health     Financial Resource Strain: Not on file   Food Insecurity: Not on file   Transportation Needs: Not on file   Physical Activity: Not on file   Stress: Not on file   Social Connections: Not on file   Intimate Partner Violence: Not on file   Housing Stability: Not on file     .  Family History:  Family History   Problem Relation Age of Onset    Alzheimer's  "disease Father     Diabetes Father     Leukemia Mother     Cancer Mother         Leukemia         Meds/Allergies      No current facility-administered medications for this visit.        (Not in a hospital admission)      Allergies   Allergen Reactions    Acetazolamide Hives    Other Hives    Amoxicillin-Pot Clavulanate Hives    Iodinated Contrast Media      Needs to be prepped for IVC    Iothalamate Hives     Contrast dye    Oxycodone GI Intolerance and Vomiting    Sulfa Antibiotics Hives           Objective   Vitals: Visit Vitals  /68 (BP Location: Left arm, Patient Position: Sitting, Cuff Size: Standard)   Ht 5' 3\" (1.6 m)   Wt 94.7 kg (208 lb 12.8 oz)   LMP 01/12/2018 (Exact Date) Comment: post menopausal x 5 yrs.   SpO2 99%   BMI 36.99 kg/m²   OB Status Postmenopausal   Smoking Status Never   BSA 1.97 m²      Vitals:    01/09/24 0752   Weight: 94.7 kg (208 lb 12.8 oz)   [unfilled]    Invasive Devices       None                     ROS  Review of Systems   All other systems reviewed and are negative.  As described in my history of present illness        PHYSICAL EXAM  Physical Exam  Vitals reviewed.   Constitutional:       General: She is not in acute distress.     Appearance: Normal appearance. She is obese. She is not ill-appearing.   HENT:      Head: Normocephalic and atraumatic.      Right Ear: External ear normal.      Left Ear: External ear normal.      Nose: Nose normal.      Mouth/Throat:      Comments: Posterior pharynx is crowded   Eyes:      General: No scleral icterus.     Extraocular Movements: Extraocular movements intact.      Conjunctiva/sclera: Conjunctivae normal.      Pupils: Pupils are equal, round, and reactive to light.   Neck:      Comments: Short thick neck   Cardiovascular:      Rate and Rhythm: Normal rate and regular rhythm.      Pulses: Normal pulses.      Heart sounds: Normal heart sounds. No murmur heard.  Pulmonary:      Effort: Pulmonary effort is normal. No respiratory " distress.      Breath sounds: Normal breath sounds. No wheezing.   Abdominal:      General: Bowel sounds are normal. There is no distension.      Palpations: Abdomen is soft.      Tenderness: There is no abdominal tenderness.   Musculoskeletal:         General: No swelling, tenderness or deformity.      Cervical back: Neck supple. No rigidity.   Skin:     Coloration: Skin is not jaundiced.      Findings: No bruising.   Neurological:      General: No focal deficit present.      Mental Status: She is alert and oriented to person, place, and time. Mental status is at baseline.      Motor: No weakness.   Psychiatric:         Mood and Affect: Mood normal.         Behavior: Behavior normal.         Thought Content: Thought content normal.         Judgment: Judgment normal.               LAB RESULTS:    CBC:  WBC   Date Value Ref Range Status   11/18/2023 6.10 4.31 - 10.16 Thousand/uL Final     Hemoglobin   Date Value Ref Range Status   11/18/2023 13.0 11.5 - 15.4 g/dL Final     Hematocrit   Date Value Ref Range Status   11/18/2023 40.5 34.8 - 46.1 % Final     MCV   Date Value Ref Range Status   11/18/2023 92 82 - 98 fL Final     Platelets   Date Value Ref Range Status   11/18/2023 258 149 - 390 Thousands/uL Final     RBC   Date Value Ref Range Status   11/18/2023 4.41 3.81 - 5.12 Million/uL Final     MCH   Date Value Ref Range Status   11/18/2023 29.5 26.8 - 34.3 pg Final     MCHC   Date Value Ref Range Status   11/18/2023 32.1 31.4 - 37.4 g/dL Final     RDW   Date Value Ref Range Status   11/18/2023 13.1 11.6 - 15.1 % Final     MPV   Date Value Ref Range Status   11/18/2023 11.7 8.9 - 12.7 fL Final     nRBC   Date Value Ref Range Status   11/18/2023 0 /100 WBCs Final       CMP:  Potassium   Date Value Ref Range Status   11/27/2023 3.7 3.5 - 5.3 mmol/L Final     Chloride   Date Value Ref Range Status   11/27/2023 107 96 - 108 mmol/L Final     CO2   Date Value Ref Range Status   11/27/2023 27 21 - 32 mmol/L Final     BUN    Date Value Ref Range Status   11/27/2023 14 5 - 25 mg/dL Final     Creatinine   Date Value Ref Range Status   11/27/2023 0.67 0.60 - 1.30 mg/dL Final     Comment:     Standardized to IDMS reference method     Calcium   Date Value Ref Range Status   11/27/2023 9.8 8.4 - 10.2 mg/dL Final     AST   Date Value Ref Range Status   10/29/2023 19 13 - 39 U/L Final     ALT   Date Value Ref Range Status   10/29/2023 18 7 - 52 U/L Final     Comment:     Specimen collection should occur prior to Sulfasalazine administration due to the potential for falsely depressed results.      Alkaline Phosphatase   Date Value Ref Range Status   10/29/2023 73 34 - 104 U/L Final     eGFR   Date Value Ref Range Status   11/27/2023 99 ml/min/1.73sq m Final        Magnesium:   Magnesium   Date Value Ref Range Status   11/18/2023 1.8 (L) 1.9 - 2.7 mg/dL Final        A1C:  Hemoglobin A1C   Date Value Ref Range Status   12/18/2023 5.9 6.5 Final   10/19/2022 6.1 (H) <5.7 % Final     Comment:     Reference Range  Non-diabetic                     <5.7  Pre-diabetic                     5.7-6.4  Diabetic                         >=6.5  ADA target for diabetic control  <=7        TSH:  TSH 3RD GENERATON   Date Value Ref Range Status   11/18/2023 1.912 0.450 - 4.500 uIU/mL Final     Comment:     The recommended reference ranges for TSH during pregnancy are as follows:   First trimester 0.100 to 2.500 uIU/mL   Second trimester  0.200 to 3.000 uIU/mL   Third trimester 0.300 to 3.000 uIU/m    Note: Normal ranges may not apply to patients who are transgender, non-binary, or whose legal sex, sex at birth, and gender identity differ.  Adult TSH (3rd generation) reference range follows the recommended guidelines of the American Thyroid Association, January, 2020.        PT/INR:  Protime   Date Value Ref Range Status   11/18/2023 12.4 11.6 - 14.5 seconds Final     INR   Date Value Ref Range Status   11/18/2023 0.93 0.84 - 1.19 Final       Lipid  Panel:  Cholesterol   Date Value Ref Range Status   10/04/2023 261 (H) See Comment mg/dL Final     Comment:     Cholesterol:         Pediatric <18 Years        Desirable          <170 mg/dL      Borderline High    170-199 mg/dL      High               >=200 mg/dL        Adult >=18 Years            Desirable        <200 mg/dL      Borderline High  200-239 mg/dL      High             >239 mg/dL       Triglycerides   Date Value Ref Range Status   10/04/2023 307 (H) See Comment mg/dL Final     Comment:     Triglyceride:     0-9Y            <75mg/dL     10Y-17Y         <90 mg/dL       >=18Y     Normal          <150 mg/dL     Borderline High 150-199 mg/dL     High            200-499 mg/dL        Very High       >499 mg/dL    Specimen collection should occur prior to Metamizole administration due to the potential for falsely depressed results.   08/22/2013 144 <150 mg/dL Final     HDL   Date Value Ref Range Status   08/22/2013 51 > OR = 46 mg/dL Final     HDL, Direct   Date Value Ref Range Status   10/04/2023 59 >=50 mg/dL Final     NON-HDL-CHOL (CHOL-HDL)   Date Value Ref Range Status   08/22/2013 174 (H) mg/dL (calc) Final     Comment:     Result Comment: Target for non-HDL cholesterol is 30 mg/dL higher than   LDL cholesterol target.    Performed at: [facility]  URSULA WEBB MD, [address] [address]         Troponin:  Troponin I   Date Value Ref Range Status   12/30/2020 <0.02 <=0.04 ng/mL Final     Comment:     3Autovalidation override  [employer] Chemistry analyzer 99% cutoff is > 0.04 ng/mL in network labs     o cTnI 99% cutoff is useful only when applied to patients in the clinical setting of myocardial ischemia   o cTnI 99% cutoff should be interpreted in the context of clinical history, ECG findings and possibly cardiac imaging to establish correct diagnosis.   o cTnI 99% cutoff may be suggestive but clearly not indicative of a coronary event without the clinical  setting of myocardial ischemia.           Imaging:    [id number]        EKATERINA:  No results found for this or any previous visit.      Echocardiogram:  Results for orders placed during the hospital encounter of 23    Echo complete w/ contrast if indicated    Interpretation Summary  Technically fair quality, adequate transthoracic echocardiogram study.    Normal left ventricle size and systolic and diastolic function.  Left ventricle ejection fraction is estimated at 60%.  Normal right ventricle size and systolic function.  Normal left and right atrial cavity size.  Minimal aortic valve sclerosis, no aortic valve stenosis or regurgitation.  Mitral valve leaflet thickening and sclerosis and fibrocalcific changes.  No mitral stenosis.  Trace mitral valve regurgitation.  Trace tricuspid valve regurgitation.  No obvious pulmonary hypertension.  No pericardial effusion.    Compared to report of stress echocardiogram from 5/3/2017 there is no change in left ventricular function.      Stress Test:     Echo Exercise Stress Test  2017    SUMMARY     STRESS RESULTS:  Duration of exercise was 6 min and 30 sec.  Maximal work rate was 7.7 METs.  Maximal heart rate during stress was 162 bpm ( 94 % of maximal predicted heart rate).  Target heart rate was achieved.  The patient experienced chest pain during stress; pain resolved spontaneously.     ECG CONCLUSIONS:  The stress ECG was negative for ischemia.     BASELINE:  There were no regional wall motion abnormalities.  Estimated left ventricular ejection fraction was 60 % .     PEAK STRESS:  There were no regional wall motion abnormalities.     ECHO CONCLUSIONS:  There was no echocardiographic evidence for stress-induced ischemia.     HISTORY: Dyspnea HLD, Fibromyalgia, LOLY, GERD     REST ECG: Normal sinus rhythm. Normal baseline ECG.     PROCEDURE: The study was performed in the stress lab. The study was performed in the [facility] [place]. The  procedure was explained to the patient and informed consent was obtained. Treadmill exercise testing was performed,  using the Annette protocol. Stress and rest echocardiographic evaluation was performed from multiple acoustic windows for evaluation of ventricular function.     ANNETTE PROTOCOL:  HR bpm SBP mmHg DBP mmHg Symptoms  Baseline 113 138 82 none  Stage 1 129 140 80 --  Stage 2 150 142 84 --  Stage 3 160 -- -- --  Recovery 1 114 130 72 --     MEDICATIONS GIVEN: No medications or fluids given.     STRESS RESULTS: Duration of exercise was 6 min and 30 sec. The patient exercised to protocol stage 3. Maximal work rate was 7.7 METs. Maximal heart rate during stress was 162 bpm ( 94 % of maximal predicted heart rate). Target heart rate  was achieved. The heart rate response to stress was normal. Maximal systolic blood pressure during stress was 160 mmHg. There was normal resting blood pressure with an appropriate response to stress. The rate-pressure product for the peak  heart rate and blood pressure was 66282. The patient experienced chest pain during stress; pain resolved spontaneously. The stress test was terminated due to dyspnea and fatigue.     ECG CONCLUSIONS: The stress ECG was negative for ischemia. There were no stress arrhythmias or conduction abnormalities.     STRESS 2D ECHO RESULTS:     BASELINE: There were no regional wall motion abnormalities. Left ventricular size was normal. Overall left ventricular systolic function was normal. Estimated left ventricular ejection fraction was 60 % .     PEAK STRESS: There were no regional wall motion abnormalities. There was an appropriate reduction in left ventricular size. There was an appropriate augmentation in LV function.     ECHO CONCLUSIONS: There was no echocardiographic evidence for stress-induced ischemia. The image quality was good.      Cardiac Catheterization:    Cardiac Coronary Angiogram, Cardiac Left Heart Cath  11/27/2023    Coronary  Findings    Diagnostic  Dominance: Right    No diagnostic findings have been documented.      HOLTER MONITOR: 24 HOUR/48 HOUR MONITORS  No results found for this or any previous visit.      AMB Extended Holter Monitor: Zio XT/AT or BioTel  Results for orders placed in visit on 10/27/23    AMB extended holter monitor    Zio XT  10/01/2023 - 10/15/2023    Interpretation:    Yvon Gan MD  11/8/2023 12:19 PM EST       Please call patient. Holter was fairly normal, no obvious pathologic rhythms seen. If symptoms are not resolving ok to refer to cardiology to discuss further                 Cardiac MRI:    MRI Cardiac w wo Contrast  12/27/2023    Findings:  1. Normal left ventricular size and systolic function. Normal LV wall thickness. No gross regional wall motion abnormalities, noting artifact from ectopy limit evaluation  2. Normal right ventricular size and systolic function. No gross regional wall motion abnormalities, noting artifact from ectopy limits evaluation.  3. The aortic, mitral, and tricuspid valves open without restriction.  There is no significant valvular regurgitation, however cine MRI is inaccurate in the qualitative assessment of valvular regurgitation.  4. The left atrium is normal in size. The aortic root is normal in size.  5. There is no evidence of fibrofatty infiltration into the right ventricular myocardium.  6. Delayed post-gadolinium imaging demonstrates no region of hyperenhancement.     IMPRESSION:  Impression:  1. Normal left ventricular size and systolic function.  2. Normal right ventricular size and systolic function. No evidence of ARVD.  3. Normal bilateral atria. No valvular abnormalities.  4. No evidence of myocardial inflammation, infiltrative disease or scarring.         DEVICE CHECK:     No results found for this or any previous visit.         Code Status: [unfilled]  Advance Directive and Living Will:      Power of :    POLST:      Counseling / Coordination  of Care  Detailed discussion done with regards to change over to verapamil, implantation of Assert 6-year device      Calvin Chaves MD

## 2024-01-05 ENCOUNTER — OFFICE VISIT (OUTPATIENT)
Dept: FAMILY MEDICINE CLINIC | Facility: CLINIC | Age: 54
End: 2024-01-05
Payer: OTHER GOVERNMENT

## 2024-01-05 ENCOUNTER — OFFICE VISIT (OUTPATIENT)
Dept: GYNECOLOGY | Facility: CLINIC | Age: 54
End: 2024-01-05
Payer: OTHER GOVERNMENT

## 2024-01-05 VITALS
WEIGHT: 201.6 LBS | DIASTOLIC BLOOD PRESSURE: 80 MMHG | TEMPERATURE: 98.1 F | SYSTOLIC BLOOD PRESSURE: 126 MMHG | HEART RATE: 70 BPM | OXYGEN SATURATION: 98 % | HEIGHT: 63 IN | BODY MASS INDEX: 35.72 KG/M2

## 2024-01-05 VITALS
SYSTOLIC BLOOD PRESSURE: 112 MMHG | WEIGHT: 200.6 LBS | HEIGHT: 63 IN | BODY MASS INDEX: 35.54 KG/M2 | DIASTOLIC BLOOD PRESSURE: 74 MMHG

## 2024-01-05 DIAGNOSIS — N39.41 URGE INCONTINENCE: ICD-10-CM

## 2024-01-05 DIAGNOSIS — I20.1 VASOSPASTIC ANGINA (HCC): ICD-10-CM

## 2024-01-05 DIAGNOSIS — R10.2 PELVIC PAIN: ICD-10-CM

## 2024-01-05 DIAGNOSIS — R39.15 URINARY URGENCY: Primary | ICD-10-CM

## 2024-01-05 DIAGNOSIS — R73.01 IMPAIRED FASTING GLUCOSE: Primary | ICD-10-CM

## 2024-01-05 DIAGNOSIS — N95.1 SYMPTOMATIC MENOPAUSAL OR FEMALE CLIMACTERIC STATES: ICD-10-CM

## 2024-01-05 DIAGNOSIS — J01.10 ACUTE NON-RECURRENT FRONTAL SINUSITIS: ICD-10-CM

## 2024-01-05 DIAGNOSIS — Z01.419 WOMEN'S ANNUAL ROUTINE GYNECOLOGICAL EXAMINATION: ICD-10-CM

## 2024-01-05 DIAGNOSIS — E78.5 HYPERLIPIDEMIA, UNSPECIFIED HYPERLIPIDEMIA TYPE: ICD-10-CM

## 2024-01-05 LAB
BACTERIA UR QL AUTO: NORMAL /HPF
BILIRUB UR QL STRIP: NEGATIVE
CLARITY UR: CLEAR
COLOR UR: NORMAL
GLUCOSE UR STRIP-MCNC: NEGATIVE MG/DL
HGB UR QL STRIP.AUTO: NEGATIVE
KETONES UR STRIP-MCNC: NEGATIVE MG/DL
LEUKOCYTE ESTERASE UR QL STRIP: NEGATIVE
NITRITE UR QL STRIP: NEGATIVE
NON-SQ EPI CELLS URNS QL MICRO: NORMAL /HPF
PH UR STRIP.AUTO: 7 [PH]
PROT UR STRIP-MCNC: NEGATIVE MG/DL
RBC #/AREA URNS AUTO: NORMAL /HPF
SP GR UR STRIP.AUTO: 1.01 (ref 1–1.03)
UROBILINOGEN UR STRIP-ACNC: <2 MG/DL
WBC #/AREA URNS AUTO: NORMAL /HPF

## 2024-01-05 PROCEDURE — 87086 URINE CULTURE/COLONY COUNT: CPT | Performed by: OBSTETRICS & GYNECOLOGY

## 2024-01-05 PROCEDURE — 99386 PREV VISIT NEW AGE 40-64: CPT | Performed by: OBSTETRICS & GYNECOLOGY

## 2024-01-05 PROCEDURE — 99214 OFFICE O/P EST MOD 30 MIN: CPT | Performed by: FAMILY MEDICINE

## 2024-01-05 PROCEDURE — G0145 SCR C/V CYTO,THINLAYER,RESCR: HCPCS | Performed by: OBSTETRICS & GYNECOLOGY

## 2024-01-05 PROCEDURE — 81001 URINALYSIS AUTO W/SCOPE: CPT | Performed by: OBSTETRICS & GYNECOLOGY

## 2024-01-05 RX ORDER — AZITHROMYCIN 250 MG/1
TABLET, FILM COATED ORAL DAILY
Qty: 6 TABLET | Refills: 0 | Status: SHIPPED | OUTPATIENT
Start: 2024-01-05 | End: 2024-01-10

## 2024-01-05 RX ORDER — PREDNISONE 20 MG/1
40 TABLET ORAL DAILY
Qty: 10 TABLET | Refills: 0 | Status: SHIPPED | OUTPATIENT
Start: 2024-01-05 | End: 2024-01-10

## 2024-01-05 NOTE — PROGRESS NOTES
"Assessment/Plan   Diagnoses and all orders for this visit:    Women's annual routine gynecological examination  -     Liquid-based pap, screening    Pelvic pain    Urge incontinence    Symptomatic menopausal or female climacteric states    1. yearly exam-Pap smear done with reflex HPV, self breast awareness reviewed, calcium/vitamin D recommendations discussed, mammogram close given, colonoscopy up-to-date follow-up as per specialist.  2. pelvic discomfort-has noted this since around September 2022.  It has been mostly left lower quadrant, rarely to the right lower quadrant.  She does note urinary incontinence as noted below, to obtain testing.  She did have colonoscopy age 49, no diverticulosis was found by patient report.  She does have \"arthritis all over the body\", so she could have possible musculoskeletal pain.  Plan-check urinalysis and urine culture  -Return for ultrasound to assess for possible myoma/adnexal disease  -Could consider physical therapy for women program going forward  3. vaginal atrophy-mild changes noted on exam.  Patient has not been sexually active for around 13 years,  is older with erectile issues.  4. history of pulmonary embolism-noted on prior hormones with previous gynecologist.  5. menopausal symptoms-follows with Dr. Janett Becker.  Currently on herbal supplement with cortisol manager, oral progesterone, and testosterone cream along with a Chinese supplement.  Overall, is doing okay, she will continue to follow-up with Dr. Janett Becker.  6.  Urinary-has noted urinary leakage for years.  She does note some spontaneous leakage when arising and also urgency with incontinence.  Rarely, she will note stress symptoms if she has the urge to go and then will leak.  Nocturia about once per night.  To obtain urinalysis and urine culture and then proceed accordingly.  Could consider physical therapy for women program, referral to urogynecology as needed going forward.  7.  History " of possible endometriosis-was given this diagnosis previously.  It is unlikely that this is the cause of her pain in the postmenopausal timeframe.  To assess with ultrasound.  8.  History of //tubal ligation/endometrial ablation  9.  History of herpes-denies any current concerns.  Follow-up near future for ultrasound and office visit with me or as needed.    Subjective   Patient ID: Jessica Banda is a 53 y.o. female.    Vitals:    24 1156   BP: 112/74     Patient was seen today for new patient yearly exam.  Please see assessment plan for details.        The following portions of the patient's history were reviewed and updated as appropriate: allergies, current medications, past family history, past medical history, past social history, past surgical history, and problem list.  Past Medical History:   Diagnosis Date    Allergic rhinitis     Anxiety     Depression     Eczema     Endometriosis     Fibromyalgia     GERD (gastroesophageal reflux disease)     Gestational diabetes     Herpes     Hyperlipidemia     Menopause ovarian failure     Migraine     Miscarriage     Nasal congestion     Pulmonary embolism (HCC)     Was on hormone replacement therapy    Sleep apnea     on CPAP treatment    Sleep difficulties      Past Surgical History:   Procedure Laterality Date    BREAST BIOPSY      CARDIAC CATHETERIZATION N/A 2023    Procedure: Cardiac catheterization;  Surgeon: Keyonna Prescott DO;  Location: [facility];  Service: Cardiology    CARDIAC CATHETERIZATION N/A 2023    Procedure: Cardiac Coronary Angiogram;  Surgeon: Keyonna Prescott DO;  Location: [facility];  Service: Cardiology    CARDIAC CATHETERIZATION Left 2023    Procedure: Cardiac Left Heart Cath;  Surgeon: Keyonna Prescott DO;  Location: [facility];  Service: Cardiology    CARPAL TUNNEL RELEASE       SECTION  1991    CHOLECYSTECTOMY  2013    HEMORRHOID SURGERY   1998    KNEE SURGERY      MENISCECTOMY      TUBAL LIGATION  2007    WISDOM TOOTH EXTRACTION       OB History    Para Term  AB Living   3 2 2   1 2   SAB IAB Ectopic Multiple Live Births   1       2      # Outcome Date GA Lbr Meliton/2nd Weight Sex Delivery Anes PTL Lv   3 SAB            2 Term      Vag-Spont   ESPINOZA   1 Term      CS-Unspec   ESPINOZA       Current Outpatient Medications:     ammonium lactate (LAC-HYDRIN) 12 % cream, APPLY TOPICALLY AS NEEDED FOR DRY SKIN, Disp: 385 g, Rfl: 0    aspirin 81 mg chewable tablet, Chew 1 tablet (81 mg total) daily, Disp: 90 tablet, Rfl: 3    B Complex Vitamins (VITAMIN B COMPLEX) TABS, Take by mouth, Disp: , Rfl:     Cholecalciferol (VITAMIN D) 2000 units CAPS, Take by mouth, Disp: , Rfl:     Coenzyme Q10 10 MG capsule, Take 10 mg by mouth daily, Disp: , Rfl:     fluticasone (FLONASE) 50 mcg/act nasal spray, 1 spray into each nostril 2 (two) times a day, Disp: 1 Bottle, Rfl: 0    metoprolol succinate (TOPROL-XL) 25 mg 24 hr tablet, Take 1 tablet (25 mg total) by mouth 2 (two) times a day, Disp: 180 tablet, Rfl: 3    Multiple Vitamin (MULTIVITAMIN ADULT PO), , Disp: , Rfl:     NON FORMULARY, Adrenotone, Disp: , Rfl:     other medication, see sig,, Medication/product name: testosterone cream Strength: 4mg/ml Sig (include dose, route, frequency): apply 0.5 ml ( 2 mg) to labia daily (Patient not taking: Reported on 2023), Disp: 15 mg, Rfl: 5    pantoprazole (PROTONIX) 40 mg tablet, Take 40 mg by mouth daily, Disp: , Rfl:     Progesterone 100 MG CAPS, Take 100 mg by mouth at bedtime, Disp: 90 capsule, Rfl: 2    rosuvastatin (CRESTOR) 5 mg tablet, Take 1 tablet (5 mg total) by mouth daily, Disp: 90 tablet, Rfl: 1    Turmeric (QC TUMERIC COMPLEX PO), , Disp: , Rfl:   Allergies   Allergen Reactions    Acetazolamide Hives    Other Hives    Amoxicillin-Pot Clavulanate Hives    Iodinated Contrast Media      Needs to be prepped for IVC    Iothalamate Hives     Contrast dye     Oxycodone GI Intolerance and Vomiting    Sulfa Antibiotics Hives     Social History     Socioeconomic History    Marital status: /Civil Union     Spouse name: None    Number of children: None    Years of education: None    Highest education level: None   Occupational History    None   Tobacco Use    Smoking status: Never    Smokeless tobacco: Never   Vaping Use    Vaping status: Never Used   Substance and Sexual Activity    Alcohol use: No     Comment: SOCIAL DRINKER    Drug use: Never    Sexual activity: Not Currently     Partners: Male     Birth control/protection: Abstinence     Comment:  is not interested   Other Topics Concern    None   Social History Narrative    5 cats     Social Determinants of Health     Financial Resource Strain: Not on file   Food Insecurity: Not on file   Transportation Needs: Not on file   Physical Activity: Not on file   Stress: Not on file   Social Connections: Not on file   Intimate Partner Violence: Not on file   Housing Stability: Not on file     Family History   Problem Relation Age of Onset    Alzheimer's disease Father     Diabetes Father     Leukemia Mother     Cancer Mother         Leukemia       Review of Systems   Constitutional:  Negative for chills, diaphoresis, fatigue and fever.   Respiratory:  Negative for apnea, cough, chest tightness, shortness of breath and wheezing.    Cardiovascular:  Negative for chest pain, palpitations and leg swelling.   Gastrointestinal:  Negative for abdominal distention, abdominal pain, anal bleeding, constipation, diarrhea, nausea, rectal pain and vomiting.   Genitourinary:  Positive for pelvic pain. Negative for difficulty urinating, dyspareunia, dysuria, frequency, hematuria, menstrual problem, urgency, vaginal bleeding, vaginal discharge and vaginal pain.   Musculoskeletal:  Negative for arthralgias, back pain and myalgias.   Skin:  Negative for color change and rash.   Neurological:  Negative for dizziness, syncope,  "light-headedness, numbness and headaches.   Hematological:  Negative for adenopathy. Does not bruise/bleed easily.   Psychiatric/Behavioral:  Negative for dysphoric mood and sleep disturbance. The patient is not nervous/anxious.        Objective   Physical Exam  OBGyn Exam     Objective      /74 (BP Location: Right arm, Patient Position: Sitting, Cuff Size: Standard)   Ht 5' 3\" (1.6 m)   Wt 91 kg (200 lb 9.6 oz)   LMP 01/12/2018 (Exact Date) Comment: post menopausal x 5 yrs.  BMI 35.53 kg/m²     General:   alert and oriented, in no acute distress   Neck: normal to inspection and palpation   Breast: normal appearance, no masses or tenderness   Heart:    Lungs:    Abdomen: soft, non-tender, without masses or organomegaly   Vulva: normal   Vagina: Mildly atrophic, without erythema or lesions or discharge.   Cervix: Mildly atrophic, without lesions or discharge or cervicitis.  No CMT   Uterus: mid-position, non-tender, size consistent with 6-8 weeks   Adnexa: Mildly tender to the left greater than right.  No mass appreciated.   Rectum: Deferred, patient declined    Psych:  Normal mood and affect   Skin:  Without obvious lesions   Eyes: symmetric, with normal movements and reactivity   Musculoskeletal:  Normal muscle tone and movements appreciated       "

## 2024-01-07 LAB — BACTERIA UR CULT: NORMAL

## 2024-01-09 ENCOUNTER — CONSULT (OUTPATIENT)
Dept: CARDIOLOGY CLINIC | Facility: CLINIC | Age: 54
End: 2024-01-09
Payer: OTHER GOVERNMENT

## 2024-01-09 VITALS
HEIGHT: 63 IN | OXYGEN SATURATION: 99 % | SYSTOLIC BLOOD PRESSURE: 118 MMHG | WEIGHT: 208.8 LBS | DIASTOLIC BLOOD PRESSURE: 68 MMHG | BODY MASS INDEX: 37 KG/M2

## 2024-01-09 DIAGNOSIS — K76.0 HEPATIC STEATOSIS: ICD-10-CM

## 2024-01-09 DIAGNOSIS — K21.00 GASTROESOPHAGEAL REFLUX DISEASE WITH ESOPHAGITIS WITHOUT HEMORRHAGE: ICD-10-CM

## 2024-01-09 DIAGNOSIS — I26.99 PULMONARY EMBOLISM WITH INFARCTION (HCC): ICD-10-CM

## 2024-01-09 DIAGNOSIS — E78.5 HYPERLIPIDEMIA, UNSPECIFIED HYPERLIPIDEMIA TYPE: ICD-10-CM

## 2024-01-09 DIAGNOSIS — R73.01 IMPAIRED FASTING GLUCOSE: ICD-10-CM

## 2024-01-09 DIAGNOSIS — R00.2 PALPITATIONS: Primary | ICD-10-CM

## 2024-01-09 DIAGNOSIS — G47.30 SLEEP APNEA, UNSPECIFIED TYPE: ICD-10-CM

## 2024-01-09 DIAGNOSIS — I47.29 NSVT (NONSUSTAINED VENTRICULAR TACHYCARDIA) (HCC): ICD-10-CM

## 2024-01-09 DIAGNOSIS — E66.01 SEVERE OBESITY (BMI 35.0-39.9) WITH COMORBIDITY (HCC): ICD-10-CM

## 2024-01-09 PROBLEM — R07.9 CENTRAL CHEST PAIN: Status: RESOLVED | Noted: 2017-04-20 | Resolved: 2024-01-09

## 2024-01-09 PROBLEM — R06.09 DYSPNEA ON EXERTION: Status: RESOLVED | Noted: 2017-04-20 | Resolved: 2024-01-09

## 2024-01-09 PROCEDURE — 99245 OFF/OP CONSLTJ NEW/EST HI 55: CPT | Performed by: INTERNAL MEDICINE

## 2024-01-09 PROCEDURE — 93000 ELECTROCARDIOGRAM COMPLETE: CPT | Performed by: INTERNAL MEDICINE

## 2024-01-09 RX ORDER — VERAPAMIL HYDROCHLORIDE 40 MG/1
40 TABLET ORAL 2 TIMES DAILY
Qty: 20 TABLET | Refills: 0 | Status: SHIPPED | OUTPATIENT
Start: 2024-01-09

## 2024-01-09 NOTE — LETTER
January 9, 2024     Yvon Gan MD  Formerly [facility] [address]    Patient: Jessica Banda   YOB: 1970   Date of Visit: 1/9/2024       Dear Dr. Gan:    Thank you for referring Jessica Banda to me for evaluation. Below are my notes for this consultation.    If you have questions, please do not hesitate to call me. I look forward to following your patient along with you.         Sincerely,        Calvin Chaves MD        CC: Kristofer Ibarra DO  Jessica Banda  CARDIOLOGY SURGERY COORDINATOR    Calvin Chaves MD  1/9/2024  8:45 PM  Sign when Signing Visit   Consultation - Electrophysiology-Cardiology (EP)   Jessica Banda 53 y.o. female MRN: [id number]  Unit/Bed#:  Encounter: [id number]      1. Palpitations  Ambulatory referral to Cardiac Electrophysiology    POCT ECG    verapamil (CALAN) 40 mg tablet      2. NSVT (nonsustained ventricular tachycardia) (HCC)  Ambulatory referral to Cardiac Electrophysiology    verapamil (CALAN) 40 mg tablet      3. Hepatic steatosis        4. Sleep apnea, unspecified type        5. Gastroesophageal reflux disease with esophagitis without hemorrhage        6. Impaired fasting glucose        7. Pulmonary embolism with infarction (HCC)        8. Hyperlipidemia, unspecified hyperlipidemia type        9. Severe obesity (BMI 35.0-39.9) with comorbidity (HCC)              Consults  Physician Requesting Consult: Kristofer Ibarra DO   Reason for Consult / Principal Problem: Palpitations & NSVT (nonsustained ventricular tachycardia)          Summary of my recommendation for the patient  Patient has history of palpitations since age 38-almost 15 years  Brief, lasts for 2 to 3 seconds, patient does feel the palpitation, no episode of presyncope or syncope, falls, chest pain, or cardiac arrest    Cardiac catheterization-no significant epicardial disease  However during the study there was severe radial artery spasm and question about coronary spasm  Cardiac  MRI-no scar  Stress echocardiogram-no evidence of stress-induced wall motion abnormality    Event monitor-shows these brief episodes which look like VF  Patient was started on amlodipine, getting hypotensive and had to be discontinued  Patient has been started on metoprolol-had episodes despite being on it-it may also provoke vasospasm and hence deciding to discontinue it    Recommend using centrally acting calcium channel blockers  Will start verapamil 40 mg 2 times daily  If the patient tolerates it well will need to call in prescription through [facility] for long-term  Can also change it to long-acting verapamil 120 mg if arrhythmia is well-controlled    Patient will benefit from long-term monitoring -Recommend 6-year loop recorder-Assert by [employer]    Advised to proceed with dietary restriction and weight loss    Patient's work is extremely stressful and does provoke this palpitation-She is looking into ways to get help about that    If such abnormal rhythm increases in frequency or duration or patient has any symptoms other than palpitation-high risk symptoms like presyncope or syncope-will proceed with EP study and possible device                      Clinical conditions  Palpitations  Obesity, BMI 37  Obstructive sleep apnea on CPAP  GERD  Anxiety and depression  Hypertension  GERD        Assessment/Plan    Palpitations  Patient has history of palpitations since age 38-almost 15 years  Brief, lasts for 2 to 3 seconds, patient does feel the palpitation, no episode of presyncope or syncope, falls, chest pain, or cardiac arrest    Cardiac catheterization-no significant epicardial disease  However during the study there was severe radial artery spasm and question about coronary spasm  Cardiac MRI-no scar  Stress echocardiogram-no evidence of stress-induced wall motion abnormality    Event monitor-shows these brief episodes which look like VF  Patient was started on amlodipine, getting hypotensive and had to  be discontinued  Patient has been started on metoprolol-had episodes despite being on it-it may also provoke vasospasm and hence deciding to discontinue it    Recommend using centrally acting calcium channel blockers  Will start verapamil 40 mg 2 times daily  If the patient tolerates it well will need to call in prescription through [facility] for long-term  Can also change it to long-acting verapamil 120 mg if arrhythmia is well-controlled    Patient will benefit from long-term monitoring -Recommend 6-year loop recorder-Assert by [employer]    Advised to proceed with dietary restriction and weight loss    Patient's work is extremely stressful and does provoke this palpitation-She is looking into ways to get help about that    If such abnormal rhythm increases in frequency or duration or patient has any symptoms other than palpitation-high risk symptoms like presyncope or syncope-will proceed with EP study and possible device                  Obesity, BMI 37  Obstructive sleep apnea on CPAP  Dietary changes to lose weight       GERD  On pantoprazole     Hypertension   Well controlled           History of Present Illness  HPI: Jessica Banda is a 53 y.o. year old female has been referred to me by Dr Ibarra for management of palpitations     The patient has significant medical illnesses which include  Palpitations  Obesity, BMI 37  Obstructive sleep apnea on CPAP  GERD  Anxiety and depression  Hypertension  GERD    Patient has history of palpitations since age 38-almost 15 years  Brief, lasts for 2 to 3 seconds, patient does feel the palpitation, no episode of presyncope or syncope, falls, chest pain, or cardiac arrest    The patient is not complaining of anginal-like chest pain  There is no history of orthopnea or PND  Not complaining of presyncope or syncope  Does have some exertional intolerance    She does not abuse tobacco, alcohol or energy drinks      Historical Information  Past Medical History:   Diagnosis  Date   • Allergic rhinitis    • Anxiety    • Depression    • Eczema    • Endometriosis    • Fibromyalgia    • GERD (gastroesophageal reflux disease)    • Gestational diabetes    • Herpes    • Hyperlipidemia    • Menopause ovarian failure    • Migraine    • Miscarriage    • Nasal congestion    • Pulmonary embolism (HCC) 2015    Was on hormone replacement therapy   • Sleep apnea     on CPAP treatment   • Sleep difficulties      Past Surgical History:   Procedure Laterality Date   • BREAST BIOPSY     • CARDIAC CATHETERIZATION N/A 2023    Procedure: Cardiac catheterization;  Surgeon: Keyonna Prescott DO;  Location: [facility];  Service: Cardiology   • CARDIAC CATHETERIZATION N/A 2023    Procedure: Cardiac Coronary Angiogram;  Surgeon: Keyonna Prescott DO;  Location: [facility];  Service: Cardiology   • CARDIAC CATHETERIZATION Left 2023    Procedure: Cardiac Left Heart Cath;  Surgeon: Keyonna Prescott DO;  Location: [facility];  Service: Cardiology   • CARPAL TUNNEL RELEASE     •  SECTION  1991   • CHOLECYSTECTOMY  2013   • HEMORRHOID SURGERY  1998   • KNEE SURGERY     • MENISCECTOMY     • TUBAL LIGATION     • WISDOM TOOTH EXTRACTION       Social History     Substance and Sexual Activity   Alcohol Use No    Comment: SOCIAL DRINKER     Social History     Substance and Sexual Activity   Drug Use Never     Social History     Tobacco Use   Smoking Status Never   Smokeless Tobacco Never     Social History     Socioeconomic History   • Marital status: /Civil Union     Spouse name: Not on file   • Number of children: Not on file   • Years of education: Not on file   • Highest education level: Not on file   Occupational History   • Not on file   Tobacco Use   • Smoking status: Never   • Smokeless tobacco: Never   Vaping Use   • Vaping status: Never Used   Substance and Sexual Activity   • Alcohol use: No     Comment: SOCIAL DRINKER   •  "Drug use: Never   • Sexual activity: Not Currently     Partners: Male     Birth control/protection: Abstinence     Comment:  is not interested   Other Topics Concern   • Not on file   Social History Narrative    5 cats     Social Determinants of Health     Financial Resource Strain: Not on file   Food Insecurity: Not on file   Transportation Needs: Not on file   Physical Activity: Not on file   Stress: Not on file   Social Connections: Not on file   Intimate Partner Violence: Not on file   Housing Stability: Not on file     .  Family History:  Family History   Problem Relation Age of Onset   • Alzheimer's disease Father    • Diabetes Father    • Leukemia Mother    • Cancer Mother         Leukemia         Meds/Allergies     No current facility-administered medications for this visit.        (Not in a hospital admission)      Allergies   Allergen Reactions   • Acetazolamide Hives   • Other Hives   • Amoxicillin-Pot Clavulanate Hives   • Iodinated Contrast Media      Needs to be prepped for IVC   • Iothalamate Hives     Contrast dye   • Oxycodone GI Intolerance and Vomiting   • Sulfa Antibiotics Hives           Objective  Vitals: Visit Vitals  /68 (BP Location: Left arm, Patient Position: Sitting, Cuff Size: Standard)   Ht 5' 3\" (1.6 m)   Wt 94.7 kg (208 lb 12.8 oz)   LMP 01/12/2018 (Exact Date) Comment: post menopausal x 5 yrs.   SpO2 99%   BMI 36.99 kg/m²   OB Status Postmenopausal   Smoking Status Never   BSA 1.97 m²      Vitals:    01/09/24 0752   Weight: 94.7 kg (208 lb 12.8 oz)   [unfilled]    Invasive Devices       None                     ROS  Review of Systems   All other systems reviewed and are negative.  As described in my history of present illness        PHYSICAL EXAM  Physical Exam  Vitals reviewed.   Constitutional:       General: She is not in acute distress.     Appearance: Normal appearance. She is obese. She is not ill-appearing.   HENT:      Head: Normocephalic and atraumatic.      " Right Ear: External ear normal.      Left Ear: External ear normal.      Nose: Nose normal.      Mouth/Throat:      Comments: Posterior pharynx is crowded   Eyes:      General: No scleral icterus.     Extraocular Movements: Extraocular movements intact.      Conjunctiva/sclera: Conjunctivae normal.      Pupils: Pupils are equal, round, and reactive to light.   Neck:      Comments: Short thick neck   Cardiovascular:      Rate and Rhythm: Normal rate and regular rhythm.      Pulses: Normal pulses.      Heart sounds: Normal heart sounds. No murmur heard.  Pulmonary:      Effort: Pulmonary effort is normal. No respiratory distress.      Breath sounds: Normal breath sounds. No wheezing.   Abdominal:      General: Bowel sounds are normal. There is no distension.      Palpations: Abdomen is soft.      Tenderness: There is no abdominal tenderness.   Musculoskeletal:         General: No swelling, tenderness or deformity.      Cervical back: Neck supple. No rigidity.   Skin:     Coloration: Skin is not jaundiced.      Findings: No bruising.   Neurological:      General: No focal deficit present.      Mental Status: She is alert and oriented to person, place, and time. Mental status is at baseline.      Motor: No weakness.   Psychiatric:         Mood and Affect: Mood normal.         Behavior: Behavior normal.         Thought Content: Thought content normal.         Judgment: Judgment normal.               LAB RESULTS:    CBC:  WBC   Date Value Ref Range Status   11/18/2023 6.10 4.31 - 10.16 Thousand/uL Final     Hemoglobin   Date Value Ref Range Status   11/18/2023 13.0 11.5 - 15.4 g/dL Final     Hematocrit   Date Value Ref Range Status   11/18/2023 40.5 34.8 - 46.1 % Final     MCV   Date Value Ref Range Status   11/18/2023 92 82 - 98 fL Final     Platelets   Date Value Ref Range Status   11/18/2023 258 149 - 390 Thousands/uL Final     RBC   Date Value Ref Range Status   11/18/2023 4.41 3.81 - 5.12 Million/uL Final     MCH    Date Value Ref Range Status   11/18/2023 29.5 26.8 - 34.3 pg Final     MCHC   Date Value Ref Range Status   11/18/2023 32.1 31.4 - 37.4 g/dL Final     RDW   Date Value Ref Range Status   11/18/2023 13.1 11.6 - 15.1 % Final     MPV   Date Value Ref Range Status   11/18/2023 11.7 8.9 - 12.7 fL Final     nRBC   Date Value Ref Range Status   11/18/2023 0 /100 WBCs Final       CMP:  Potassium   Date Value Ref Range Status   11/27/2023 3.7 3.5 - 5.3 mmol/L Final     Chloride   Date Value Ref Range Status   11/27/2023 107 96 - 108 mmol/L Final     CO2   Date Value Ref Range Status   11/27/2023 27 21 - 32 mmol/L Final     BUN   Date Value Ref Range Status   11/27/2023 14 5 - 25 mg/dL Final     Creatinine   Date Value Ref Range Status   11/27/2023 0.67 0.60 - 1.30 mg/dL Final     Comment:     Standardized to IDMS reference method     Calcium   Date Value Ref Range Status   11/27/2023 9.8 8.4 - 10.2 mg/dL Final     AST   Date Value Ref Range Status   10/29/2023 19 13 - 39 U/L Final     ALT   Date Value Ref Range Status   10/29/2023 18 7 - 52 U/L Final     Comment:     Specimen collection should occur prior to Sulfasalazine administration due to the potential for falsely depressed results.      Alkaline Phosphatase   Date Value Ref Range Status   10/29/2023 73 34 - 104 U/L Final     eGFR   Date Value Ref Range Status   11/27/2023 99 ml/min/1.73sq m Final        Magnesium:   Magnesium   Date Value Ref Range Status   11/18/2023 1.8 (L) 1.9 - 2.7 mg/dL Final        A1C:  Hemoglobin A1C   Date Value Ref Range Status   12/18/2023 5.9 6.5 Final   10/19/2022 6.1 (H) <5.7 % Final     Comment:     Reference Range  Non-diabetic                     <5.7  Pre-diabetic                     5.7-6.4  Diabetic                         >=6.5  ADA target for diabetic control  <=7        TSH:  TSH 3RD GENERATON   Date Value Ref Range Status   11/18/2023 1.912 0.450 - 4.500 uIU/mL Final     Comment:     The recommended reference ranges for TSH  during pregnancy are as follows:   First trimester 0.100 to 2.500 uIU/mL   Second trimester  0.200 to 3.000 uIU/mL   Third trimester 0.300 to 3.000 uIU/m    Note: Normal ranges may not apply to patients who are transgender, non-binary, or whose legal sex, sex at birth, and gender identity differ.  Adult TSH (3rd generation) reference range follows the recommended guidelines of the American Thyroid Association, January, 2020.        PT/INR:  Protime   Date Value Ref Range Status   11/18/2023 12.4 11.6 - 14.5 seconds Final     INR   Date Value Ref Range Status   11/18/2023 0.93 0.84 - 1.19 Final       Lipid Panel:  Cholesterol   Date Value Ref Range Status   10/04/2023 261 (H) See Comment mg/dL Final     Comment:     Cholesterol:         Pediatric <18 Years        Desirable          <170 mg/dL      Borderline High    170-199 mg/dL      High               >=200 mg/dL        Adult >=18 Years            Desirable        <200 mg/dL      Borderline High  200-239 mg/dL      High             >239 mg/dL       Triglycerides   Date Value Ref Range Status   10/04/2023 307 (H) See Comment mg/dL Final     Comment:     Triglyceride:     0-9Y            <75mg/dL     10Y-17Y         <90 mg/dL       >=18Y     Normal          <150 mg/dL     Borderline High 150-199 mg/dL     High            200-499 mg/dL        Very High       >499 mg/dL    Specimen collection should occur prior to Metamizole administration due to the potential for falsely depressed results.   08/22/2013 144 <150 mg/dL Final     HDL   Date Value Ref Range Status   08/22/2013 51 > OR = 46 mg/dL Final     HDL, Direct   Date Value Ref Range Status   10/04/2023 59 >=50 mg/dL Final     NON-HDL-CHOL (CHOL-HDL)   Date Value Ref Range Status   08/22/2013 174 (H) mg/dL (calc) Final     Comment:     Result Comment: Target for non-HDL cholesterol is 30 mg/dL higher than   LDL cholesterol target.    Performed at: [facility]  [address]  [place] [address]         Troponin:  Troponin I   Date Value Ref Range Status   2020 <0.02 <=0.04 ng/mL Final     Comment:     3Autovalidation override  [employer] Chemistry analyzer 99% cutoff is > 0.04 ng/mL in network labs     o cTnI 99% cutoff is useful only when applied to patients in the clinical setting of myocardial ischemia   o cTnI 99% cutoff should be interpreted in the context of clinical history, ECG findings and possibly cardiac imaging to establish correct diagnosis.   o cTnI 99% cutoff may be suggestive but clearly not indicative of a coronary event without the clinical setting of myocardial ischemia.           Imaging:    [id number]        EKATERINA:  No results found for this or any previous visit.      Echocardiogram:  Results for orders placed during the hospital encounter of 23    Echo complete w/ contrast if indicated    Interpretation Summary  Technically fair quality, adequate transthoracic echocardiogram study.    Normal left ventricle size and systolic and diastolic function.  Left ventricle ejection fraction is estimated at 60%.  Normal right ventricle size and systolic function.  Normal left and right atrial cavity size.  Minimal aortic valve sclerosis, no aortic valve stenosis or regurgitation.  Mitral valve leaflet thickening and sclerosis and fibrocalcific changes.  No mitral stenosis.  Trace mitral valve regurgitation.  Trace tricuspid valve regurgitation.  No obvious pulmonary hypertension.  No pericardial effusion.    Compared to report of stress echocardiogram from 5/3/2017 there is no change in left ventricular function.      Stress Test:     Echo Exercise Stress Test  2017    SUMMARY     STRESS RESULTS:  Duration of exercise was 6 min and 30 sec.  Maximal work rate was 7.7 METs.  Maximal heart rate during stress was 162 bpm ( 94 % of maximal predicted heart rate).  Target heart rate was achieved.  The patient experienced chest pain during  stress; pain resolved spontaneously.     ECG CONCLUSIONS:  The stress ECG was negative for ischemia.     BASELINE:  There were no regional wall motion abnormalities.  Estimated left ventricular ejection fraction was 60 % .     PEAK STRESS:  There were no regional wall motion abnormalities.     ECHO CONCLUSIONS:  There was no echocardiographic evidence for stress-induced ischemia.     HISTORY: Dyspnea HLD, Fibromyalgia, LOLY, GERD     REST ECG: Normal sinus rhythm. Normal baseline ECG.     PROCEDURE: The study was performed in the stress lab. The study was performed in the [facility]. The procedure was explained to the patient and informed consent was obtained. Treadmill exercise testing was performed,  using the Annette protocol. Stress and rest echocardiographic evaluation was performed from multiple acoustic windows for evaluation of ventricular function.     ANNETTE PROTOCOL:  HR bpm SBP mmHg DBP mmHg Symptoms  Baseline 113 138 82 none  Stage 1 129 140 80 --  Stage 2 150 142 84 --  Stage 3 160 -- -- --  Recovery 1 114 130 72 --     MEDICATIONS GIVEN: No medications or fluids given.     STRESS RESULTS: Duration of exercise was 6 min and 30 sec. The patient exercised to protocol stage 3. Maximal work rate was 7.7 METs. Maximal heart rate during stress was 162 bpm ( 94 % of maximal predicted heart rate). Target heart rate  was achieved. The heart rate response to stress was normal. Maximal systolic blood pressure during stress was 160 mmHg. There was normal resting blood pressure with an appropriate response to stress. The rate-pressure product for the peak  heart rate and blood pressure was 00446. The patient experienced chest pain during stress; pain resolved spontaneously. The stress test was terminated due to dyspnea and fatigue.     ECG CONCLUSIONS: The stress ECG was negative for ischemia. There were no stress arrhythmias or conduction abnormalities.     STRESS 2D ECHO RESULTS:     BASELINE:  There were no regional wall motion abnormalities. Left ventricular size was normal. Overall left ventricular systolic function was normal. Estimated left ventricular ejection fraction was 60 % .     PEAK STRESS: There were no regional wall motion abnormalities. There was an appropriate reduction in left ventricular size. There was an appropriate augmentation in LV function.     ECHO CONCLUSIONS: There was no echocardiographic evidence for stress-induced ischemia. The image quality was good.      Cardiac Catheterization:    Cardiac Coronary Angiogram, Cardiac Left Heart Cath  11/27/2023    Coronary Findings    Diagnostic  Dominance: Right    No diagnostic findings have been documented.      HOLTER MONITOR: 24 HOUR/48 HOUR MONITORS  No results found for this or any previous visit.      AMB Extended Holter Monitor: Zio XT/AT or BioTel  Results for orders placed in visit on 10/27/23    AMB extended holter monitor    Zio XT  10/01/2023 - 10/15/2023    Interpretation:    Yvon Gan MD  11/8/2023 12:19 PM EST       Please call patient. Holter was fairly normal, no obvious pathologic rhythms seen. If symptoms are not resolving ok to refer to cardiology to discuss further                 Cardiac MRI:    MRI Cardiac w wo Contrast  12/27/2023    Findings:  1. Normal left ventricular size and systolic function. Normal LV wall thickness. No gross regional wall motion abnormalities, noting artifact from ectopy limit evaluation  2. Normal right ventricular size and systolic function. No gross regional wall motion abnormalities, noting artifact from ectopy limits evaluation.  3. The aortic, mitral, and tricuspid valves open without restriction.  There is no significant valvular regurgitation, however cine MRI is inaccurate in the qualitative assessment of valvular regurgitation.  4. The left atrium is normal in size. The aortic root is normal in size.  5. There is no evidence of fibrofatty infiltration into the right  ventricular myocardium.  6. Delayed post-gadolinium imaging demonstrates no region of hyperenhancement.     IMPRESSION:  Impression:  1. Normal left ventricular size and systolic function.  2. Normal right ventricular size and systolic function. No evidence of ARVD.  3. Normal bilateral atria. No valvular abnormalities.  4. No evidence of myocardial inflammation, infiltrative disease or scarring.         DEVICE CHECK:     No results found for this or any previous visit.         Code Status: [unfilled]  Advance Directive and Living Will:      Power of :    POLST:      Counseling / Coordination of Care  Detailed discussion done with regards to change over to verapamil, implantation of Assert 6-year device      Calvin Chaves MD

## 2024-01-09 NOTE — PROGRESS NOTES
Assessment/Plan:    54 y/o male with: dizziness, fatigue and sinus pressure, in the setting of pt with IFG, vasospastic angina and HLD. Will treat with Z-pack along with steroid burst. Will increase PO fluids and check labs. Discussed supportive care and return parameters.     No problem-specific Assessment & Plan notes found for this encounter.       Diagnoses and all orders for this visit:    Impaired fasting glucose  -     CBC and differential; Future  -     Comprehensive metabolic panel; Future  -     Lipid Panel with Direct LDL reflex; Future  -     TSH, 3rd generation; Future  -     T4, free; Future  -     Thyroid Antibodies Panel; Future  -     Hemoglobin A1C; Future  -     azithromycin (Zithromax) 250 mg tablet; Take 2 tablets (500 mg total) by mouth daily for 1 day, THEN 1 tablet (250 mg total) daily for 4 days.  -     predniSONE 20 mg tablet; Take 2 tablets (40 mg total) by mouth daily for 5 days    Vasospastic angina (HCC)  -     CBC and differential; Future  -     Comprehensive metabolic panel; Future  -     Lipid Panel with Direct LDL reflex; Future  -     TSH, 3rd generation; Future  -     T4, free; Future  -     Thyroid Antibodies Panel; Future  -     Hemoglobin A1C; Future  -     azithromycin (Zithromax) 250 mg tablet; Take 2 tablets (500 mg total) by mouth daily for 1 day, THEN 1 tablet (250 mg total) daily for 4 days.  -     predniSONE 20 mg tablet; Take 2 tablets (40 mg total) by mouth daily for 5 days    Hyperlipidemia, unspecified hyperlipidemia type  -     CBC and differential; Future  -     Comprehensive metabolic panel; Future  -     Lipid Panel with Direct LDL reflex; Future  -     TSH, 3rd generation; Future  -     T4, free; Future  -     Thyroid Antibodies Panel; Future  -     Hemoglobin A1C; Future  -     azithromycin (Zithromax) 250 mg tablet; Take 2 tablets (500 mg total) by mouth daily for 1 day, THEN 1 tablet (250 mg total) daily for 4 days.  -     predniSONE 20 mg tablet; Take 2  "tablets (40 mg total) by mouth daily for 5 days    Acute non-recurrent frontal sinusitis  -     azithromycin (Zithromax) 250 mg tablet; Take 2 tablets (500 mg total) by mouth daily for 1 day, THEN 1 tablet (250 mg total) daily for 4 days.  -     predniSONE 20 mg tablet; Take 2 tablets (40 mg total) by mouth daily for 5 days          Subjective:     Chief Complaint   Patient presents with    Dizziness     Patient is complaining of dizziness. Last episode was yesterday while in the car and she states she felt as though she was going to pass out. No further concerns, ng        Patient ID: Jessica Banda is a 53 y.o. female.    Patient is a 54 y/o woman who presents for follow-up on dizziness, fatigue and sinus pressure, in the setting of pt with IFG, vasospastic angina and HLD. No fevers chills nausea or vomiting.    Dizziness        The following portions of the patient's history were reviewed and updated as appropriate: allergies, current medications, past family history, past medical history, past social history, past surgical history and problem list.    Review of Systems   Constitutional: Negative.    HENT:  Positive for sinus pressure.    Eyes: Negative.    Respiratory: Negative.     Cardiovascular: Negative.    Gastrointestinal: Negative.    Endocrine: Negative.    Genitourinary: Negative.    Musculoskeletal: Negative.    Allergic/Immunologic: Negative.    Neurological:  Positive for dizziness.   Hematological: Negative.    Psychiatric/Behavioral: Negative.     All other systems reviewed and are negative.        Objective:      /80 (BP Location: Right arm, Patient Position: Sitting, Cuff Size: Large)   Pulse 70   Temp 98.1 °F (36.7 °C) (Temporal)   Ht 5' 3\" (1.6 m)   Wt 91.4 kg (201 lb 9.6 oz)   LMP 01/12/2018 (Exact Date) Comment: post menopausal x 5 yrs.  SpO2 98%   BMI 35.71 kg/m²          Physical Exam  Constitutional:       Appearance: She is well-developed.   HENT:      Head: Atraumatic.      " Right Ear: External ear normal.      Left Ear: External ear normal.   Eyes:      Conjunctiva/sclera: Conjunctivae normal.      Pupils: Pupils are equal, round, and reactive to light.   Cardiovascular:      Rate and Rhythm: Normal rate and regular rhythm.      Heart sounds: Normal heart sounds.   Pulmonary:      Effort: Pulmonary effort is normal. No respiratory distress.      Breath sounds: Normal breath sounds.   Abdominal:      General: There is no distension.      Palpations: Abdomen is soft.      Tenderness: There is no abdominal tenderness. There is no guarding or rebound.   Musculoskeletal:         General: Normal range of motion.      Cervical back: Normal range of motion.   Skin:     General: Skin is warm and dry.   Neurological:      Mental Status: She is alert and oriented to person, place, and time.      Cranial Nerves: No cranial nerve deficit.   Psychiatric:         Behavior: Behavior normal.         Thought Content: Thought content normal.         Judgment: Judgment normal.

## 2024-01-09 NOTE — PATIENT INSTRUCTIONS
1) STOP Metoprolol Succinate 25 mg    2) START Verapamil 40 mg, 1 tablet twice daily; prescription sent to pharmacy  - If tolerating with no side effects call for further refills    3) set up for ASSERT 6 yr device

## 2024-01-10 DIAGNOSIS — I47.29 NSVT (NONSUSTAINED VENTRICULAR TACHYCARDIA) (HCC): Primary | ICD-10-CM

## 2024-01-10 NOTE — TELEPHONE ENCOUNTER
Spoke with patient and she will d/c Verapamil and start Diltiazem 30 mg BID. Medication sent to pharmacy.

## 2024-01-10 NOTE — TELEPHONE ENCOUNTER
Patient called stating she was seen yesterday and she started a new medication Verapamil. She states that when she took it last night she had heart racing and pounding. She was tossing and turning last night. This morning heart racing, anxious and shaky. Her HR is 89. She does not want to take the medication this morning.

## 2024-01-11 ENCOUNTER — PREP FOR PROCEDURE (OUTPATIENT)
Dept: CARDIOLOGY CLINIC | Facility: CLINIC | Age: 54
End: 2024-01-11

## 2024-01-11 ENCOUNTER — TELEPHONE (OUTPATIENT)
Dept: CARDIOLOGY CLINIC | Facility: CLINIC | Age: 54
End: 2024-01-11

## 2024-01-11 DIAGNOSIS — R00.2 PALPITATIONS: Primary | ICD-10-CM

## 2024-01-11 LAB
LAB AP GYN PRIMARY INTERPRETATION: NORMAL
Lab: NORMAL

## 2024-01-11 NOTE — TELEPHONE ENCOUNTER
"Patient is scheduled for LOOP Recorder Implant on 1/27/24 at [facility] with /Bladimir Morocho.     Patient aware of all general instructions.    Instructions sent to patient through [facility].      Medication holds:   N/A    Blood work to be done on:  N/A  (Patient did BMP on 11/18/23 and CBC on 11/27/23)    Insurance: [employer]     Please obtain auth.         Thank you,  Yuliet \"Abeba\" Edmundo      "

## 2024-01-11 NOTE — TELEPHONE ENCOUNTER
Progress Notes by Calvin Chaves MD 1/9/2024       Can also change it to long-acting verapamil 120 mg if arrhythmia is well-controlled    Patient will benefit from long-term monitoring -Recommend 6-year loop recorder-Assert by [employer]    Advised to proceed with dietary restriction and weight loss     Can also change it to long-acting verapamil 120 mg if arrhythmia is well-controlled    Patient will benefit from long-term monitoring -Recommend 6-year loop recorder-Assert by [employer]    Advised to proceed with dietary restriction and weight loss

## 2024-01-12 ENCOUNTER — APPOINTMENT (OUTPATIENT)
Dept: LAB | Facility: MEDICAL CENTER | Age: 54
End: 2024-01-12
Payer: OTHER GOVERNMENT

## 2024-01-12 DIAGNOSIS — R73.01 IMPAIRED FASTING GLUCOSE: ICD-10-CM

## 2024-01-12 DIAGNOSIS — I20.1 VASOSPASTIC ANGINA (HCC): ICD-10-CM

## 2024-01-12 DIAGNOSIS — E78.5 HYPERLIPIDEMIA, UNSPECIFIED HYPERLIPIDEMIA TYPE: ICD-10-CM

## 2024-01-12 LAB
ALBUMIN SERPL BCP-MCNC: 4.1 G/DL (ref 3.5–5)
ALP SERPL-CCNC: 76 U/L (ref 34–104)
ALT SERPL W P-5'-P-CCNC: 21 U/L (ref 7–52)
ANION GAP SERPL CALCULATED.3IONS-SCNC: 8 MMOL/L
AST SERPL W P-5'-P-CCNC: 18 U/L (ref 13–39)
BASOPHILS # BLD AUTO: 0.05 THOUSANDS/ÂΜL (ref 0–0.1)
BASOPHILS NFR BLD AUTO: 1 % (ref 0–1)
BILIRUB SERPL-MCNC: 0.45 MG/DL (ref 0.2–1)
BUN SERPL-MCNC: 14 MG/DL (ref 5–25)
CALCIUM SERPL-MCNC: 8.9 MG/DL (ref 8.4–10.2)
CHLORIDE SERPL-SCNC: 104 MMOL/L (ref 96–108)
CHOLEST SERPL-MCNC: 176 MG/DL
CO2 SERPL-SCNC: 29 MMOL/L (ref 21–32)
CREAT SERPL-MCNC: 0.62 MG/DL (ref 0.6–1.3)
EOSINOPHIL # BLD AUTO: 0.27 THOUSAND/ÂΜL (ref 0–0.61)
EOSINOPHIL NFR BLD AUTO: 3 % (ref 0–6)
ERYTHROCYTE [DISTWIDTH] IN BLOOD BY AUTOMATED COUNT: 13 % (ref 11.6–15.1)
EST. AVERAGE GLUCOSE BLD GHB EST-MCNC: 134 MG/DL
GFR SERPL CREATININE-BSD FRML MDRD: 101 ML/MIN/1.73SQ M
GLUCOSE P FAST SERPL-MCNC: 135 MG/DL (ref 65–99)
HBA1C MFR BLD: 6.3 %
HCT VFR BLD AUTO: 45 % (ref 34.8–46.1)
HDLC SERPL-MCNC: 64 MG/DL
HGB BLD-MCNC: 14.1 G/DL (ref 11.5–15.4)
IMM GRANULOCYTES # BLD AUTO: 0.04 THOUSAND/UL (ref 0–0.2)
IMM GRANULOCYTES NFR BLD AUTO: 1 % (ref 0–2)
LDLC SERPL CALC-MCNC: 72 MG/DL (ref 0–100)
LYMPHOCYTES # BLD AUTO: 2.25 THOUSANDS/ÂΜL (ref 0.6–4.47)
LYMPHOCYTES NFR BLD AUTO: 27 % (ref 14–44)
MCH RBC QN AUTO: 29.4 PG (ref 26.8–34.3)
MCHC RBC AUTO-ENTMCNC: 31.3 G/DL (ref 31.4–37.4)
MCV RBC AUTO: 94 FL (ref 82–98)
MONOCYTES # BLD AUTO: 0.42 THOUSAND/ÂΜL (ref 0.17–1.22)
MONOCYTES NFR BLD AUTO: 5 % (ref 4–12)
NEUTROPHILS # BLD AUTO: 5.45 THOUSANDS/ÂΜL (ref 1.85–7.62)
NEUTS SEG NFR BLD AUTO: 63 % (ref 43–75)
NRBC BLD AUTO-RTO: 0 /100 WBCS
PLATELET # BLD AUTO: 246 THOUSANDS/UL (ref 149–390)
PMV BLD AUTO: 11.7 FL (ref 8.9–12.7)
POTASSIUM SERPL-SCNC: 4 MMOL/L (ref 3.5–5.3)
PROT SERPL-MCNC: 6.9 G/DL (ref 6.4–8.4)
RBC # BLD AUTO: 4.79 MILLION/UL (ref 3.81–5.12)
SODIUM SERPL-SCNC: 141 MMOL/L (ref 135–147)
T4 FREE SERPL-MCNC: 0.98 NG/DL (ref 0.61–1.12)
TRIGL SERPL-MCNC: 199 MG/DL
TSH SERPL DL<=0.05 MIU/L-ACNC: 1.92 UIU/ML (ref 0.45–4.5)
WBC # BLD AUTO: 8.48 THOUSAND/UL (ref 4.31–10.16)

## 2024-01-12 PROCEDURE — 84443 ASSAY THYROID STIM HORMONE: CPT

## 2024-01-12 PROCEDURE — 80061 LIPID PANEL: CPT

## 2024-01-12 PROCEDURE — 80053 COMPREHEN METABOLIC PANEL: CPT

## 2024-01-12 PROCEDURE — 83036 HEMOGLOBIN GLYCOSYLATED A1C: CPT

## 2024-01-12 PROCEDURE — 86376 MICROSOMAL ANTIBODY EACH: CPT

## 2024-01-12 PROCEDURE — 86800 THYROGLOBULIN ANTIBODY: CPT

## 2024-01-12 PROCEDURE — 85025 COMPLETE CBC W/AUTO DIFF WBC: CPT

## 2024-01-12 PROCEDURE — 36415 COLL VENOUS BLD VENIPUNCTURE: CPT

## 2024-01-12 PROCEDURE — 84439 ASSAY OF FREE THYROXINE: CPT

## 2024-01-13 LAB — THYROGLOB AB SERPL-ACNC: <1 IU/ML (ref 0–0.9)

## 2024-01-14 ENCOUNTER — APPOINTMENT (EMERGENCY)
Dept: RADIOLOGY | Facility: HOSPITAL | Age: 54
End: 2024-01-14
Payer: OTHER GOVERNMENT

## 2024-01-14 ENCOUNTER — HOSPITAL ENCOUNTER (EMERGENCY)
Facility: HOSPITAL | Age: 54
Discharge: HOME | End: 2024-01-14
Attending: EMERGENCY MEDICINE
Payer: OTHER GOVERNMENT

## 2024-01-14 VITALS
RESPIRATION RATE: 16 BRPM | HEART RATE: 72 BPM | WEIGHT: 203.04 LBS | DIASTOLIC BLOOD PRESSURE: 67 MMHG | TEMPERATURE: 97.3 F | OXYGEN SATURATION: 99 % | SYSTOLIC BLOOD PRESSURE: 129 MMHG | BODY MASS INDEX: 35.97 KG/M2

## 2024-01-14 DIAGNOSIS — R00.2 PALPITATIONS: Primary | ICD-10-CM

## 2024-01-14 DIAGNOSIS — R00.0 SINUS TACHYCARDIA: ICD-10-CM

## 2024-01-14 DIAGNOSIS — F41.9 ANXIETY: ICD-10-CM

## 2024-01-14 LAB
ALBUMIN SERPL BCP-MCNC: 4.3 G/DL (ref 3.5–5)
ALP SERPL-CCNC: 86 U/L (ref 34–104)
ALT SERPL W P-5'-P-CCNC: 20 U/L (ref 7–52)
ANION GAP SERPL CALCULATED.3IONS-SCNC: 6 MMOL/L
APTT PPP: 29 SECONDS (ref 23–37)
AST SERPL W P-5'-P-CCNC: 18 U/L (ref 13–39)
ATRIAL RATE: 88 BPM
BASOPHILS # BLD AUTO: 0.05 THOUSANDS/ÂΜL (ref 0–0.1)
BASOPHILS NFR BLD AUTO: 1 % (ref 0–1)
BILIRUB SERPL-MCNC: 0.6 MG/DL (ref 0.2–1)
BILIRUB UR QL STRIP: NEGATIVE
BUN SERPL-MCNC: 13 MG/DL (ref 5–25)
CALCIUM SERPL-MCNC: 9.4 MG/DL (ref 8.4–10.2)
CARDIAC TROPONIN I PNL SERPL HS: <2 NG/L
CARDIAC TROPONIN I PNL SERPL HS: <2 NG/L
CHLORIDE SERPL-SCNC: 107 MMOL/L (ref 96–108)
CLARITY UR: CLEAR
CO2 SERPL-SCNC: 28 MMOL/L (ref 21–32)
COLOR UR: YELLOW
CREAT SERPL-MCNC: 0.65 MG/DL (ref 0.6–1.3)
EOSINOPHIL # BLD AUTO: 0.1 THOUSAND/ÂΜL (ref 0–0.61)
EOSINOPHIL NFR BLD AUTO: 1 % (ref 0–6)
ERYTHROCYTE [DISTWIDTH] IN BLOOD BY AUTOMATED COUNT: 12.7 % (ref 11.6–15.1)
GFR SERPL CREATININE-BSD FRML MDRD: 100 ML/MIN/1.73SQ M
GLUCOSE SERPL-MCNC: 151 MG/DL (ref 65–140)
GLUCOSE UR STRIP-MCNC: NEGATIVE MG/DL
HCT VFR BLD AUTO: 43.1 % (ref 34.8–46.1)
HGB BLD-MCNC: 14.2 G/DL (ref 11.5–15.4)
HGB UR QL STRIP.AUTO: NEGATIVE
IMM GRANULOCYTES # BLD AUTO: 0.02 THOUSAND/UL (ref 0–0.2)
IMM GRANULOCYTES NFR BLD AUTO: 0 % (ref 0–2)
INR PPP: 0.9 (ref 0.84–1.19)
KETONES UR STRIP-MCNC: NEGATIVE MG/DL
LEUKOCYTE ESTERASE UR QL STRIP: NEGATIVE
LIPASE SERPL-CCNC: 22 U/L (ref 11–82)
LYMPHOCYTES # BLD AUTO: 1.34 THOUSANDS/ÂΜL (ref 0.6–4.47)
LYMPHOCYTES NFR BLD AUTO: 15 % (ref 14–44)
MAGNESIUM SERPL-MCNC: 2 MG/DL (ref 1.9–2.7)
MCH RBC QN AUTO: 29.8 PG (ref 26.8–34.3)
MCHC RBC AUTO-ENTMCNC: 32.9 G/DL (ref 31.4–37.4)
MCV RBC AUTO: 90 FL (ref 82–98)
MONOCYTES # BLD AUTO: 0.43 THOUSAND/ÂΜL (ref 0.17–1.22)
MONOCYTES NFR BLD AUTO: 5 % (ref 4–12)
NEUTROPHILS # BLD AUTO: 6.84 THOUSANDS/ÂΜL (ref 1.85–7.62)
NEUTS SEG NFR BLD AUTO: 78 % (ref 43–75)
NITRITE UR QL STRIP: NEGATIVE
NRBC BLD AUTO-RTO: 0 /100 WBCS
P AXIS: 48 DEGREES
PH UR STRIP.AUTO: 7 [PH] (ref 4.5–8)
PLATELET # BLD AUTO: 232 THOUSANDS/UL (ref 149–390)
PMV BLD AUTO: 10.7 FL (ref 8.9–12.7)
POTASSIUM SERPL-SCNC: 3.9 MMOL/L (ref 3.5–5.3)
PR INTERVAL: 144 MS
PROT SERPL-MCNC: 7.5 G/DL (ref 6.4–8.4)
PROT UR STRIP-MCNC: NEGATIVE MG/DL
PROTHROMBIN TIME: 12.3 SECONDS (ref 11.6–14.5)
QRS AXIS: 14 DEGREES
QRSD INTERVAL: 80 MS
QT INTERVAL: 358 MS
QTC INTERVAL: 415 MS
RBC # BLD AUTO: 4.77 MILLION/UL (ref 3.81–5.12)
SODIUM SERPL-SCNC: 141 MMOL/L (ref 135–147)
SP GR UR STRIP.AUTO: 1.01 (ref 1–1.03)
T WAVE AXIS: 34 DEGREES
THYROPEROXIDASE AB SERPL-ACNC: 11 IU/ML (ref 0–34)
UROBILINOGEN UR QL STRIP.AUTO: 0.2 E.U./DL
VENTRICULAR RATE: 81 BPM
WBC # BLD AUTO: 8.78 THOUSAND/UL (ref 4.31–10.16)

## 2024-01-14 PROCEDURE — 85730 THROMBOPLASTIN TIME PARTIAL: CPT

## 2024-01-14 PROCEDURE — 84484 ASSAY OF TROPONIN QUANT: CPT

## 2024-01-14 PROCEDURE — 36415 COLL VENOUS BLD VENIPUNCTURE: CPT

## 2024-01-14 PROCEDURE — 85610 PROTHROMBIN TIME: CPT

## 2024-01-14 PROCEDURE — 99285 EMERGENCY DEPT VISIT HI MDM: CPT

## 2024-01-14 PROCEDURE — 81003 URINALYSIS AUTO W/O SCOPE: CPT

## 2024-01-14 PROCEDURE — 83690 ASSAY OF LIPASE: CPT

## 2024-01-14 PROCEDURE — 80053 COMPREHEN METABOLIC PANEL: CPT

## 2024-01-14 PROCEDURE — 85025 COMPLETE CBC W/AUTO DIFF WBC: CPT

## 2024-01-14 PROCEDURE — 83735 ASSAY OF MAGNESIUM: CPT

## 2024-01-14 PROCEDURE — 93005 ELECTROCARDIOGRAM TRACING: CPT

## 2024-01-14 PROCEDURE — 71046 X-RAY EXAM CHEST 2 VIEWS: CPT

## 2024-01-14 RX ORDER — ONDANSETRON 2 MG/ML
4 INJECTION INTRAMUSCULAR; INTRAVENOUS ONCE
Status: DISCONTINUED | OUTPATIENT
Start: 2024-01-14 | End: 2024-01-14 | Stop reason: HOSPADM

## 2024-01-14 NOTE — Clinical Note
Jessica Banda was seen and treated in our emergency department on 1/14/2024.                Diagnosis: Increased heart rate    Jessica  may return to work on return date.    She may return on this date: 01/15/2024         If you have any questions or concerns, please don't hesitate to call.      Ulises Ramirez PA-C    ______________________________           _______________          _______________  Hospital Representative                              Date                                Time

## 2024-01-14 NOTE — ED PROVIDER NOTES
"History  Chief Complaint   Patient presents with    Rapid Heart Rate     Pt reports rapid heart rate.  Pt woke up with \"heart racing.\"  Pt started new pill for heart rate.  Pt reports dizziness and shoulder pain     Patient is a 53-year-old female with a significant past medical history of radial artery vasospasm and  palpitations.  Patient presents to the ED today with a CC of rapid heart rate.  States that she woke up this morning around 530 and felt as though her heart was racing.  She stated that she also had some tenderness in her shoulders along with some sweatiness in her palms.  She states that this has happened and usually happens at night, in the morning, or before going to work.  She denies any new stressors in her life.  She states that she just started taking a new medication diltazem.  She has a significant past surgical history of cardiac catherization where she experienced a radial artery vasospasm.  She states that she has some chest pain in her axilla of the left side radiating to the left latissimus.  She states that this is reproducible.  She also states that she has some dizziness, nausea, and is nervous.  Patient states that she has bilateral shoulder pain that is chronic in nature.  Patient denies any abdominal pain, vomiting, diarrhea, urinary changes, diaphoresis, visual disturbance, decreased appetite, life changes, shortness of breath.        Prior to Admission Medications   Prescriptions Last Dose Informant Patient Reported? Taking?   B Complex Vitamins (VITAMIN B COMPLEX) TABS  Self Yes No   Sig: Take by mouth   Cholecalciferol (VITAMIN D) 2000 units CAPS  Self Yes No   Sig: Take by mouth   Coenzyme Q10 10 MG capsule  Self Yes No   Sig: Take 10 mg by mouth daily   Multiple Vitamin (MULTIVITAMIN ADULT PO)  Self Yes No   NON FORMULARY  Self Yes No   Sig: Adrenotone   Patient not taking: Reported on 1/5/2024   Progesterone 100 MG CAPS  Self No No   Sig: Take 100 mg by mouth at bedtime "   Turmeric (QC TUMERIC COMPLEX PO)  Self Yes No   ammonium lactate (LAC-HYDRIN) 12 % cream  Self No No   Sig: APPLY TOPICALLY AS NEEDED FOR DRY SKIN   aspirin 81 mg chewable tablet  Self No No   Sig: Chew 1 tablet (81 mg total) daily   diltiazem (CARDIZEM) 30 mg tablet   No No   Sig: Take 1 tablet (30 mg total) by mouth 2 (two) times a day   fluticasone (FLONASE) 50 mcg/act nasal spray  Self No No   Si spray into each nostril 2 (two) times a day   other medication, see sig,  Self No No   Sig: Medication/product name: testosterone cream  Strength: 4mg/ml  Sig (include dose, route, frequency): apply 0.5 ml ( 2 mg) to labia daily   Patient not taking: Reported on 2023   pantoprazole (PROTONIX) 40 mg tablet  Self Yes No   Sig: Take 40 mg by mouth daily   rosuvastatin (CRESTOR) 5 mg tablet  Self No No   Sig: Take 1 tablet (5 mg total) by mouth daily   verapamil (CALAN) 40 mg tablet   No No   Sig: Take 1 tablet (40 mg total) by mouth 2 (two) times a day      Facility-Administered Medications: None       Past Medical History:   Diagnosis Date    Allergic rhinitis     Anxiety     Depression     Eczema     Endometriosis     Fibromyalgia     GERD (gastroesophageal reflux disease)     Gestational diabetes     Herpes     Hyperlipidemia     Menopause ovarian failure     Migraine     Miscarriage     Nasal congestion     Pulmonary embolism (HCC)     Was on hormone replacement therapy    Sleep apnea     on CPAP treatment    Sleep difficulties        Past Surgical History:   Procedure Laterality Date    BREAST BIOPSY      CARDIAC CATHETERIZATION N/A 2023    Procedure: Cardiac catheterization;  Surgeon: Keyonna Prescott DO;  Location: [facility];  Service: Cardiology    CARDIAC CATHETERIZATION N/A 2023    Procedure: Cardiac Coronary Angiogram;  Surgeon: Keyonna Prescott DO;  Location: [facility];  Service: Cardiology    CARDIAC CATHETERIZATION Left 2023    Procedure:  Cardiac Left Heart Cath;  Surgeon: Keyonna Prescott DO;  Location: [facility];  Service: Cardiology    CARPAL TUNNEL RELEASE       SECTION  1991    CHOLECYSTECTOMY  2013    HEMORRHOID SURGERY  1998    KNEE SURGERY      MENISCECTOMY      TUBAL LIGATION  2007    WISDOM TOOTH EXTRACTION         Family History   Problem Relation Age of Onset    Alzheimer's disease Father     Diabetes Father     Leukemia Mother     Cancer Mother         Leukemia     I have reviewed and agree with the history as documented.    E-Cigarette/Vaping    E-Cigarette Use Never User      E-Cigarette/Vaping Substances    Nicotine No     THC No     CBD No     Flavoring No     Other No     Unknown No      Social History     Tobacco Use    Smoking status: Never    Smokeless tobacco: Never   Vaping Use    Vaping status: Never Used   Substance Use Topics    Alcohol use: No     Comment: SOCIAL DRINKER    Drug use: Never       Review of Systems   Constitutional:  Negative for chills, diaphoresis, fatigue and fever.   HENT:  Positive for ear pain and sinus pressure. Negative for congestion, ear discharge, rhinorrhea, sinus pain and sore throat.    Eyes:  Negative for pain, discharge, itching and visual disturbance.   Respiratory:  Negative for cough and shortness of breath.    Cardiovascular:  Positive for chest pain. Negative for palpitations.   Gastrointestinal:  Negative for abdominal pain and vomiting.   Genitourinary:  Positive for frequency. Negative for difficulty urinating, dysuria and hematuria.   Musculoskeletal:  Positive for arthralgias and myalgias. Negative for back pain, neck pain and neck stiffness.   Skin:  Negative for color change and rash.   Neurological:  Positive for dizziness. Negative for seizures, syncope, weakness, light-headedness, numbness and headaches.   Psychiatric/Behavioral:  The patient is nervous/anxious.    All other systems reviewed and are negative.      Physical Exam  Physical Exam  Vitals  and nursing note reviewed.   Constitutional:       General: She is not in acute distress.     Appearance: She is well-developed. She is not ill-appearing, toxic-appearing or diaphoretic.   HENT:      Head: Normocephalic and atraumatic.      Right Ear: Tympanic membrane, ear canal and external ear normal.      Left Ear: Tympanic membrane, ear canal and external ear normal.      Nose: Nose normal.      Mouth/Throat:      Mouth: Mucous membranes are moist.   Eyes:      Extraocular Movements: Extraocular movements intact.      Conjunctiva/sclera: Conjunctivae normal.      Pupils: Pupils are equal, round, and reactive to light.   Cardiovascular:      Rate and Rhythm: Normal rate and regular rhythm.      Pulses: Normal pulses.      Heart sounds: Normal heart sounds. No murmur heard.  Pulmonary:      Effort: Pulmonary effort is normal. No respiratory distress.      Breath sounds: Normal breath sounds.   Abdominal:      General: Bowel sounds are normal. There is no distension.      Palpations: Abdomen is soft.      Tenderness: There is no abdominal tenderness. There is no right CVA tenderness or left CVA tenderness.   Musculoskeletal:         General: No swelling or tenderness. Normal range of motion.      Cervical back: Neck supple.      Right lower leg: No edema.      Left lower leg: No edema.   Skin:     General: Skin is warm and dry.      Capillary Refill: Capillary refill takes less than 2 seconds.   Neurological:      Mental Status: She is alert and oriented to person, place, and time.      Cranial Nerves: No cranial nerve deficit.   Psychiatric:         Mood and Affect: Mood normal.         Vital Signs  ED Triage Vitals   Temperature Pulse Respirations Blood Pressure SpO2   01/14/24 0611 01/14/24 0611 01/14/24 0611 01/14/24 0611 01/14/24 0611   (!) 97.3 °F (36.3 °C) 98 16 149/64 99 %      Temp src Heart Rate Source Patient Position - Orthostatic VS BP Location FiO2 (%)   -- 01/14/24 0645 01/14/24 0611 01/14/24 0611  --    Monitor Sitting Right arm       Pain Score       01/14/24 0611       10 - Worst Possible Pain           Vitals:    01/14/24 0611 01/14/24 0645 01/14/24 0959   BP: 149/64 156/74 129/67   Pulse: 98 76 72   Patient Position - Orthostatic VS: Sitting Sitting Sitting         Visual Acuity      ED Medications  Medications - No data to display    Diagnostic Studies  Results Reviewed       Procedure Component Value Units Date/Time    HS Troponin I 2hr [[id number]] Collected: 01/14/24 1008    Lab Status: Final result Specimen: Blood from Hand, Left Updated: 01/14/24 1038     hs TnI 2hr <2 ng/L      Delta 2hr hsTnI --    HS Troponin 0hr (reflex protocol) [[id number]]  (Normal) Collected: 01/14/24 0812    Lab Status: Final result Specimen: Blood from Arm, Left Updated: 01/14/24 0843     hs TnI 0hr <2 ng/L     Comprehensive metabolic panel [[id number]]  (Abnormal) Collected: 01/14/24 0812    Lab Status: Final result Specimen: Blood from Arm, Left Updated: 01/14/24 0836     Sodium 141 mmol/L      Potassium 3.9 mmol/L      Chloride 107 mmol/L      CO2 28 mmol/L      ANION GAP 6 mmol/L      BUN 13 mg/dL      Creatinine 0.65 mg/dL      Glucose 151 mg/dL      Calcium 9.4 mg/dL      AST 18 U/L      ALT 20 U/L      Alkaline Phosphatase 86 U/L      Total Protein 7.5 g/dL      Albumin 4.3 g/dL      Total Bilirubin 0.60 mg/dL      eGFR 100 ml/min/1.73sq m     Narrative:      National Kid[employer] guidelines for Chronic Kidney Disease (CKD):     Stage 1 with normal or high GFR (GFR > 90 mL/min/1.73 square meters)    Stage 2 Mild CKD (GFR = 60-89 mL/min/1.73 square meters)    Stage 3A Moderate CKD (GFR = 45-59 mL/min/1.73 square meters)    Stage 3B Moderate CKD (GFR = 30-44 mL/min/1.73 square meters)    Stage 4 Severe CKD (GFR = 15-29 mL/min/1.73 square meters)    Stage 5 End Stage CKD (GFR <15 mL/min/1.73 square meters)  Note: GFR calculation is accurate only with a steady state creatinine    Lipase [[id number]]  (Normal)  Collected: 01/14/24 0812    Lab Status: Final result Specimen: Blood from Arm, Left Updated: 01/14/24 0836     Lipase 22 u/L     Magnesium [[id number]]  (Normal) Collected: 01/14/24 0812    Lab Status: Final result Specimen: Blood from Arm, Left Updated: 01/14/24 0836     Magnesium 2.0 mg/dL     Protime-INR [[id number]]  (Normal) Collected: 01/14/24 0812    Lab Status: Final result Specimen: Blood from Arm, Left Updated: 01/14/24 0831     Protime 12.3 seconds      INR 0.90    APTT [[id number]]  (Normal) Collected: 01/14/24 0812    Lab Status: Final result Specimen: Blood from Arm, Left Updated: 01/14/24 0831     PTT 29 seconds     CBC and differential [[id number]]  (Abnormal) Collected: 01/14/24 0812    Lab Status: Final result Specimen: Blood from Arm, Left Updated: 01/14/24 0821     WBC 8.78 Thousand/uL      RBC 4.77 Million/uL      Hemoglobin 14.2 g/dL      Hematocrit 43.1 %      MCV 90 fL      MCH 29.8 pg      MCHC 32.9 g/dL      RDW 12.7 %      MPV 10.7 fL      Platelets 232 Thousands/uL      nRBC 0 /100 WBCs      Neutrophils Relative 78 %      Immat GRANS % 0 %      Lymphocytes Relative 15 %      Monocytes Relative 5 %      Eosinophils Relative 1 %      Basophils Relative 1 %      Neutrophils Absolute 6.84 Thousands/µL      Immature Grans Absolute 0.02 Thousand/uL      Lymphocytes Absolute 1.34 Thousands/µL      Monocytes Absolute 0.43 Thousand/µL      Eosinophils Absolute 0.10 Thousand/µL      Basophils Absolute 0.05 Thousands/µL     Urine Macroscopic, POC [[id number]] Collected: 01/14/24 0819    Lab Status: Final result Specimen: Urine Updated: 01/14/24 0820     Color, UA Yellow     Clarity, UA Clear     pH, UA 7.0     Leukocytes, UA Negative     Nitrite, UA Negative     Protein, UA Negative mg/dl      Glucose, UA Negative mg/dl      Ketones, UA Negative mg/dl      Urobilinogen, UA 0.2 E.U./dl      Bilirubin, UA Negative     Occult Blood, UA Negative     Specific Gravity, UA 1.015    Narrative:      CLINITEK  RESULT                   XR chest 2 views    (Results Pending)              Procedures  ECG 12 Lead Documentation Only    Date/Time: 1/14/2024 9:09 AM    Performed by: Ulises Ramirez PA-C  Authorized by: Ulises Ramirez PA-C    Indications / Diagnosis:  Increase Heart rate  ECG reviewed by me, the ED Provider: yes    Patient location:  ED  Previous ECG:     Previous ECG:  Compared to current    Comparison ECG info:  Shortened QT  Interpretation:     Interpretation: abnormal    Rate:     ECG rate:  81    ECG rate assessment: normal    Rhythm:     Rhythm: sinus rhythm    Ectopy:     Ectopy: none    QRS:     QRS axis:  Normal  Conduction:     Conduction: normal    ST segments:     ST segments:  Normal  T waves:     T waves: non-specific    Comments:      Sinus rhythm with sinus arrhythmia           ED Course             HEART Risk Score      Flowsheet Row Most Recent Value   Heart Score Risk Calculator    History 2 Filed at: 01/14/2024 1041   ECG 1 Filed at: 01/14/2024 1041   Age 1 Filed at: 01/14/2024 1041   Risk Factors 1 Filed at: 01/14/2024 1041   Troponin 0 Filed at: 01/14/2024 1041   HEART Score 5 Filed at: 01/14/2024 1041                          SBIRT 20yo+      Flowsheet Row Most Recent Value   Initial Alcohol Screen: US AUDIT-C     1. How often do you have a drink containing alcohol? 0 Filed at: 01/14/2024 0635   2. How many drinks containing alcohol do you have on a typical day you are drinking?  0 Filed at: 01/14/2024 0635   3a. Male UNDER 65: How often do you have five or more drinks on one occasion? 0 Filed at: 01/14/2024 0635   3b. FEMALE Any Age, or MALE 65+: How often do you have 4 or more drinks on one occassion? 0 Filed at: 01/14/2024 0635   Audit-C Score 0 Filed at: 01/14/2024 0635   AVNI: How many times in the past year have you...    Used an illegal drug or used a prescription medication for non-medical reasons? Never Filed at: 01/14/2024 0635                      Medical Decision  Making  Patient 53-year-old female who presents to the ED with a chief complaint of rapid heart rate.  Physical exam positive for reproducible axilla pain on the left side.  Cardiopulmonary exam was benign.  Patient worked up for ACS.  EKG/troponin/CBC CMP/lipase/UA.  EKG normal showing no signs of acute ischemia.  Troponin was less than 2.  Due to the patient's long history of cardiac pain to her collected.  2-hour troponin less than 2.  Baseline labs were unremarkable.  Also stated having some slight epigastric pain so lipase ordered which was also negative.  Chest x-ray showed no acute cardiopulmonary abnormalities interpreted by this writer.  Patient also stated that when the initial feeling of any rapid heart rate occurred she hyperfocus on the issue and she stated that she feels like that has made it worse.  Discussed with the patient that even though this may be true feeling of having the rapid heart rate was valid and coming to the ER was the right decision.  Patient has an upcoming procedure where she will get a loop recorder to trace any rhythm issues.  With negative cardiac workup patient's rapid heartbeat likely due to palpations or anxiety.  Patient understood diagnosis and treatment plan and had no further questions.  Patient felt much more comfortable after knowing there was no acute cardiac issues going on.  Patient's heart score is 5 so ambulatory referral to cardiology is made.  Patient was told to call her PCP to make follow-up visit for today's visit.  Patient was told to return to the ED with any chest pain, palpitations, tachycardia, shortness of breath, diaphoresis, extremity numbness/pain.  Patient was discharged in stable condition. Proper follow ups were explained     Ddx-palpitations, anxiety, ACS, vasospasm, tachycardia, arrhythmia, electrolyte abnormality    Amount and/or Complexity of Data Reviewed  Labs: ordered.  Radiology: ordered and independent interpretation performed.      Details: No acute cardiopulmonary disease     Risk  Prescription drug management.             Disposition  Final diagnoses:   Palpitations   Anxiety   Sinus tachycardia     Time reflects when diagnosis was documented in both MDM as applicable and the Disposition within this note       Time User Action Codes Description Comment    1/14/2024  9:11 AM Ulises Ramirez Add [R00.0] Tachycardia     1/14/2024  9:12 AM Ulises Ramirez Remove [R00.0] Tachycardia     1/14/2024  9:12 AM Ulises Ramirez Add [R00.2] Palpitations     1/14/2024  9:12 AM Ulises Ramirez Add [F41.9] Anxiety     1/14/2024  9:30 AM Ulises Ramirez Add [R00.0] Sinus tachycardia           ED Disposition       ED Disposition   Discharge    Condition   Stable    Date/Time   Sun Jan 14, 2024 0911    Comment   Jessica Banda discharge to home/self care.                   Follow-up Information       Follow up With Specialties Details Why Contact Info    Yvon Gan MD Family Medicine Call today  [address] [address]  [phone]      Kristofer Ibarra DO Cardiology   [address] [address]  [phone]              Discharge Medication List as of 1/14/2024 10:43 AM        CONTINUE these medications which have NOT CHANGED    Details   ammonium lactate (LAC-HYDRIN) 12 % cream APPLY TOPICALLY AS NEEDED FOR DRY SKIN, Starting Thu 5/11/2023, Normal      aspirin 81 mg chewable tablet Chew 1 tablet (81 mg total) daily, Starting Fri 11/17/2023, Until Thu 2/15/2024, No Print      B Complex Vitamins (VITAMIN B COMPLEX) TABS Take by mouth, Historical Med      Cholecalciferol (VITAMIN D) 2000 units CAPS Take by mouth, Historical Med      Coenzyme Q10 10 MG capsule Take 10 mg by mouth daily, Starting Mon 10/3/2022, Historical Med      diltiazem (CARDIZEM) 30 mg tablet Take 1 tablet (30 mg total) by mouth 2 (two) times a day, Starting Wed 1/10/2024, Normal      fluticasone (FLONASE) 50 mcg/act nasal spray 1 spray  into each nostril 2 (two) times a day, Starting Wed 10/9/2019, Normal      Multiple Vitamin (MULTIVITAMIN ADULT PO) Historical Med      NON FORMULARY Adrenotone, Historical Med      other medication, see sig, Medication/product name: testosterone cream  Strength: 4mg/ml  Sig (include dose, route, frequency): apply 0.5 ml ( 2 mg) to labia daily, Phone In      pantoprazole (PROTONIX) 40 mg tablet Take 40 mg by mouth daily, Historical Med      Progesterone 100 MG CAPS Take 100 mg by mouth at bedtime, Starting Thu 6/8/2023, Normal      rosuvastatin (CRESTOR) 5 mg tablet Take 1 tablet (5 mg total) by mouth daily, Starting Thu 10/5/2023, Normal      Turmeric (QC TUMERIC COMPLEX PO) Historical Med      verapamil (CALAN) 40 mg tablet Take 1 tablet (40 mg total) by mouth 2 (two) times a day, Starting Tue 1/9/2024, Normal                 PDMP Review         Value Time User    PDMP Reviewed  Yes 3/10/2023  4:01 PM Yvon Gan MD            ED Provider  Electronically Signed by             Ulises Ramirez PA-C  01/15/24 0623

## 2024-01-14 NOTE — ED NOTES
Attempt with ultrasound for IV access x3, unsuccessful. Primary RN aware.      Madhuri Conteh RN  01/14/24 0806

## 2024-01-14 NOTE — DISCHARGE INSTRUCTIONS
Patient is advised to call PCP for today's ED visit.  Patient advised to follow-up with cardiology for today's ED visit.  Patient should continue to take scheduled medications as prescribed.  Patient use over-the-counter Tylenol for pain as needed per OTC dosing recommendations.  Patient should return to the ED with any episodes of increased heart rate, chest pain, shortness of breath, diaphoresis, numbness tingling, abdominal pain.

## 2024-01-15 ENCOUNTER — PATIENT MESSAGE (OUTPATIENT)
Dept: FAMILY MEDICINE CLINIC | Facility: CLINIC | Age: 54
End: 2024-01-15

## 2024-01-15 DIAGNOSIS — E27.9 ADRENAL GLAND DYSFUNCTION (HCC): Primary | ICD-10-CM

## 2024-01-15 LAB
ATRIAL RATE: 73 BPM
P AXIS: 26 DEGREES
PR INTERVAL: 138 MS
QRS AXIS: 16 DEGREES
QRSD INTERVAL: 80 MS
QT INTERVAL: 368 MS
QTC INTERVAL: 405 MS
T WAVE AXIS: 25 DEGREES
VENTRICULAR RATE: 73 BPM

## 2024-01-16 NOTE — RESULT ENCOUNTER NOTE
Please call patient. Labs are stable, cholesterol improved dramatically, sugar worsened. Will discuss more fully at next visit.

## 2024-01-23 ENCOUNTER — OFFICE VISIT (OUTPATIENT)
Dept: FAMILY MEDICINE CLINIC | Facility: CLINIC | Age: 54
End: 2024-01-23
Payer: OTHER GOVERNMENT

## 2024-01-23 VITALS
WEIGHT: 203 LBS | HEIGHT: 63 IN | DIASTOLIC BLOOD PRESSURE: 80 MMHG | TEMPERATURE: 97.6 F | BODY MASS INDEX: 35.97 KG/M2 | SYSTOLIC BLOOD PRESSURE: 120 MMHG | HEART RATE: 97 BPM | OXYGEN SATURATION: 98 %

## 2024-01-23 DIAGNOSIS — E78.5 HYPERLIPIDEMIA, UNSPECIFIED HYPERLIPIDEMIA TYPE: ICD-10-CM

## 2024-01-23 DIAGNOSIS — R73.03 PREDIABETES: Primary | ICD-10-CM

## 2024-01-23 DIAGNOSIS — E66.01 SEVERE OBESITY (BMI 35.0-39.9) WITH COMORBIDITY (HCC): ICD-10-CM

## 2024-01-23 PROCEDURE — 99214 OFFICE O/P EST MOD 30 MIN: CPT | Performed by: FAMILY MEDICINE

## 2024-01-25 NOTE — DISCHARGE INSTR - AVS FIRST PAGE
LOOP RECORDER INSTRUCTIONS:  - Keep loop recorder incision dry for one week. Do not use lotions, powders, creams, or ointments on incision.     - Remove outer bandage 24-48 hours after procedure. There is waterproof glue present over the incision. This should keep the incision dry. Avoid picking at the glue or peeling it off. The glue will come off in its own time.     - Because the glue is waterproof, it is safe to shower if the glue remains on over the incision. It is ok to let the soap and water gently run over the incision. Avoid direct exposure to the stream of water. Do not soak the incision. Do not scrub. Pat dry.    - If the glue comes off in less that 7 days, place a waterproof bandage over the incision before showering.    - If present, leave underlying steri-strips in place. They will either fall off on their own or will be removed at the 2 week follow up appointment. If present, leave stitches in place. They will be removed at the 2 week follow up appointment.    - Please call the office if you notice redness, swelling, bleeding, or drainage from incision or if you develop fevers.      Cardiac Loop Recorder Insertion      WHAT YOU SHOULD KNOW:    A cardiac loop recorder is a device used to diagnose heart rhythm problems, such as a fast or irregular heartbeat. It is implanted in your left chest, just under the skin. The device records a pattern of your heart's rhythm, called an EKG. Your device records automatic EKGs, depending on how your caregiver programs it. You may also receive a handheld controller. You press a button on the controller when you have symptoms, such as dizziness, lightheadedness, or palpitations. The device will record an EKG at that moment. The recording can help your caregiver see if your symptoms may be caused by heart rhythm problems. Your caregiver will remove the device after it has collected enough data. You may need the device for up to 3 years. The procedure to remove the  device is similar to the procedure used to implant it.      AFTER YOU LEAVE:    Follow up with your cardiologist as directed: You will need to present to the office in 2 weeks. A nurse at the device clinic will check your incision and remove any stitches or steri strips that may be present. They may also program your device settings again. They will retrieve data from the device every 1 to 3 months with a monitor held over your skin. You may be able to transmit data from your device from home as well. You will do this by calling a number provided by the device clinic or as they have instructed you. Ask for information about this process. Write down your questions so you remember to ask them during your visits.      Wound care: Keep loop recorder incision dry for one week. Do not use lotions, powders, creams, or ointments on incision. Remove outer bandage 24-48 hours after procedure. There is waterproof glue present over the incision. This should keep the incision dry. Avoid picking at the glue or peeling it off. The glue will come off in its own time. Because the glue is waterproof, it is safe to shower if the glue remains on over the incision. It is ok to let the soap and water gently run over the incision. Avoid direct exposure to the stream of water. Do not soak the incision. Do not scrub. Pat dry.If the glue comes off in less that 7 days, place a waterproof bandage over the incision before showering. If present, leave underlying steri-strips in place. They will either fall off on their own or will be removed at the 2 week follow up appointment. If present, leave stitches in place. They will be removed at the 2 week follow up appointment.    Please call the office if you notice redness, swelling, bleeding, or drainage from incision or if you develop fevers.    Return to activity: If you received anesthesia, you will not be able to drive for 24 hours. Otherwise, most people can return to normal activities soon  after the procedure. Your cardiologist may want to know if your work involves electrical current or high-voltage equipment. Ask about other electrical items that could interfere with your cardiac loop recorder.      Contact your cardiologist if:   You have a fever or chills.    Your wound is red, swollen, or draining pus.    You have questions or concerns about your condition or care.    Seek care immediately or call 911 if:   You feel weak, dizzy, or faint.    You lose consciousness.      © 2014 [employer]. Information is for End User's use only and may not be sold, redistributed or otherwise used for commercial purposes. All illustrations and images included in CareNotes® are the copyrighted property of [employer], [employer]. or [employer].    The above information is an  only. It is not intended as medical advice for individual conditions or treatments. Talk to your doctor, nurse or pharmacist before following any medical regimen to see if it is safe and effective for you.

## 2024-01-25 NOTE — H&P
Patient presents to [facility] for loop recorder implantation.  Patient has history of palpitations over the last 15 years which are brief lasting 2 to 3 seconds however patient does get symptomatic with these.  She has had significant workup with cardiac MRI cardiac catheterization and stress echocardiogram which have not shown any significant findings.  Patient had event monitor placed which showed brief episodes of what looked to be VF.  She was seen by Dr. Chaves at 1/9/2024 and recommended for long-term monitor with loop recorder implantation for which she presents today.

## 2024-01-25 NOTE — PROGRESS NOTES
"Assessment/Plan:    52 y/o woman with: severe obesity with comorbidity, HLD and IFG. Reviewed recent labs, continue meds. Discussed supportive care and return parameters.     No problem-specific Assessment & Plan notes found for this encounter.       Diagnoses and all orders for this visit:    Prediabetes    Severe obesity (BMI 35.0-39.9) with comorbidity (HCC)    Hyperlipidemia, unspecified hyperlipidemia type          Subjective:     Chief Complaint   Patient presents with    Follow-up     Review lab work , med management     Dizziness     X1 week     Rash     Back lump bump , bleeds         Patient ID: Jessica Banda is a 53 y.o. female.    Pt is a 52 y/o woman who presents for follow-up on recent labs severe obesity with comorbidity, HLD and IFG. Pt admits she continues to make lifestyle changes she admits being stable on meds. No fevers chills nausea or vomiting.    Dizziness  Associated symptoms include a rash.   Rash        The following portions of the patient's history were reviewed and updated as appropriate: allergies, current medications, past family history, past medical history, past social history, past surgical history and problem list.    Review of Systems   Constitutional: Negative.    HENT: Negative.     Eyes: Negative.    Respiratory: Negative.     Cardiovascular: Negative.    Gastrointestinal: Negative.    Endocrine: Negative.    Genitourinary: Negative.    Musculoskeletal: Negative.    Skin:  Positive for rash.   Allergic/Immunologic: Negative.    Neurological:  Positive for dizziness.   Hematological: Negative.    Psychiatric/Behavioral: Negative.     All other systems reviewed and are negative.        Objective:      /80   Pulse 97   Temp 97.6 °F (36.4 °C)   Ht 5' 3\" (1.6 m)   Wt 92.1 kg (203 lb)   LMP 01/12/2018 (Exact Date) Comment: post menopausal x 5 yrs.  SpO2 98%   BMI 35.96 kg/m²          Physical Exam  Constitutional:       Appearance: She is well-developed.   HENT:      " Head: Atraumatic.      Right Ear: External ear normal.      Left Ear: External ear normal.   Eyes:      Conjunctiva/sclera: Conjunctivae normal.      Pupils: Pupils are equal, round, and reactive to light.   Cardiovascular:      Rate and Rhythm: Normal rate and regular rhythm.      Heart sounds: Normal heart sounds.   Pulmonary:      Effort: Pulmonary effort is normal. No respiratory distress.      Breath sounds: Normal breath sounds.   Abdominal:      General: There is no distension.      Palpations: Abdomen is soft.      Tenderness: There is no abdominal tenderness. There is no guarding or rebound.   Musculoskeletal:         General: Normal range of motion.      Cervical back: Normal range of motion.   Skin:     General: Skin is warm and dry.   Neurological:      Mental Status: She is alert and oriented to person, place, and time.      Cranial Nerves: No cranial nerve deficit.   Psychiatric:         Behavior: Behavior normal.         Thought Content: Thought content normal.         Judgment: Judgment normal.

## 2024-01-27 ENCOUNTER — HOSPITAL ENCOUNTER (OUTPATIENT)
Facility: HOSPITAL | Age: 54
Setting detail: OUTPATIENT SURGERY
Discharge: HOME | End: 2024-01-27
Attending: INTERNAL MEDICINE | Admitting: INTERNAL MEDICINE
Payer: OTHER GOVERNMENT

## 2024-01-27 VITALS
BODY MASS INDEX: 35.97 KG/M2 | OXYGEN SATURATION: 97 % | TEMPERATURE: 97.9 F | WEIGHT: 203 LBS | RESPIRATION RATE: 18 BRPM | DIASTOLIC BLOOD PRESSURE: 60 MMHG | SYSTOLIC BLOOD PRESSURE: 127 MMHG | HEIGHT: 63 IN | HEART RATE: 97 BPM

## 2024-01-27 DIAGNOSIS — R00.2 PALPITATIONS: ICD-10-CM

## 2024-01-27 PROBLEM — I47.29 NSVT (NONSUSTAINED VENTRICULAR TACHYCARDIA) (HCC): Status: ACTIVE | Noted: 2024-01-27

## 2024-01-27 PROBLEM — Z98.890 HISTORY OF LOOP RECORDER: Status: ACTIVE | Noted: 2024-01-27

## 2024-01-27 PROCEDURE — C1764 EVENT RECORDER, CARDIAC: HCPCS | Performed by: PHYSICIAN ASSISTANT

## 2024-01-27 PROCEDURE — NC001 PR NO CHARGE: Performed by: PHYSICIAN ASSISTANT

## 2024-01-27 PROCEDURE — 33285 INSJ SUBQ CAR RHYTHM MNTR: CPT | Performed by: PHYSICIAN ASSISTANT

## 2024-01-27 DEVICE — LOOP RECORDER REVEAL LINQ II SYS DEVICE ONLY: Type: IMPLANTABLE DEVICE | Site: CHEST  WALL | Status: FUNCTIONAL

## 2024-01-27 RX ORDER — LIDOCAINE HYDROCHLORIDE AND EPINEPHRINE 10; 10 MG/ML; UG/ML
INJECTION, SOLUTION INFILTRATION; PERINEURAL CODE/TRAUMA/SEDATION MEDICATION
Status: DISCONTINUED | OUTPATIENT
Start: 2024-01-27 | End: 2024-01-27 | Stop reason: HOSPADM

## 2024-01-27 RX ORDER — LIDOCAINE HYDROCHLORIDE AND EPINEPHRINE 10; 10 MG/ML; UG/ML
INJECTION, SOLUTION INFILTRATION; PERINEURAL
Status: DISCONTINUED
Start: 2024-01-27 | End: 2024-01-27 | Stop reason: HOSPADM

## 2024-01-31 DIAGNOSIS — I47.29 NSVT (NONSUSTAINED VENTRICULAR TACHYCARDIA) (HCC): ICD-10-CM

## 2024-02-09 ENCOUNTER — IN-CLINIC DEVICE VISIT (OUTPATIENT)
Dept: CARDIOLOGY CLINIC | Facility: CLINIC | Age: 54
End: 2024-02-09
Payer: OTHER GOVERNMENT

## 2024-02-09 DIAGNOSIS — Z95.818 PRESENCE OF OTHER CARDIAC IMPLANTS AND GRAFTS: Primary | ICD-10-CM

## 2024-02-09 PROCEDURE — 93291 INTERROG DEV EVAL SCRMS IP: CPT | Performed by: INTERNAL MEDICINE

## 2024-02-09 NOTE — PROGRESS NOTES
Results for orders placed or performed in visit on 02/09/24   Cardiac EP device report    Narrative    DEVICE INTERROGATED IN THE [place] OFFICE. LOOP: WOUND CHECK: INCISION CLEAN AND DRY WITH EDGES APPROXIMATED; GLUE & SUTURES REMOVED; WOUND CARE AND RESTRICTIONS REVIEWED WITH PATIENT. (PDF # 2).  BATTERY VOLTAGE OK. PRESENTING RHYTHM: NSR @ 80 BPM. R-WAVES: 0.27mV. 1 SYMPTOM 1/31 W/ EGM SHOWING NSR @ 70-80 BPM. PT STATED SHE FELT A FLUTTER SENSATION WHILE @ REST. PT TAKES CARDIZEM. EF: 60% (ECHO 11/9/23). NO OTHER PATIENT OR DEVICE ACTIVATED EPISODES. NO PROGRAMMING CHANGES MADE TO DEVICE PARAMETERS. NORMAL DEVICE FUNCTION. CH

## 2024-02-13 NOTE — PROGRESS NOTES
AMB US Pelvic Non OB    Date/Time: 2/15/2024 11:00 AM    Performed by: Bernice Martínez  Authorized by: Marvin Allen MD  Universal Protocol:  Consent: Verbal consent obtained.  Consent given by: patient  Patient understanding: patient states understanding of the procedure being performed  Patient identity confirmed: verbally with patient    Procedure details:     SIS Procedure: No    Technique:  Transvaginal US, Non-OB    Position: lithotomy exam    Uterine findings:     Length (cm): 6.88    Height (cm):  3.98    Width (cm):  5.42    Endometrial stripe: identified      Endometrium thickness (mm):  2.9  Left ovary findings:     Left ovary:  Visualized    Length (cm): 2.5    Height (cm): 1.33    Width (cm): 1.33  Right ovary findings:     Right ovary:  Visualized    Length (cm): 2.61    Height (cm): 1.58    Width (cm): 1.66  Other findings:     Free pelvic fluid: not identified      Free peritoneal fluid: not identified    Post-Procedure Details:     Impression:  Retroverted uterus demonstrates a stable posterior fibroid 1.3cm. The bilateral ovaries appear within normal limits. No free fluid.     Tolerance:  Tolerated well, no immediate complications    Complications: no complications    Additional Procedure Comments:      [employer] F8 E8C-RS transvaginal transducer Serial # [id number] was used to perform the examination today and subsequently followed with high level disinfection utilizing Trophon EPR procedure.     Ultrasound performed at:     [facility]  [facility] [address], [place] [address]  Phone:  [phone]  Fax:  [phone]

## 2024-02-14 ENCOUNTER — CLINICAL SUPPORT (OUTPATIENT)
Dept: NUTRITION | Facility: HOSPITAL | Age: 54
End: 2024-02-14
Payer: OTHER GOVERNMENT

## 2024-02-14 VITALS — BODY MASS INDEX: 36.24 KG/M2 | WEIGHT: 204.6 LBS

## 2024-02-14 DIAGNOSIS — R63.4 WEIGHT LOSS: ICD-10-CM

## 2024-02-14 DIAGNOSIS — R73.03 PREDIABETES: ICD-10-CM

## 2024-02-14 NOTE — PROGRESS NOTES
" Nutrition Assessment Form    Patient Name: Jessica Banda    YOB: 1970    Sex: Female     Assessment Date: 2/14/2024  Start Time: 8 am Stop Time: 9 am Total Minutes: 60     Data:  Present at session: self   Parent/Patient Concerns/reason for visit: \"Specifically here for prediabetes\"   Medical Dx/Reason for Referral: E78.00, E78.5, R73.03  Hyperlipidemia, hypercholesterolemia, prediabetes   Past Medical History:   Diagnosis Date    Allergic     Allergic rhinitis     Anxiety     Arthritis     Depression     Eczema     Endometriosis     Fibromyalgia     GERD (gastroesophageal reflux disease)     Gestational diabetes 2000    Herpes 1996    Hyperlipidemia     Hypertension     Menopause ovarian failure     Migraine     Miscarriage     Nasal congestion     NSVT (nonsustained ventricular tachycardia) (Formerly [facility]) 01/27/2024    Obesity     Pulmonary embolism (Former[facility]) 2015    Was on hormone replacement therapy    s/p [employer] loop recorder 1/27/2024 01/27/2024    Sleep apnea     on CPAP treatment    Sleep difficulties     Tachycardia     Vasospasm (Former[facility])        Current Outpatient Medications   Medication Sig Dispense Refill    ammonium lactate (LAC-HYDRIN) 12 % cream APPLY TOPICALLY AS NEEDED FOR DRY SKIN 385 g 0    azelastine (ASTELIN) 0.1 % nasal spray 1 spray into each nostril 2 (two) times a day Use in each nostril as directed 90 mL 5    B Complex Vitamins (VITAMIN B COMPLEX) TABS Take by mouth      Cholecalciferol (VITAMIN D) 2000 units CAPS Take by mouth      Coenzyme Q10 10 MG capsule Take 10 mg by mouth daily      diltiazem (CARDIZEM) 30 mg tablet Take 1 tablet (30 mg total) by mouth 2 (two) times a day 180 tablet 3    fluticasone (FLONASE) 50 mcg/act nasal spray 1 spray into each nostril 2 (two) times a day 1 Bottle 0    Multiple Vitamin (MULTIVITAMIN ADULT PO)       pantoprazole (PROTONIX) 40 mg tablet Take 40 mg by mouth daily      Progesterone 100 MG CAPS Take 100 mg by mouth at bedtime 90 capsule 2    " "rosuvastatin (CRESTOR) 5 mg tablet Take 1 tablet (5 mg total) by mouth daily 90 tablet 1    Turmeric (QC TUMERIC COMPLEX PO)        No current facility-administered medications for this visit.        Additional Meds/Supplements:    Special Learning Needs/barriers to learning/any new barriers    Height: HC Readings from Last 5 Encounters:   No data found for HC      Weight: Wt Readings from Last 10 Encounters:   01/27/24 92.1 kg (203 lb)   01/23/24 92.1 kg (203 lb)   01/22/24 92.1 kg (203 lb)   01/14/24 92.1 kg (203 lb 0.7 oz)   01/09/24 94.7 kg (208 lb 12.8 oz)   01/05/24 91.4 kg (201 lb 9.6 oz)   01/05/24 91 kg (200 lb 9.6 oz)   12/27/23 92.1 kg (203 lb)   12/18/23 93.4 kg (206 lb)   12/04/23 91.2 kg (201 lb)     Estimated body mass index is 35.96 kg/m² as calculated from the following:    Height as of 1/27/24: 5' 3\" (1.6 m).    Weight as of 1/27/24: 92.1 kg (203 lb).   Recent Weight Change: [x]Yes     []No  Amount:  5 lb weight loxx X 1 month intentional      Energy Needs: No calculations performed for this visit   Allergies   Allergen Reactions    Acetazolamide Hives    Other Hives    Amoxicillin-Pot Clavulanate Hives    Iodinated Contrast Media      Needs to be prepped for IVC    Iothalamate Hives     Contrast dye    Oxycodone GI Intolerance and Vomiting    Sulfa Antibiotics Hives    or intolerances    Social History     Substance and Sexual Activity   Alcohol Use No    Comment: SOCIAL DRINKER    _no ETOH   Social History     Tobacco Use   Smoking Status Never   Smokeless Tobacco Never       Who shops? 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 [x]Yes     []No  When: 25 years ago      Why: Gestational DM        Diet Hx:  Breakfast: 3/4 cup 4% cottage cheese, 1/2 cup strawberries  1/2 cup strawberries (no protein)  1 cup strawberries Diet:   Lunch: larger meal or may skip  Egg white egg salad with keto bread   Larger salad with chicken  May have leftovers      Beverage: 4-8 16 oz bottles water per day     Dinner:   small salad with lettuce and dressing  Chinese- lo mein, pork fried rice (1/2 cup total volume)  Sal[employer]- half soup (vegetable soup)/ half salad spring mix, carrots, broccoli, cucumbers, chicken or turkey  [employer] taco salad or beef tacos          Snacks: AM -   PM - apple, 3 clementies  HS -    Other Notes/ Initial Assessment:  Lost ~40 lb intentionally (started losing ~October 2022 into last summer 2023) by watching what she eats, goes to the gym regularly. MD feels prediabetes has a genetic component   Had partial right knee replacement last year   Knows that candy is a weakness and cut it out over a year ago. Will get sugar free candy for the sweetness.     RD discussed importance of regular meal patterns balanced with ~ 30-45 g CHO per meal (3 meals a day), 60 g protein per day  (aim for ~20 g per meal) to control BG.  Provided examples of sources of starchy and non-starchy vegetables and their standard serving sizes  Discussed the plate method of portioning foods, including half a plate fruits and vegetables or a half plate all vegetables, 1/4 of the plate a lean protein source or meat, and a 1/4 of the plate being a whole grain carb- usually 1/2-1c.  This should be followed for at least 2 meals of the day, but could also be followed for all 3.   Encouraged patient avoid skipping meals  Discussed role of soluble fiber in lowering LDL and encouraged patient to maintain level of activity to maintain healthy HDL.  Encouraged maintaining  a diet low in added sugars/ refined grains and saturated fats for healthy CHOL    Updated assessment (Follow up note only):     Nutrition Diagnosis:   Food and nutrition related knowledge deficit  related to Lack of prior exposure to accurate nutrition related information as evidenced by Verbalizes inaccurate or incomplete information       Any change or new dx since previous visit:     Nutrition Diagnosis:   N/a      Medical Nutrition Therapy Intervention:  [x]Individualized Meal Plan 30-45 g CHO per meal (3 meals a day), 60 g protein per day  [x]Understanding Lab Values LDL under 100, HDL over 40   []Basic Pathophysiology of Disease []Food/Medication Interactions   []Food Diary []Exercise   []Lifestyle/Behavior Modification Techniques []Medication, Mechanism of Action   [x]Label Reading: CHO/ added sugar  []Self Blood Glucose Monitoring   []Weight/BMI Goals: gain/lose/maintain []Other -           Comprehension: []Excellent  []Very Good  [x]Good  []Fair   []Poor    Receptivity: []Excellent  []Very Good  [x]Good  []Fair   []Poor    Expected Compliance: []Excellent  []Very Good  [x]Good  []Fair   []Poor        Goals (initial)/ Progress made on previous goals/new goals:  Patient will balance meals with 30-45 g CHO per meal (3 meals a day), 60 g protein per day by next follow up   2.Patient will choose foods with less than 10 g added sugar per serving by next follow up   3.       No follow-ups on file.  Labs:  CMP  Lab Results   Component Value Date    K 3.9 01/14/2024     01/14/2024    CO2 28 01/14/2024    BUN 13 01/14/2024    CREATININE 0.65 01/14/2024    GLUF 135 (H) 01/12/2024    CALCIUM 9.4 01/14/2024    AST 18 01/14/2024    ALT 20 01/14/2024    ALKPHOS 86 01/14/2024    EGFR 100 01/14/2024       BMP  Lab Results   Component Value Date    CALCIUM 9.4 01/14/2024    K 3.9 01/14/2024    CO2 28 01/14/2024     01/14/2024    BUN 13 01/14/2024    CREATININE 0.65 01/14/2024       Lipids  Lab Results   Component  "Value Date    CHOL 225 (H) 08/22/2013     Lab Results   Component Value Date    HDL 64 01/12/2024    HDL 59 10/04/2023    HDL 51 08/22/2013     Lab Results   Component Value Date    LDLCALC 72 01/12/2024    LDLCALC 141 (H) 10/04/2023     Lab Results   Component Value Date    TRIG 199 (H) 01/12/2024    TRIG 307 (H) 10/04/2023    TRIG 144 08/22/2013     No results found for: \"CHOLHDL\"    Hemoglobin A1C  Lab Results   Component Value Date    HGBA1C 6.3 (H) 01/12/2024       Fasting Glucose  Lab Results   Component Value Date    GLUF 135 (H) 01/12/2024       Insulin     Thyroid  No results found for: \"TSH\", \"C3VILHT\", \"G8NCUTD\", \"THYROIDAB\"    Hepatic Function Panel  Lab Results   Component Value Date    ALT 20 01/14/2024    AST 18 01/14/2024    ALKPHOS 86 01/14/2024       Celiac Disease Antibody Panel  No results found for: \"ENDOMYSIAL IGA\", \"GLIADIN IGA\", \"GLIADIN IGG\", \"IGA\", \"TISSUE TRANSGLUT AB\", \"TTG IGA\"   Iron  No results found for: \"IRON\", \"TIBC\", \"FERRITIN\"         Traci Vuong RD  [facility]IN[facility]UTR[facility]  [address] [facility] [id number]    "

## 2024-02-15 ENCOUNTER — OFFICE VISIT (OUTPATIENT)
Dept: GYNECOLOGY | Facility: CLINIC | Age: 54
End: 2024-02-15
Payer: OTHER GOVERNMENT

## 2024-02-15 ENCOUNTER — ULTRASOUND (OUTPATIENT)
Dept: GYNECOLOGY | Facility: CLINIC | Age: 54
End: 2024-02-15
Payer: OTHER GOVERNMENT

## 2024-02-15 VITALS
HEIGHT: 63 IN | WEIGHT: 204 LBS | SYSTOLIC BLOOD PRESSURE: 114 MMHG | BODY MASS INDEX: 36.14 KG/M2 | DIASTOLIC BLOOD PRESSURE: 74 MMHG

## 2024-02-15 DIAGNOSIS — N39.41 URGE INCONTINENCE: ICD-10-CM

## 2024-02-15 DIAGNOSIS — D25.9 UTERINE LEIOMYOMA, UNSPECIFIED LOCATION: ICD-10-CM

## 2024-02-15 DIAGNOSIS — R10.2 PELVIC PAIN: Primary | ICD-10-CM

## 2024-02-15 PROCEDURE — 99213 OFFICE O/P EST LOW 20 MIN: CPT | Performed by: OBSTETRICS & GYNECOLOGY

## 2024-02-15 PROCEDURE — 76830 TRANSVAGINAL US NON-OB: CPT | Performed by: OBSTETRICS & GYNECOLOGY

## 2024-02-15 NOTE — PROGRESS NOTES
Assessment/Plan   Diagnoses and all orders for this visit:    Pelvic pain    Urge incontinence    Uterine leiomyoma, unspecified location    1. pelvic discomfort-has noted this since around 2022.  Continues to be mostly in the left lower quadrant rarely to the right lower quadrant.  She does seem to note some association with abdominal bloating with bowel movements.  She also can note significant symptoms with full bladder with urgency and incontinence.  Notes nocturia about once per night.  -Recent urinalysis/urine culture was unremarkable  -Had colonoscopy 4 years ago which was unremarkable without diverticulosis by patient report  -Pelvic ultrasound today stable without any adnexal findings, stable 1.3 cm posterior myoma in the fundal area.  Discussed options for care.  Suggest that she might consider following up with her colonoscopy DrMarion Given the association with bowel movements.  Given her association with incontinence and urgency symptoms, suggest follow-up with urogynecology.  Reviewing her prior note from  at [facility], urogynecology referral was recommended at that time and I agree with this plan.  Did discuss pelvic floor therapy with physical therapy for women program.  She will defer at this time.  2.  Vaginal atrophy-mild changes noted on previous exam.  She has not been sexually active for around 13 years as her  is older with erectile issues.  She denies any complaints.  3. history of pulmonary embolism-noted on hormones with prior gynecologist  4. menopausal symptoms-continues to follow-up with Dr. Janett Becker.  Please see previous note for treatment plan.  5. history of possible endometriosis-was given this diagnosis.  No focal findings noted on ultrasound  6.  History of //tubal ligation/endometrial ablation  7.  History of herpes-denies any current concerns  8.  Uterine myoma-1.3 cm fundal myoma noted today on ultrasound, unchanged from previous  ultrasound February 2020.  Given the size and stability, highly unlikely to be causing pain.  Follow-up January 2025 for yearly exam or as needed.    Subjective   Patient ID: Jessica Banda is a 53 y.o. female.    Vitals:    02/15/24 1103   BP: 114/74     Patient was seen today for follow-up visit with ultrasound.  Please see assessment plan for details and ultrasound note.        The following portions of the patient's history were reviewed and updated as appropriate: allergies, current medications, past family history, past medical history, past social history, past surgical history, and problem list.  Past Medical History:   Diagnosis Date    Allergic     Allergic rhinitis     Anxiety     Arthritis     Depression     Eczema     Endometriosis     Fibromyalgia     GERD (gastroesophageal reflux disease)     Gestational diabetes 2000    Herpes 1996    Hyperlipidemia     Hypertension     Menopause ovarian failure     Migraine     Miscarriage     Nasal congestion     NSVT (nonsustained ventricular tachycardia) ([facility]) 01/27/2024    Obesity     Pulmonary embolism ([facility]) 2015    Was on hormone replacement therapy    s/p [employer] loop recorder 1/27/2024 01/27/2024    Sleep apnea     on CPAP treatment    Sleep difficulties     Tachycardia     Vasospasm ([facility])      Past Surgical History:   Procedure Laterality Date    BREAST BIOPSY  2008    CARDIAC CATHETERIZATION N/A 11/27/2023    Procedure: Cardiac catheterization;  Surgeon: Keyonna Prescott DO;  Location: [facility];  Service: Cardiology    CARDIAC CATHETERIZATION N/A 11/27/2023    Procedure: Cardiac Coronary Angiogram;  Surgeon: Keyonna Prescott DO;  Location: [facility];  Service: Cardiology    CARDIAC CATHETERIZATION Left 11/27/2023    Procedure: Cardiac Left Heart Cath;  Surgeon: Keyonna Prescott DO;  Location: [facility];  Service: Cardiology    CARDIAC ELECTROPHYSIOLOGY PROCEDURE N/A 1/27/2024    Procedure: Cardiac loop recorder  implant;  Surgeon: Caren Dumont PA-C;  Location: [facility];  Service: Cardiology    CARPAL TUNNEL RELEASE       SECTION  1991    CHOLECYSTECTOMY  2013    HEMORRHOID SURGERY  1998    KNEE SURGERY      MENISCECTOMY      TUBAL LIGATION      WISDOM TOOTH EXTRACTION       OB History    Para Term  AB Living   3 2 2   1 2   SAB IAB Ectopic Multiple Live Births   1       2      # Outcome Date GA Lbr Meliton/2nd Weight Sex Delivery Anes PTL Lv   3 SAB            2 Term      Vag-Spont   ESPINOZA   1 Term      CS-Unspec   ESPINOZA       Current Outpatient Medications:     ammonium lactate (LAC-HYDRIN) 12 % cream, APPLY TOPICALLY AS NEEDED FOR DRY SKIN, Disp: 385 g, Rfl: 0    azelastine (ASTELIN) 0.1 % nasal spray, 1 spray into each nostril 2 (two) times a day Use in each nostril as directed, Disp: 90 mL, Rfl: 5    B Complex Vitamins (VITAMIN B COMPLEX) TABS, Take by mouth, Disp: , Rfl:     Cholecalciferol (VITAMIN D) 2000 units CAPS, Take by mouth, Disp: , Rfl:     Coenzyme Q10 10 MG capsule, Take 10 mg by mouth daily, Disp: , Rfl:     diltiazem (CARDIZEM) 30 mg tablet, Take 1 tablet (30 mg total) by mouth 2 (two) times a day, Disp: 180 tablet, Rfl: 3    fluticasone (FLONASE) 50 mcg/act nasal spray, 1 spray into each nostril 2 (two) times a day, Disp: 1 Bottle, Rfl: 0    Multiple Vitamin (MULTIVITAMIN ADULT PO), , Disp: , Rfl:     pantoprazole (PROTONIX) 40 mg tablet, Take 40 mg by mouth daily, Disp: , Rfl:     Progesterone 100 MG CAPS, Take 100 mg by mouth at bedtime, Disp: 90 capsule, Rfl: 2    rosuvastatin (CRESTOR) 5 mg tablet, Take 1 tablet (5 mg total) by mouth daily, Disp: 90 tablet, Rfl: 1    Turmeric (QC TUMERIC COMPLEX PO), , Disp: , Rfl:   Allergies   Allergen Reactions    Acetazolamide Hives    Other Hives    Amoxicillin-Pot Clavulanate Hives    Iodinated Contrast Media      Needs to be prepped for IVC    Iothalamate Hives     Contrast dye    Oxycodone GI Intolerance and  "Vomiting    Sulfa Antibiotics Hives     Social History     Socioeconomic History    Marital status: /Civil Union     Spouse name: None    Number of children: None    Years of education: None    Highest education level: None   Occupational History    None   Tobacco Use    Smoking status: Never    Smokeless tobacco: Never   Vaping Use    Vaping status: Never Used   Substance and Sexual Activity    Alcohol use: Not Currently    Drug use: Never    Sexual activity: Not Currently     Partners: Male     Birth control/protection: Abstinence     Comment:  is not interested   Other Topics Concern    None   Social History Narrative    5 cats     Social Determinants of Health     Financial Resource Strain: Not on file   Food Insecurity: Not on file   Transportation Needs: Not on file   Physical Activity: Not on file   Stress: Not on file   Social Connections: Not on file   Intimate Partner Violence: Not on file   Housing Stability: Not on file     Family History   Problem Relation Age of Onset    Alzheimer's disease Father     Diabetes Father     Leukemia Mother     Cancer Mother         Leukemia       Review of Systems    Objective   Physical Exam  OBGyn Exam     Objective      /74 (BP Location: Right arm, Patient Position: Sitting, Cuff Size: Standard)   Ht 5' 3\" (1.6 m)   Wt 92.5 kg (204 lb)   LMP 01/12/2018 (Exact Date) Comment: post menopausal x 5 yrs.  BMI 36.14 kg/m²     General:   alert and oriented, in no acute distress   Neck:    Breast:    Heart:    Lungs:    Abdomen: Nontender   Vulva:    Vagina:    Cervix:    Uterus:    Adnexa:    Rectum:     Psych:  Normal mood and affect   Skin:  Without obvious lesions   Eyes: symmetric, with normal movements and reactivity   Musculoskeletal:  Normal muscle tone and movements appreciated         "

## 2024-02-21 PROBLEM — J01.10 ACUTE NON-RECURRENT FRONTAL SINUSITIS: Status: RESOLVED | Noted: 2018-06-04 | Resolved: 2024-02-21

## 2024-02-21 PROBLEM — J01.90 ACUTE SINUSITIS: Status: RESOLVED | Noted: 2020-03-20 | Resolved: 2024-02-21

## 2024-02-21 PROBLEM — A08.4 VIRAL GASTROENTERITIS: Status: RESOLVED | Noted: 2019-04-17 | Resolved: 2024-02-21

## 2024-02-21 PROBLEM — J06.9 ACUTE UPPER RESPIRATORY INFECTION: Status: RESOLVED | Noted: 2018-10-31 | Resolved: 2024-02-21

## 2024-03-06 DIAGNOSIS — N95.1 MENOPAUSAL SYMPTOMS: ICD-10-CM

## 2024-03-06 RX ORDER — PROGESTERONE 100 MG/1
100 CAPSULE ORAL
Qty: 90 CAPSULE | Refills: 2 | Status: CANCELLED | OUTPATIENT
Start: 2024-03-06

## 2024-03-06 NOTE — TELEPHONE ENCOUNTER
Reason for call:   [x] Refill   [] Prior Auth  [] Other:     Office:   [] PCP/Provider -   [x] Specialty/Provider - obgyn      Medication: Progesterone 100 MG CAPS     Take 100 mg by mouth at bedtime      Dose/Frequency:     Quantity: #90    Pharmacy: [facility] - [address] [phone]     Does the patient have enough for 3 days?   [x] Yes   [] No - Send as HP to POD

## 2024-03-13 ENCOUNTER — TELEPHONE (OUTPATIENT)
Dept: FAMILY MEDICINE CLINIC | Facility: CLINIC | Age: 54
End: 2024-03-13

## 2024-03-13 DIAGNOSIS — R73.9 ELEVATED BLOOD SUGAR: Primary | ICD-10-CM

## 2024-03-13 NOTE — TELEPHONE ENCOUNTER
Regarding: Pre-Diabetes  Contact: [phone]  ----- Message from Karolina Ojeda sent at 3/11/2024  1:01 PM EDT -----       ----- Message from Jessica Banda to Yvon Gan MD sent at 3/11/2024  9:39 AM -----   Dr. Gan, at our last visit we spoke about going on something for my pre-diabetes? I have been watching everything I eat and sometimes after I eat I feel very dizzy and very tired and I test my sugar level and it has been 170. I am excersing everyday and reading all labels. I do try to eat little meals throughout the day but I get very nervous that I am going to have that feeling of my sugar being high. I think I would like to start on the lowest dose of the metephorman that you mentioned in my last appt with you. are there any side affects that would hinder with my heart medicine that I am taking?

## 2024-03-15 DIAGNOSIS — E78.5 HYPERLIPIDEMIA, UNSPECIFIED HYPERLIPIDEMIA TYPE: ICD-10-CM

## 2024-03-15 RX ORDER — ROSUVASTATIN CALCIUM 5 MG/1
5 TABLET, COATED ORAL DAILY
Qty: 90 TABLET | Refills: 1 | Status: SHIPPED | OUTPATIENT
Start: 2024-03-15

## 2024-03-19 ENCOUNTER — TELEPHONE (OUTPATIENT)
Age: 54
End: 2024-03-19

## 2024-03-19 NOTE — TELEPHONE ENCOUNTER
Patient called back regarding rx denial for Progesterone. Reviewed order & verified being sent to correct Pharmacy - [facility]. Appears 2 refills remain, not  but [facility] informed the pt they do not have a new or valid rx. Pt has been trying to resolve this since 3/6. Open encounter with clinical. Connected pt with Rx refill representative Sienna for further assistance during time of call.

## 2024-03-19 NOTE — TELEPHONE ENCOUNTER
Pt requesting a call back from office nurse or Dr. Zabala as she states she got a notification on [employer] that her refill request was denied.

## 2024-03-19 NOTE — TELEPHONE ENCOUNTER
Jessica PAK -  Janett Becker    Patient is calling to check on her medication, she hasn't heard anything from the OBGYN, it was ordered 2 times per the patient.    She only has 9 Left    Patient might need a few to go to her [facility] pharmacy, she doesn't want to run out she feels very bad with out them.     Medication: Progesterone 100 MG CAPS      Take 100 mg by mouth at bedtime     Quantity: #90     Pharmacy: [facility] - [address] [phone]     Please call patient when medication is ordered.    CB: [phone]

## 2024-03-20 ENCOUNTER — OFFICE VISIT (OUTPATIENT)
Dept: FAMILY MEDICINE CLINIC | Facility: CLINIC | Age: 54
End: 2024-03-20
Payer: OTHER GOVERNMENT

## 2024-03-20 VITALS
BODY MASS INDEX: 35.08 KG/M2 | TEMPERATURE: 97.3 F | HEART RATE: 67 BPM | DIASTOLIC BLOOD PRESSURE: 86 MMHG | HEIGHT: 63 IN | SYSTOLIC BLOOD PRESSURE: 124 MMHG | OXYGEN SATURATION: 98 % | WEIGHT: 198 LBS

## 2024-03-20 DIAGNOSIS — R73.03 PREDIABETES: ICD-10-CM

## 2024-03-20 DIAGNOSIS — E66.01 SEVERE OBESITY (BMI 35.0-39.9) WITH COMORBIDITY (HCC): Primary | ICD-10-CM

## 2024-03-20 PROCEDURE — 99213 OFFICE O/P EST LOW 20 MIN: CPT | Performed by: FAMILY MEDICINE

## 2024-03-20 RX ORDER — DILTIAZEM HYDROCHLORIDE 180 MG/1
CAPSULE, COATED, EXTENDED RELEASE ORAL
COMMUNITY
Start: 2024-01-10

## 2024-03-21 ENCOUNTER — TELEPHONE (OUTPATIENT)
Age: 54
End: 2024-03-21

## 2024-03-21 DIAGNOSIS — N95.1 MENOPAUSAL SYMPTOMS: ICD-10-CM

## 2024-03-21 RX ORDER — PROGESTERONE 100 MG/1
100 CAPSULE ORAL
Qty: 90 CAPSULE | Refills: 2 | Status: SHIPPED | OUTPATIENT
Start: 2024-03-21 | End: 2024-03-28 | Stop reason: RX

## 2024-03-21 NOTE — TELEPHONE ENCOUNTER
Pt called in to follow up on the refill, and pt is requesting a callback from practice due to situation of refilling medication.

## 2024-03-21 NOTE — TELEPHONE ENCOUNTER
Patient called back regarding Progesterone refill request. [facility] advised pt they still have not received the prescription. Due to ongoing issue, connected with Lisy in office clinical; was advised she will review with Elaine & follow up with the pt to resolve. Patient verbalized understanding.

## 2024-03-21 NOTE — TELEPHONE ENCOUNTER
"Pt has been having trouble getting a medication refill of progesterone from her hormone therapy doctor. She has been requesting this medication since March 6th and so far she just keeps getting told that it's pending doctor signature. Pt is running low on the medication now. Pt does not wish to go to their practice anymore due to the \"lack of communication\" and the fact that it's been so long without hearing anything. She would like a more reliable office. Please recommend a new off that the patient can go to. If possible can PCP assist with getting pt progesterone refilled in meantime.  "

## 2024-03-25 NOTE — PROGRESS NOTES
Assessment/Plan:    54 y/o woman with: severe obesity with comorbidity and IFG. Will continue current treatment. Discussed calorie / nutrition tracking and encouraged increasing liquids with electrolytes. Discussed supportive care and return parameters.     No problem-specific Assessment & Plan notes found for this encounter.       Diagnoses and all orders for this visit:    Severe obesity (BMI 35.0-39.9) with comorbidity (HCC)    Prediabetes    Other orders  -     diltiazem (CARDIZEM CD) 180 mg 24 hr capsule;  (Patient not taking: Reported on 3/20/2024)          Subjective:     Chief Complaint   Patient presents with    Blood Sugar Problem     Pt states no matter what she eats gets dizzy and her sugar goes up and drops low and wants to discuss more about metformin before she decides to start talking it and has other medication ands otc questions no other problems or concerns chivo         Patient ID: Jessica Banda is a 53 y.o. female.    Patient is a 54 y/o woman who presents for follow-up on severe obesity with comorbidity and IFG. Pt admits she is losing weight but feeling dizzy and lightheaded no fevers chills nausea or vomiting.    Blood Sugar Problem        The following portions of the patient's history were reviewed and updated as appropriate: allergies, current medications, past family history, past medical history, past social history, past surgical history and problem list.    Review of Systems   Constitutional: Negative.    HENT: Negative.     Eyes: Negative.    Respiratory: Negative.     Cardiovascular: Negative.    Gastrointestinal: Negative.    Endocrine: Negative.    Genitourinary: Negative.    Musculoskeletal: Negative.    Allergic/Immunologic: Negative.    Neurological:  Positive for dizziness.   Hematological: Negative.    Psychiatric/Behavioral: Negative.     All other systems reviewed and are negative.        Objective:      /86 (BP Location: Right arm, Patient Position: Sitting, Cuff Size:  "Standard)   Pulse 67   Temp (!) 97.3 °F (36.3 °C) (Temporal)   Ht 5' 3\" (1.6 m)   Wt 89.8 kg (198 lb)   LMP 01/12/2018 (Exact Date) Comment: post menopausal x 5 yrs.  SpO2 98%   BMI 35.07 kg/m²          Physical Exam  Constitutional:       Appearance: She is well-developed.   HENT:      Head: Atraumatic.      Right Ear: External ear normal.      Left Ear: External ear normal.   Eyes:      Conjunctiva/sclera: Conjunctivae normal.      Pupils: Pupils are equal, round, and reactive to light.   Cardiovascular:      Rate and Rhythm: Normal rate and regular rhythm.      Heart sounds: Normal heart sounds.   Pulmonary:      Effort: Pulmonary effort is normal. No respiratory distress.      Breath sounds: Normal breath sounds.   Abdominal:      General: There is no distension.      Palpations: Abdomen is soft.      Tenderness: There is no abdominal tenderness. There is no guarding or rebound.   Musculoskeletal:         General: Normal range of motion.      Cervical back: Normal range of motion.   Skin:     General: Skin is warm and dry.   Neurological:      Mental Status: She is alert and oriented to person, place, and time.      Cranial Nerves: No cranial nerve deficit.   Psychiatric:         Behavior: Behavior normal.         Thought Content: Thought content normal.         Judgment: Judgment normal.         "

## 2024-03-28 ENCOUNTER — TELEPHONE (OUTPATIENT)
Age: 54
End: 2024-03-28

## 2024-03-28 DIAGNOSIS — N95.1 MENOPAUSAL SYMPTOMS: Primary | ICD-10-CM

## 2024-03-28 RX ORDER — PROGESTERONE 200 MG/1
200 CAPSULE ORAL
Qty: 30 CAPSULE | Refills: 11 | Status: SHIPPED | OUTPATIENT
Start: 2024-03-28 | End: 2024-03-29 | Stop reason: RX

## 2024-03-28 NOTE — TELEPHONE ENCOUNTER
Pt has called all pharmacies near [facility] and [facility]; they are all sold out of progesterone 100 mg. Pt states she only has 3 left and does not know what to do. Please advise.

## 2024-03-28 NOTE — TELEPHONE ENCOUNTER
Pt having issues with refilling Progesterone through [facility], as they state they do not have this med supplied currently. Pt is attempting to find new pharmacy that has med in stock to refill and will call back to update new pharmacy location and request med be sent there.

## 2024-03-29 ENCOUNTER — TELEPHONE (OUTPATIENT)
Dept: FAMILY MEDICINE CLINIC | Facility: CLINIC | Age: 54
End: 2024-03-29

## 2024-03-29 DIAGNOSIS — R23.2 HOT FLASHES: Primary | ICD-10-CM

## 2024-03-29 NOTE — TELEPHONE ENCOUNTER
I don't tend to write those hormones chronically. Recommend discussion with a different GYN (perhaps with a prior GYN that she has seen?)

## 2024-03-29 NOTE — TELEPHONE ENCOUNTER
Regarding: medication  Contact: [phone]  ----- Message from Karolina Ojeda sent at 3/29/2024  9:43 AM EDT -----       ----- Message from Jessica Banda to Matt Craig sent at 3/29/2024  9:39 AM -----   I think they misunderstood my call. I wasn’t asking for him to write a script. I was asking to suggest another hormone dr since I was unable to get a hold of the dr I am currently under her care. I recently did get in contact with her and we have a separate plan to move forward with my menopause issues and I consider this conversation complete. Again I never asked for him to write an Rx for me. Sorry for any confusion.       ----- Message -----       From:Matt ALICIA       Sent:3/29/2024  9:32 AM EDT         To:Jessica Banda    Subject:medication     Good morning I left you a voice message but unfortunately dr king is not willing to write a script for you he is Recommending discussion with a different GYN (perhaps with a prior GYN that you have seen?) if you have any other questions please call our office                                                                                                             Thank you nella

## 2024-03-30 DIAGNOSIS — N95.1 MENOPAUSAL SYMPTOMS: Primary | ICD-10-CM

## 2024-04-01 ENCOUNTER — OFFICE VISIT (OUTPATIENT)
Dept: UROLOGY | Facility: CLINIC | Age: 54
End: 2024-04-01
Payer: OTHER GOVERNMENT

## 2024-04-01 VITALS
TEMPERATURE: 98.3 F | OXYGEN SATURATION: 98 % | BODY MASS INDEX: 35.46 KG/M2 | HEART RATE: 76 BPM | WEIGHT: 200.2 LBS | SYSTOLIC BLOOD PRESSURE: 112 MMHG | DIASTOLIC BLOOD PRESSURE: 76 MMHG

## 2024-04-01 DIAGNOSIS — R39.89 BLADDER PAIN: Primary | ICD-10-CM

## 2024-04-01 LAB
SL AMB  POCT GLUCOSE, UA: NORMAL
SL AMB LEUKOCYTE ESTERASE,UA: NORMAL
SL AMB POCT BILIRUBIN,UA: NORMAL
SL AMB POCT BLOOD,UA: NORMAL
SL AMB POCT CLARITY,UA: CLEAR
SL AMB POCT COLOR,UA: YELLOW
SL AMB POCT KETONES,UA: NORMAL
SL AMB POCT NITRITE,UA: NORMAL
SL AMB POCT PH,UA: 6
SL AMB POCT SPECIFIC GRAVITY,UA: 1.01
SL AMB POCT URINE PROTEIN: NORMAL
SL AMB POCT UROBILINOGEN: 0.2

## 2024-04-01 PROCEDURE — 99203 OFFICE O/P NEW LOW 30 MIN: CPT | Performed by: UROLOGY

## 2024-04-01 PROCEDURE — 81002 URINALYSIS NONAUTO W/O SCOPE: CPT | Performed by: UROLOGY

## 2024-04-01 NOTE — PATIENT INSTRUCTIONS
Let me know if your symptoms become worse and you wish to have a CT scan and I will place an order for.  Also let me know if you want to try an overactive bladder medications to control the incontinence if reducing your fluid consumption by 50% does not work.

## 2024-04-01 NOTE — PROGRESS NOTES
UROLOGY PROGRESS NOTE   [facility]  [address] [place] [address] [place]  [address]  [place] [address]  [phone]  Fax:[phone]  [email or web address]      NAME: Jessica Banda  AGE: 53 y.o. SEX: female  : 1970   MRN: [id number]    DATE: 2024  TIME: 8:26 AM    Assessment and Plan:    Episodic left lower quadrant abdominal pain traveling to the bladder region, along with urinary incontinence which sounds like urge incontinence.  She does soak her pads but she does not wish to take any overactive bladder medications at this time.  She does have a history of diverticulosis by CT scan.  It is possible she may have intermittent diverticulitis causing the symptoms or a mild form of that.  However her symptoms are not so severe that she is mandated to have a CT scan and her urinalysis shows no blood so we do not have to do a cystoscopy.  She will let me know if her symptoms get worse and we will get a CT scan with p.o. contrast only.  She is allergic to IV contrast.  This is to confirm my hunch that she may have bladder issues and lower abdominal pain due to intermittent diverticulitis.  She will try cutting her fluid consumption by 50% to see if that helps with her incontinence.  She will let me know if she wishes to try an overactive bladder medicine and I will try to call in upon her request.  We will order a CT scan upon her request if her symptoms get worse as above.  Otherwise follow-up as needed.                   Chief Complaint   No chief complaint on file.      History of Present Illness   New patient office visit: 53-year-old woman with urinary incontinence-she will get urge if sitting and lying then starting to move around, stands and starts to leak while feeling and trying to make it to BR leakage upon standing, soaks pad-, and pain in the bladder region occasionally.No previous  visits.GYN performed negative TV US.Started OCT 2022- this is when the pain started. She drinks 5-6 bottles  /day,16 oz each.  Started with LLQ pain that occurs when bladder full, sharp, comes and goes, or when she has to have a BM.She had CT 2021 showing diverticulosis, never had diverticulitis or stones- otherwise unremarkable.No pain with insertion of TV probe.  Bowels are generally regular- no pain with BM, no blood in stool or urine.S/P  .Tubal ligation age 33, found to endometriosis, is now post menopausal.    Urinalysis - 2024 and today..    The following portions of the patient's history were reviewed and updated as appropriate: allergies, current medications, past family history, past medical history, past social history, past surgical history and problem list.  Past Medical History:   Diagnosis Date    Allergic     Allergic rhinitis     Anxiety     Arthritis     Depression     Eczema     Endometriosis     Fibromyalgia     GERD (gastroesophageal reflux disease)     Gestational diabetes     Herpes     Hyperlipidemia     Hypertension     Menopause ovarian failure     Migraine     Miscarriage     Nasal congestion     NSVT (nonsustained ventricular tachycardia) (HCC) 2024    Obesity     Pulmonary embolism (HCC) 2015    Was on hormone replacement therapy    s/p Medtronic loop recorder 2024    Sleep apnea     on CPAP treatment    Sleep difficulties     Tachycardia     Vasospasm (HCC)      Past Surgical History:   Procedure Laterality Date    BREAST BIOPSY      CARDIAC CATHETERIZATION N/A 2023    Procedure: Cardiac catheterization;  Surgeon: Keyonna Prescott DO;  Location: [facility];  Service: Cardiology    CARDIAC CATHETERIZATION N/A 2023    Procedure: Cardiac Coronary Angiogram;  Surgeon: Keyonna Prescott DO;  [facility]: [facility];  Service: Cardiology    CARDIAC CATHETERIZATION Left 2023    Procedure: Cardiac Left Heart Cath;  Surgeon: Keyonna Prescott DO;  Location: [facility];  Service: Cardiology     CARDIAC ELECTROPHYSIOLOGY PROCEDURE N/A 2024    Procedure: Cardiac loop recorder implant;  Surgeon: Caren Dumont PA-C;  [facility]: [facility];  Service: Cardiology    CARPAL TUNNEL RELEASE       SECTION  1991    CHOLECYSTECTOMY  2013    HEMORRHOID SURGERY  1998    KNEE SURGERY      MENISCECTOMY      TUBAL LIGATION  2007    WISDOM TOOTH EXTRACTION         Review of Systems   Review of Systems   Gastrointestinal:  Positive for abdominal pain. Negative for blood in stool, constipation, diarrhea and rectal pain.   Genitourinary:  Positive for frequency. Negative for difficulty urinating, dysuria, enuresis and flank pain.        As per hpi       Active Problem List     Patient Active Problem List   Diagnosis    Acute pancreatitis    Anxiety    Neck pain    Eczema    Fibromyalgia    Hypercholesterolemia    Hyperlipidemia    Impaired fasting glucose    Sleep apnea    Tingling    Vitamin D deficiency    Xerosis of skin    GERD (gastroesophageal reflux disease)    Severe obesity (BMI 35.0-39.9) with comorbidity (HCC)    Chronic pain of both knees    Elevated blood sugar    Hepatic steatosis    Suprapubic pain    Left lower quadrant pain    Rhinitis medicamentosa    Non-seasonal allergic rhinitis due to pollen    Pulmonary nodules    Nasal congestion    Allergic rhinitis due to dust    Chronic seasonal allergic rhinitis due to pollen    Allergic rhinitis due to animal (cat) (dog) hair and dander    Venous insufficiency    Peripheral tear of medial meniscus of right knee as current injury    Benign paroxysmal positional vertigo due to bilateral vestibular disorder    Carpal tunnel syndrome    Low back pain    Non-cardiac chest pain    Panic disorder    Personal history of disorder of nervous system and sense organs    Pulmonary embolism with infarction (HCC)    Symptomatic menopausal or female climacteric states    Rotator cuff syndrome of right shoulder    Scapulothoracic syndrome    Mood  swings    Hot flashes    Vasospastic angina (HCC)    Prediabetes    Pelvic pain    Urge incontinence    Palpitations    NSVT (nonsustained ventricular tachycardia) (formerly [facility])    s/p Medtronic loop recorder 1/27/2024    Uterine fibroid       Objective   LMP 01/12/2018 (Exact Date) Comment: post menopausal x 5 yrs.    Physical Exam  Vitals reviewed.   Constitutional:       Appearance: Normal appearance.   HENT:      Head: Normocephalic and atraumatic.   Eyes:      Extraocular Movements: Extraocular movements intact.   Pulmonary:      Effort: Pulmonary effort is normal.   Musculoskeletal:         General: Normal range of motion.      Cervical back: Normal range of motion.   Skin:     Coloration: Skin is not jaundiced or pale.   Neurological:      General: No focal deficit present.      Mental Status: She is alert and oriented to person, place, and time.   Psychiatric:         Mood and Affect: Mood normal.         Behavior: Behavior normal.         Thought Content: Thought content normal.         Judgment: Judgment normal.             Current Medications     Current Outpatient Medications:     ammonium lactate (LAC-HYDRIN) 12 % cream, APPLY TOPICALLY AS NEEDED FOR DRY SKIN, Disp: 385 g, Rfl: 0    azelastine (ASTELIN) 0.1 % nasal spray, 1 spray into each nostril 2 (two) times a day Use in each nostril as directed, Disp: 90 mL, Rfl: 5    B Complex Vitamins (VITAMIN B COMPLEX) TABS, Take by mouth, Disp: , Rfl:     Cholecalciferol (VITAMIN D) 2000 units CAPS, Take by mouth, Disp: , Rfl:     Coenzyme Q10 10 MG capsule, Take 10 mg by mouth daily, Disp: , Rfl:     diltiazem (CARDIZEM CD) 180 mg 24 hr capsule, , Disp: , Rfl:     diltiazem (CARDIZEM) 30 mg tablet, Take 1 tablet (30 mg total) by mouth 2 (two) times a day, Disp: 180 tablet, Rfl: 3    fluticasone (FLONASE) 50 mcg/act nasal spray, 1 spray into each nostril 2 (two) times a day, Disp: 1 Bottle, Rfl: 0    metFORMIN (GLUCOPHAGE) 500 mg tablet, Take 1 tablet (500 mg total) by  mouth 2 (two) times a day with meals (Patient not taking: Reported on 3/20/2024), Disp: 180 tablet, Rfl: 1    Multiple Vitamin (MULTIVITAMIN ADULT PO), , Disp: , Rfl:     other medication, see sig,, Medication/product name: progesterone cream  Strength: 100mg/ml Sig (include dose, route, frequency): apply 0.5 ml to inner thigh daily, Disp: 15 mL, Rfl: 11    pantoprazole (PROTONIX) 40 mg tablet, Take 40 mg by mouth daily, Disp: , Rfl:     rosuvastatin (CRESTOR) 5 mg tablet, TAKE 1 TABLET DAILY, Disp: 90 tablet, Rfl: 1    Turmeric (QC TUMERIC COMPLEX PO), , Disp: , Rfl:         Wesley Estevez MD

## 2024-04-11 ENCOUNTER — TELEMEDICINE (OUTPATIENT)
Dept: OBGYN CLINIC | Facility: CLINIC | Age: 54
End: 2024-04-11
Payer: OTHER GOVERNMENT

## 2024-04-11 DIAGNOSIS — N95.1 MENOPAUSAL SYMPTOMS: Primary | ICD-10-CM

## 2024-04-11 DIAGNOSIS — R23.2 HOT FLASHES: ICD-10-CM

## 2024-04-11 PROBLEM — R45.86 MOOD SWINGS: Status: RESOLVED | Noted: 2022-11-16 | Resolved: 2024-04-11

## 2024-04-11 PROBLEM — R09.81 NASAL CONGESTION: Status: RESOLVED | Noted: 2020-11-18 | Resolved: 2024-04-11

## 2024-04-11 PROCEDURE — 99215 OFFICE O/P EST HI 40 MIN: CPT | Performed by: OBSTETRICS & GYNECOLOGY

## 2024-04-12 PROBLEM — R23.2 HOT FLASHES: Status: RESOLVED | Noted: 2022-11-16 | Resolved: 2024-04-12

## 2024-04-12 PROBLEM — T48.5X5A RHINITIS MEDICAMENTOSA: Status: RESOLVED | Noted: 2020-08-31 | Resolved: 2024-04-12

## 2024-04-12 PROBLEM — J31.0 RHINITIS MEDICAMENTOSA: Status: RESOLVED | Noted: 2020-08-31 | Resolved: 2024-04-12

## 2024-04-12 NOTE — PROGRESS NOTES
Virtual Regular Visit    Verification of patient location:    Patient is located at Home in the following state in which I hold an active license [place]      Assessment/Plan:    Problem List Items Addressed This Visit          Cardiovascular and Mediastinum    RESOLVED: Hot flashes    Relevant Medications    other medication, see sig,     Other Visit Diagnoses       Menopausal symptoms    -  Primary    Relevant Medications    other medication, see sig,          1) Overall she is doing much better! Compliance with testosterone cream would help with hot flash management. I proposed we combine the testosterone and progesterone creams together, can be applied day or night as transdermal PG is not sedating. I also may decrease testosterone dose slightly, as a higher strength was not as helpful  2) She agrees. Combine to one cream Progesterone 100mg/ testosterone 4 mg/ml apply 0.5 ml to inner thigh daily. This will not compromise heart rhythm management.  3) Email me with progress report as needed. Follow up prn    This was a 40 minute visit with greater than 50% of time spent in face to face counseling and coordination of care.       Chief Complaint   Patient presents with    Virtual Regular Visit          Encounter provider Elaine Zabala MD    Provider located at OB/GYN [facility] [address] [address] [phone]      Recent Visits  Date Type Provider Dept   04/11/24 Telemedicine Elaine Zabala MD  Ob/Gyn [facility]   Showing recent visits within past 7 days and meeting all other requirements  Future Appointments  No visits were found meeting these conditions.  Showing future appointments within next 150 days and meeting all other requirements       The patient was identified by name and date of birth. Jessica Banda was informed that this is a telemedicine visit and that the visit is being conducted  through the Epic Embedded platform. She agrees to proceed..  My office door was closed. No one else was in the room.  She acknowledged consent and understanding of privacy and security of the video platform. The patient has agreed to participate and understands they can discontinue the visit at any time.    Patient is aware this is a billable service.     Tom Lopez returns for hormone consult follow up.   I have been following Jessica for a year now, with initial menopausal symptom complaints as well as chronic fatigue, mild weight gain. HX of PE, so systemic estradiol contraindicated. Adrenal testing revealed mild cortisol elevations as well. I offered oral progesterone 100mg, tesotsterone cream, Adrenotone cortisol adaptagen.   She had been doing well on this until palpitations and tachycardia took her to ED. MD recommend stopping adrenotone. Since then:  1) Shortage of 100 mg doses forced me to give 200mg, which she did not tolerate well due to dizziness. Compounded PG cream 50 mg/d sent in, and instructions to make this appt for follow up. SO far, working well, no side effect.   2) Continued hot flashes at night. Kept forgetting to put testosterone cream so has not used this in awhile. She is back in the habit of doing cream at night, but hot flashes still occur.  3) Mood improved!  4) Weight loss of 45 lbs with [employer]!  5) Heart loop recorder placed, heart medication adjusted and flutters gone.   6) Was concerned about mild blood sugar increases for awhile. Started testing very frequently, PCP told to back off.   NO other new health concern.       Past Medical History:   Diagnosis Date    Allergic     Allergic rhinitis     Anxiety     Arthritis     Depression     Eczema     Endometriosis     Fibromyalgia     GERD (gastroesophageal reflux disease)     Gestational diabetes 2000    Herpes 1996    Hyperlipidemia     Hypertension     Menopause ovarian failure     Migraine     Miscarriage      Nasal congestion     NSVT (nonsustained ventricular tachycardia) ([facility]) 2024    Obesity     Pulmonary embolism ([facility]) 2015    Was on hormone replacement therapy    s/p Medtronic loop recorder 2024    Sleep apnea     on CPAP treatment    Sleep difficulties     Tachycardia     Vasospasm ([facility])        Past Surgical History:   Procedure Laterality Date    BREAST BIOPSY      CARDIAC CATHETERIZATION N/A 2023    Procedure: Cardiac catheterization;  Surgeon: Keyonna Prescott DO;  [facility];  Service: Cardiology    CARDIAC CATHETERIZATION N/A 2023    Procedure: Cardiac Coronary Angiogram;  Surgeon: Keyonna Prescott DO;  [facility];  Service: Cardiology    CARDIAC CATHETERIZATION Left 2023    Procedure: Cardiac Left Heart Cath;  Surgeon: Keyonna Prescott DO;  [facility];  Service: Cardiology    CARDIAC ELECTROPHYSIOLOGY PROCEDURE N/A 2024    Procedure: Cardiac loop recorder implant;  Surgeon: Caren Dumont PA-C;  [facility];  Service: Cardiology    CARPAL TUNNEL RELEASE       SECTION  1991    CHOLECYSTECTOMY  2013    HEMORRHOID SURGERY  1998    KNEE SURGERY      MENISCECTOMY      TUBAL LIGATION  2007    WISDOM TOOTH EXTRACTION         Current Outpatient Medications   Medication Sig Dispense Refill    other medication, see sig, Medication/product name: testosterone 4 mg.progesterone 100 mg/ml cream   Strength: as above  Sig (include dose, route, frequency): apply 0.5 ml to inner thigh daily 15 mL 11    ammonium lactate (LAC-HYDRIN) 12 % cream APPLY TOPICALLY AS NEEDED FOR DRY SKIN 385 g 0    azelastine (ASTELIN) 0.1 % nasal spray 1 spray into each nostril 2 (two) times a day Use in each nostril as directed 90 mL 5    B Complex Vitamins (VITAMIN B COMPLEX) TABS Take by mouth      Cholecalciferol (VITAMIN D) 2000 units CAPS Take by mouth      Coenzyme Q10 10 MG capsule Take 10 mg  by mouth daily      diltiazem (CARDIZEM CD) 180 mg 24 hr capsule  (Patient not taking: Reported on 3/20/2024)      diltiazem (CARDIZEM) 30 mg tablet Take 1 tablet (30 mg total) by mouth 2 (two) times a day 180 tablet 3    fluticasone (FLONASE) 50 mcg/act nasal spray 1 spray into each nostril 2 (two) times a day 1 Bottle 0    metFORMIN (GLUCOPHAGE) 500 mg tablet Take 1 tablet (500 mg total) by mouth 2 (two) times a day with meals (Patient not taking: Reported on 3/20/2024) 180 tablet 1    Multiple Vitamin (MULTIVITAMIN ADULT PO)       pantoprazole (PROTONIX) 40 mg tablet Take 40 mg by mouth daily      rosuvastatin (CRESTOR) 5 mg tablet TAKE 1 TABLET DAILY 90 tablet 1    Turmeric (QC TUMERIC COMPLEX PO)        No current facility-administered medications for this visit.        Allergies   Allergen Reactions    Acetazolamide Hives    Other Hives    Amoxicillin-Pot Clavulanate Hives    Iodinated Contrast Media      Needs to be prepped for IVC    Iothalamate Hives     Contrast dye    Oxycodone GI Intolerance and Vomiting    Sulfa Antibiotics Hives       Review of Systems   Constitutional:  Negative for fatigue and unexpected weight change.   Cardiovascular:  Negative for chest pain and palpitations.   Endocrine: Positive for heat intolerance.   Genitourinary: Negative.    Neurological: Negative.    Psychiatric/Behavioral:  Positive for sleep disturbance.      Video Exam    There were no vitals filed for this visit.    Physical Exam

## 2024-05-16 ENCOUNTER — REMOTE DEVICE CLINIC VISIT (OUTPATIENT)
Dept: CARDIOLOGY CLINIC | Facility: CLINIC | Age: 54
End: 2024-05-16
Payer: OTHER GOVERNMENT

## 2024-05-16 DIAGNOSIS — Z95.818 PRESENCE OF OTHER CARDIAC IMPLANTS AND GRAFTS: Primary | ICD-10-CM

## 2024-05-16 PROCEDURE — 93298 REM INTERROG DEV EVAL SCRMS: CPT | Performed by: INTERNAL MEDICINE

## 2024-05-16 NOTE — PROGRESS NOTES
"CARELINK TRANSMISSION: LOOP RECORDER. PRESENTING RHYTHM NSR @ 66 BPM. BATTERY STATUS \"OK.\" NO PATIENT OR DEVICE ACTIVATED EPISODES. NORMAL DEVICE FUNCTION. DL   "

## 2024-05-28 ENCOUNTER — OFFICE VISIT (OUTPATIENT)
Dept: FAMILY MEDICINE CLINIC | Facility: CLINIC | Age: 54
End: 2024-05-28
Payer: OTHER GOVERNMENT

## 2024-05-28 VITALS
WEIGHT: 200 LBS | SYSTOLIC BLOOD PRESSURE: 122 MMHG | TEMPERATURE: 98 F | OXYGEN SATURATION: 97 % | RESPIRATION RATE: 14 BRPM | HEART RATE: 78 BPM | HEIGHT: 63 IN | DIASTOLIC BLOOD PRESSURE: 78 MMHG | BODY MASS INDEX: 35.44 KG/M2

## 2024-05-28 DIAGNOSIS — J01.90 ACUTE SINUSITIS, RECURRENCE NOT SPECIFIED, UNSPECIFIED LOCATION: Primary | ICD-10-CM

## 2024-05-28 PROCEDURE — 99213 OFFICE O/P EST LOW 20 MIN: CPT | Performed by: FAMILY MEDICINE

## 2024-05-28 RX ORDER — AZITHROMYCIN 250 MG/1
TABLET, FILM COATED ORAL
Qty: 6 TABLET | Refills: 0 | Status: SHIPPED | OUTPATIENT
Start: 2024-05-28 | End: 2024-06-02

## 2024-05-30 NOTE — PROGRESS NOTES
"Assessment/Plan:    52 y/o woman with: acute sinusitis. Discussed supportive care and return parameters. Will give Z-pack.    No problem-specific Assessment & Plan notes found for this encounter.       Diagnoses and all orders for this visit:    Acute sinusitis, recurrence not specified, unspecified location  -     azithromycin (Zithromax) 250 mg tablet; Take 2 tablets (500 mg total) by mouth daily for 1 day, THEN 1 tablet (250 mg total) daily for 4 days.          Subjective:     Chief Complaint   Patient presents with    Sore Throat    Cough     Non productive        Patient ID: Jessica Banda is a 53 y.o. female.    Patient is a 52 y/o woman who presents for follow-up on one week of cough congestion sinus pressure no fevers chills nausea or vomiting.    Sore Throat   Associated symptoms include congestion and coughing.   Cough  Associated symptoms include a sore throat.       The following portions of the patient's history were reviewed and updated as appropriate: allergies, current medications, past family history, past medical history, past social history, past surgical history and problem list.    Review of Systems   Constitutional: Negative.    HENT:  Positive for congestion, sinus pressure and sore throat.    Eyes: Negative.    Respiratory:  Positive for cough.    Cardiovascular: Negative.    Gastrointestinal: Negative.    Endocrine: Negative.    Genitourinary: Negative.    Musculoskeletal: Negative.    Allergic/Immunologic: Negative.    Neurological: Negative.    Hematological: Negative.    Psychiatric/Behavioral: Negative.     All other systems reviewed and are negative.        Objective:      /78 (BP Location: Left arm, Patient Position: Sitting, Cuff Size: Standard)   Pulse 78   Temp 98 °F (36.7 °C) (Tympanic)   Resp 14   Ht 5' 3\" (1.6 m)   Wt 90.7 kg (200 lb)   LMP 01/12/2018 (Exact Date) Comment: post menopausal x 5 yrs.  SpO2 97%   BMI 35.43 kg/m²          Physical Exam  Constitutional:  "      Appearance: She is well-developed.   HENT:      Head: Atraumatic.      Right Ear: External ear normal.      Left Ear: External ear normal.   Eyes:      Extraocular Movements: EOM normal.      Conjunctiva/sclera: Conjunctivae normal.      Pupils: Pupils are equal, round, and reactive to light.   Cardiovascular:      Rate and Rhythm: Normal rate and regular rhythm.      Heart sounds: Normal heart sounds.   Pulmonary:      Effort: Pulmonary effort is normal. No respiratory distress.      Breath sounds: Normal breath sounds.   Abdominal:      General: Bowel sounds are normal. There is no distension.      Palpations: Abdomen is soft.      Tenderness: There is no abdominal tenderness. There is no guarding or rebound.   Musculoskeletal:         General: Normal range of motion.      Cervical back: Normal range of motion.   Skin:     General: Skin is warm and dry.   Neurological:      Mental Status: She is alert and oriented to person, place, and time.      Cranial Nerves: No cranial nerve deficit.   Psychiatric:         Mood and Affect: Mood and affect normal.         Behavior: Behavior normal.         Thought Content: Thought content normal.         Judgment: Judgment normal.

## 2024-08-02 DIAGNOSIS — I47.29 NSVT (NONSUSTAINED VENTRICULAR TACHYCARDIA) (HCC): ICD-10-CM

## 2024-08-15 ENCOUNTER — TELEMEDICINE (OUTPATIENT)
Dept: FAMILY MEDICINE CLINIC | Facility: CLINIC | Age: 54
End: 2024-08-15
Payer: OTHER GOVERNMENT

## 2024-08-15 VITALS — WEIGHT: 205 LBS | BODY MASS INDEX: 36.32 KG/M2 | HEIGHT: 63 IN | TEMPERATURE: 100.7 F

## 2024-08-15 DIAGNOSIS — U07.1 COVID: Primary | ICD-10-CM

## 2024-08-15 PROCEDURE — 99214 OFFICE O/P EST MOD 30 MIN: CPT | Performed by: FAMILY MEDICINE

## 2024-08-15 RX ORDER — NIRMATRELVIR AND RITONAVIR 300-100 MG
3 KIT ORAL 2 TIMES DAILY
Qty: 30 TABLET | Refills: 0 | Status: SHIPPED | OUTPATIENT
Start: 2024-08-15 | End: 2024-08-20

## 2024-08-15 NOTE — PROGRESS NOTES
Virtual Regular Visit  Name: Jessica Banda      : 1970      MRN: [id number]  Encounter Provider: Kodak Choi DO  Encounter Date: 8/15/2024   Encounter department: [facility]    Verification of patient location:    Patient is located at Home in the following state in which I hold an active license [place]    Assessment & Plan   1. COVID  -     nirmatrelvir & ritonavir (Paxlovid, 300/100,) tablet therapy pack; Take 3 tablets by mouth 2 (two) times a day for 5 days Take 2 nirmatrelvir tablets + 1 ritonavir tablet together per dose     COVID-19 Home Care Guidelines    Your healthcare provider and/or public health staff have evaluated you and have determined that you do not need to remain in the hospital at this time.  At this time you can be isolated at home where you will be monitored by staff from your local or state health department. You should carefully follow the prevention and isolation steps below until a healthcare provider or local or state health department says that you can return to your normal activities.      Stay home except to get medical care    People who are mildly ill with COVID-19 are able to isolate at home during their illness. You should restrict activities outside your home, except for getting medical care. Do not go to work, school, or public areas. Avoid using public transportation, ride-sharing, or taxis.    Separate yourself from other people and animals in your home    People: As much as possible, you should stay in a specific room and away from other people in your home. Also, you should use a separate bathroom, if available.  Animals: You should restrict contact with pets and other animals while you are sick with COVID-19, just like you would around other people. Although there have not been reports of pets or other animals becoming sick with COVID-19, it is still recommended that people sick with COVID-19 limit contact with animals until more  information is known about the virus. When possible, have another member of your household care for your animals while you are sick. If you are sick with COVID-19, avoid contact with your pet, including petting, snuggling, being kissed or licked, and sharing food. If you must care for your pet or be around animals while you are sick, wash your hands before and after you interact with pets and wear a facemask. See COVID-19 and Animals for more information.    Call ahead before visiting your doctor    If you have a medical appointment, call the healthcare provider and tell them that you have or may have COVID-19. This will help the healthcare provider’s office take steps to keep other people from getting infected or exposed.    Wear a facemask    You should wear a facemask when you are around other people (e.g., sharing a room or vehicle) or pets and before you enter a healthcare provider’s office. If you are not able to wear a facemask (for example, because it causes trouble breathing), then people who live with you should not stay in the same room with you, or they should wear a facemask if they enter your room.    Cover your coughs and sneezes    Cover your mouth and nose with a tissue when you cough or sneeze. Throw used tissues in a lined trash can. Immediately wash your hands with soap and water for at least 20 seconds or, if soap and water are not available, clean your hands with an alcohol-based hand  that contains at least 60% alcohol.    Clean your hands often    Wash your hands often with soap and water for at least 20 seconds, especially after blowing your nose, coughing, or sneezing; going to the bathroom; and before eating or preparing food. If soap and water are not readily available, use an alcohol-based hand  with at least 60% alcohol, covering all surfaces of your hands and rubbing them together until they feel dry.  Soap and water are the best option if hands are visibly dirty.  Avoid touching your eyes, nose, and mouth with unwashed hands.    Avoid sharing personal household items    You should not share dishes, drinking glasses, cups, eating utensils, towels, or bedding with other people or pets in your home. After using these items, they should be washed thoroughly with soap and water.    Clean all “high-touch” surfaces everyday    High touch surfaces include counters, tabletops, doorknobs, bathroom fixtures, toilets, phones, keyboards, tablets, and bedside tables. Also, clean any surfaces that may have blood, stool, or body fluids on them. Use a household cleaning spray or wipe, according to the label instructions. Labels contain instructions for safe and effective use of the cleaning product including precautions you should take when applying the product, such as wearing gloves and making sure you have good ventilation during use of the product.    Monitor your symptoms    Seek prompt medical attention if your illness is worsening (e.g., difficulty breathing). Before seeking care, call your healthcare provider and tell them that you have, or are being evaluated for, COVID-19. Put on a facemask before you enter the facility. These steps will help the healthcare provider’s office to keep other people in the office or waiting room from getting infected or exposed. Ask your healthcare provider to call the local or state health department. Persons who are placed under active monitoring or facilitated self-monitoring should follow instructions provided by their local health department or occupational health professionals, as appropriate.  If you have a medical emergency and need to call 911, notify the dispatch personnel that you have, or are being evaluated for COVID-19. If possible, put on a facemask before emergency medical services arrive.    Discontinuing home isolation    Patients with confirmed COVID-19 should remain under home isolation precautions until the following conditions are  met:   They have had no fever for at least 24 hours (that is one full day of no fever without the use medicine that reduces fevers)  AND  other symptoms have improved (for example, when their cough or shortness of breath have improved)  AND  If had mild or moderate illness, at least 10 days have passed since their symptoms first appeared or if severe illness (needed oxygen) or immunosuppressed, at least 20 days have passed since symptoms first appeared  Patients with confirmed COVID-19 should also notify close contacts (including their workplace) and ask that they self-quarantine. Currently, close contact is defined as being within 6 feet for 15 minutes or more from the period 24 hours starting 48 hours before symptom onset to the time at which the patient went into isolation.  Close contacts of patients diagnosed with COVID-19 should be instructed by the patient to self-quarantine for 14 days from the last time of their last contact with the patient.     Source: https://www.cdc.gov/coronavirus/2019-ncov/hcp/guidance-prevent-spread.html     Encounter provider Kodak Choi DO    The patient was identified by name and date of birth. Jessica Banda was informed that this is a telemedicine visit and that the visit is being conducted through the Epic Embedded platform. She agrees to proceed..  My office door was closed. No one else was in the room.  She acknowledged consent and understanding of privacy and security of the video platform. The patient has agreed to participate and understands they can discontinue the visit at any time.    Patient is aware this is a billable service.     History of Present Illness     Patient presents with:  Sore Throat: Pos for COVID,  sx started 8/12/24    Cough: Fever of 100.6 this morning. Congested, body aches.  Fatigue.    Virtual Regular Visit        Sore Throat   Associated symptoms include coughing and headaches. Pertinent negatives include no ear pain or shortness of breath.  "  Cough  This is a new problem. The current episode started in the past 7 days. The problem has been gradually worsening. The problem occurs constantly. The cough is Productive of sputum. Associated symptoms include chills, ear congestion, headaches, myalgias, nasal congestion, postnasal drip, rhinorrhea, a sore throat and sweats. Pertinent negatives include no chest pain, ear pain, fever, heartburn, hemoptysis, rash, shortness of breath, weight loss or wheezing. The symptoms are aggravated by lying down.       Review of Systems   Constitutional:  Positive for chills. Negative for fever and weight loss.   HENT:  Positive for postnasal drip, rhinorrhea and sore throat. Negative for ear pain.    Respiratory:  Positive for cough. Negative for hemoptysis, shortness of breath and wheezing.    Cardiovascular:  Negative for chest pain.   Gastrointestinal:  Negative for heartburn.   Musculoskeletal:  Positive for myalgias.   Skin:  Negative for rash.   Neurological:  Positive for headaches.     Pertinent Medical History   Past Medical History:   Diagnosis Date   • Allergic    • Allergic rhinitis    • Anxiety    • Arthritis    • Depression    • Eczema    • Endometriosis    • Fibromyalgia    • GERD (gastroesophageal reflux disease)    • Gestational diabetes 2000   • Herpes 1996   • Hyperlipidemia    • Hypertension    • Menopause ovarian failure    • Migraine    • Miscarriage    • Nasal congestion    • NSVT (nonsustained ventricular tachycardia) ([facility]) 01/27/2024   • Obesity    • Pulmonary embolism (HCC) 2015    Was on hormone replacement therapy   • s/p [employer] loop recorder 1/27/2024 01/27/2024   • Sleep apnea     on CPAP treatment   • Sleep difficulties    • Tachycardia    • Vasospasm (HCC)            Objective     Temp (!) 100.7 °F (38.2 °C)   Ht 5' 3\" (1.6 m)   Wt 93 kg (205 lb)   LMP 01/12/2018 (Exact Date) Comment: post menopausal x 5 yrs.  BMI 36.31 kg/m²   Physical Exam  Constitutional:       Appearance: She is " well-developed.   HENT:      Head: Normocephalic and atraumatic.   Eyes:      Pupils: Pupils are equal, round, and reactive to light.   Cardiovascular:      Rate and Rhythm: Normal rate.   Pulmonary:      Effort: Pulmonary effort is normal.   Abdominal:      Palpations: Abdomen is soft.   Musculoskeletal:         General: Normal range of motion.      Cervical back: Normal range of motion and neck supple.   Skin:     General: Skin is warm.   Neurological:      Mental Status: She is alert and oriented to person, place, and time.   Psychiatric:         Behavior: Behavior normal.         Visit Time  Total Visit Duration: 7

## 2024-08-16 ENCOUNTER — REMOTE DEVICE CLINIC VISIT (OUTPATIENT)
Dept: CARDIOLOGY CLINIC | Facility: CLINIC | Age: 54
End: 2024-08-16
Payer: OTHER GOVERNMENT

## 2024-08-16 DIAGNOSIS — I47.29 NSVT (NONSUSTAINED VENTRICULAR TACHYCARDIA) (HCC): Primary | ICD-10-CM

## 2024-08-16 PROCEDURE — 93298 REM INTERROG DEV EVAL SCRMS: CPT | Performed by: INTERNAL MEDICINE

## 2024-08-16 NOTE — PROGRESS NOTES
"CARELINK TRANSMISSION: LOOP RECORDER. PRESENTING RHYTHM ST @ 100 BPM. BATTERY STATUS \"OK.\" NO PATIENT OR DEVICE ACTIVATED EPISODES. NORMAL DEVICE FUNCTION. DL   "

## 2024-08-23 ENCOUNTER — OFFICE VISIT (OUTPATIENT)
Dept: FAMILY MEDICINE CLINIC | Facility: CLINIC | Age: 54
End: 2024-08-23
Payer: OTHER GOVERNMENT

## 2024-08-23 VITALS
OXYGEN SATURATION: 96 % | DIASTOLIC BLOOD PRESSURE: 80 MMHG | SYSTOLIC BLOOD PRESSURE: 120 MMHG | BODY MASS INDEX: 36.31 KG/M2 | WEIGHT: 205 LBS | HEART RATE: 94 BPM | TEMPERATURE: 98.5 F

## 2024-08-23 DIAGNOSIS — U09.9 POST-COVID CHRONIC COUGH: Primary | ICD-10-CM

## 2024-08-23 DIAGNOSIS — R05.3 POST-COVID CHRONIC COUGH: Primary | ICD-10-CM

## 2024-08-23 PROCEDURE — 99213 OFFICE O/P EST LOW 20 MIN: CPT | Performed by: FAMILY MEDICINE

## 2024-08-23 RX ORDER — GUAIFENESIN/DEXTROMETHORPHAN 100-10MG/5
5 SYRUP ORAL 3 TIMES DAILY PRN
Qty: 473 ML | Refills: 0 | Status: SHIPPED | OUTPATIENT
Start: 2024-08-23 | End: 2024-08-29

## 2024-08-23 RX ORDER — PREDNISONE 20 MG/1
40 TABLET ORAL DAILY
Qty: 10 TABLET | Refills: 0 | Status: SHIPPED | OUTPATIENT
Start: 2024-08-23 | End: 2024-08-28

## 2024-08-26 NOTE — PROGRESS NOTES
Assessment/Plan:    54 y/o woman with: post-COVID cough, will give cough meds and steroid burst, continue inhaler. Discussed supportive care and return parameters.     No problem-specific Assessment & Plan notes found for this encounter.       Diagnoses and all orders for this visit:    Post-COVID chronic cough  -     predniSONE 20 mg tablet; Take 2 tablets (40 mg total) by mouth daily for 5 days  -     dextromethorphan-guaiFENesin (ROBITUSSIN DM)  mg/5 mL syrup; Take 5 mL by mouth 3 (three) times a day as needed for congestion or cough          Subjective:     Chief Complaint   Patient presents with    COVID-19     Finished Paxlovid, but pt still has a cough.          Patient ID: Jessica Banda is a 53 y.o. female.    Patient is a 54 y/o woman who presents for follow-up on post-COVID cough, she tested positive for COVID and symptoms began 10 days ago, improving overall but still having cough congestion and wheezing no fevers chills nausea or vomiting.    Cough  This is a new problem. The current episode started in the past 7 days. The problem has been gradually worsening. The problem occurs constantly. The cough is Productive of purulent sputum. Associated symptoms include myalgias, nasal congestion, postnasal drip, rhinorrhea and wheezing. Pertinent negatives include no chest pain, chills, ear congestion, ear pain, fever, headaches, heartburn, hemoptysis, rash, sore throat, shortness of breath, sweats or weight loss. The symptoms are aggravated by lying down.       The following portions of the patient's history were reviewed and updated as appropriate: allergies, current medications, past family history, past medical history, past social history, past surgical history and problem list.    Review of Systems   Constitutional: Negative.  Negative for chills, fever and weight loss.   HENT:  Positive for congestion, postnasal drip and rhinorrhea. Negative for ear pain and sore throat.    Eyes: Negative.     Respiratory:  Positive for cough and wheezing. Negative for hemoptysis and shortness of breath.    Cardiovascular: Negative.  Negative for chest pain.   Gastrointestinal: Negative.  Negative for heartburn.   Endocrine: Negative.    Genitourinary: Negative.    Musculoskeletal:  Positive for myalgias.   Skin:  Negative for rash.   Allergic/Immunologic: Negative.    Neurological: Negative.  Negative for headaches.   Hematological: Negative.    Psychiatric/Behavioral: Negative.     All other systems reviewed and are negative.        Objective:      /80 (BP Location: Right arm, Patient Position: Sitting, Cuff Size: Standard)   Pulse 94   Temp 98.5 °F (36.9 °C)   Wt 93 kg (205 lb)   LMP 01/12/2018 (Exact Date) Comment: post menopausal x 5 yrs.  SpO2 96%   BMI 36.31 kg/m²          Physical Exam  Constitutional:       Appearance: She is well-developed.   HENT:      Head: Atraumatic.      Right Ear: External ear normal.      Left Ear: External ear normal.   Eyes:      Extraocular Movements: EOM normal.      Conjunctiva/sclera: Conjunctivae normal.      Pupils: Pupils are equal, round, and reactive to light.   Cardiovascular:      Rate and Rhythm: Normal rate and regular rhythm.      Heart sounds: Normal heart sounds.   Pulmonary:      Effort: Pulmonary effort is normal. No respiratory distress.      Breath sounds: Wheezing present.   Abdominal:      General: There is no distension.      Palpations: Abdomen is soft.   Musculoskeletal:         General: Normal range of motion.      Cervical back: Normal range of motion.   Skin:     General: Skin is warm and dry.   Neurological:      Mental Status: She is alert and oriented to person, place, and time.      Cranial Nerves: No cranial nerve deficit.   Psychiatric:         Mood and Affect: Mood and affect normal.         Behavior: Behavior normal.         Thought Content: Thought content normal.         Judgment: Judgment normal.

## 2024-08-28 ENCOUNTER — TELEPHONE (OUTPATIENT)
Dept: FAMILY MEDICINE CLINIC | Facility: CLINIC | Age: 54
End: 2024-08-28

## 2024-08-28 DIAGNOSIS — R11.2 NAUSEA AND VOMITING, UNSPECIFIED VOMITING TYPE: ICD-10-CM

## 2024-08-28 DIAGNOSIS — R05.3 POST-COVID CHRONIC COUGH: Primary | ICD-10-CM

## 2024-08-28 DIAGNOSIS — U09.9 POST-COVID CHRONIC COUGH: Primary | ICD-10-CM

## 2024-08-28 RX ORDER — PREDNISONE 10 MG/1
TABLET ORAL
Qty: 40 TABLET | Refills: 0 | Status: SHIPPED | OUTPATIENT
Start: 2024-08-28 | End: 2024-08-29 | Stop reason: SDUPTHER

## 2024-08-28 NOTE — TELEPHONE ENCOUNTER
Spoke with patient and advised of steroid called in. Patient is aware if she is not improving  she should contact our office to have a referral placed for ent or pulmonology. Patient verbalized understanding and has no further concerns at the time of call.

## 2024-08-29 RX ORDER — PREDNISONE 10 MG/1
TABLET ORAL
Qty: 40 TABLET | Refills: 0 | Status: SHIPPED | OUTPATIENT
Start: 2024-08-29

## 2024-08-29 RX ORDER — ONDANSETRON 4 MG/1
4 TABLET, ORALLY DISINTEGRATING ORAL EVERY 8 HOURS PRN
Qty: 30 TABLET | Refills: 1 | Status: SHIPPED | OUTPATIENT
Start: 2024-08-29

## 2024-08-29 NOTE — TELEPHONE ENCOUNTER
Patient called the office asking if the provider could possible send her in a script for Zofran. Pt is also asking that her prednisone is resent to [facility] in [employer]. Please review and advise

## 2024-08-29 NOTE — TELEPHONE ENCOUNTER
Please inform the patient the same in regards to anticoag, asa plavix.     Order MRI non contrast brain weakness left hand and arm   ordered

## 2024-09-03 ENCOUNTER — OFFICE VISIT (OUTPATIENT)
Dept: FAMILY MEDICINE CLINIC | Facility: CLINIC | Age: 54
End: 2024-09-03
Payer: OTHER GOVERNMENT

## 2024-09-03 VITALS
SYSTOLIC BLOOD PRESSURE: 130 MMHG | BODY MASS INDEX: 36.21 KG/M2 | OXYGEN SATURATION: 99 % | WEIGHT: 204.4 LBS | HEART RATE: 97 BPM | HEIGHT: 63 IN | DIASTOLIC BLOOD PRESSURE: 80 MMHG | TEMPERATURE: 98.2 F

## 2024-09-03 DIAGNOSIS — S33.5XXA SPRAIN OF LOW BACK, INITIAL ENCOUNTER: Primary | ICD-10-CM

## 2024-09-03 PROCEDURE — 99213 OFFICE O/P EST LOW 20 MIN: CPT | Performed by: FAMILY MEDICINE

## 2024-09-03 RX ORDER — MULTIVIT,TX WITH IRON,MINERALS
TABLET, EXTENDED RELEASE ORAL DAILY
COMMUNITY

## 2024-09-03 RX ORDER — TIZANIDINE 2 MG/1
2 TABLET ORAL EVERY 8 HOURS PRN
Qty: 30 TABLET | Refills: 1 | Status: SHIPPED | OUTPATIENT
Start: 2024-09-03

## 2024-09-05 NOTE — PROGRESS NOTES
Assessment/Plan:    53 y/o woman with: low back sprain. Will add muscle relaxer for breakthrough symptoms. Discussed supportive care and return parameters.     No problem-specific Assessment & Plan notes found for this encounter.       Diagnoses and all orders for this visit:    Sprain of low back, initial encounter  -     tiZANidine (ZANAFLEX) 2 mg tablet; Take 1 tablet (2 mg total) by mouth every 8 (eight) hours as needed for muscle spasms    Other orders  -     Magnesium Gluconate 250 MG TABS; Take by mouth daily          Subjective:     Chief Complaint   Patient presents with    Back Pain     Patient is complaining of back pain which is now going down to the outer right hip. Patient states she has been experiencing the pain for 1 week. Patient states she is feeling like the pain is not getting worse. No further concerns, ng        Patient ID: Jessica Banda is a 54 y.o. female.    Patient is a 53 y/o woman who presents for follow-up on flare of right sided back pain no radiation no fevers chills nausea or vomiting.    Back Pain  This is a new problem. The current episode started in the past 7 days. The problem occurs constantly. The problem has been gradually worsening since onset. The pain is present in the gluteal, lumbar spine, sacro-iliac and thoracic spine. The quality of the pain is described as burning, shooting and stabbing. The pain radiates to the right thigh. The pain is at a severity of 10/10. The pain is The same all the time. The symptoms are aggravated by coughing, position and twisting. Stiffness is present All day. Associated symptoms include leg pain and tingling. Pertinent negatives include no abdominal pain, bladder incontinence, bowel incontinence, chest pain, dysuria, fever, headaches, numbness, paresis, paresthesias, pelvic pain, perianal numbness, weakness or weight loss. Risk factors include menopause and obesity.       The following portions of the patient's history were reviewed and  "updated as appropriate: allergies, current medications, past family history, past medical history, past social history, past surgical history and problem list.    Review of Systems   Constitutional: Negative.  Negative for fever and weight loss.   HENT: Negative.     Eyes: Negative.    Respiratory: Negative.     Cardiovascular: Negative.  Negative for chest pain.   Gastrointestinal: Negative.  Negative for abdominal pain and bowel incontinence.   Endocrine: Negative.    Genitourinary: Negative.  Negative for bladder incontinence, dysuria and pelvic pain.   Musculoskeletal:  Positive for back pain.   Allergic/Immunologic: Negative.    Neurological:  Positive for tingling. Negative for weakness, numbness, headaches and paresthesias.   Hematological: Negative.    Psychiatric/Behavioral: Negative.     All other systems reviewed and are negative.        Objective:      /80 (BP Location: Left arm, Patient Position: Sitting, Cuff Size: Large)   Pulse 97   Temp 98.2 °F (36.8 °C) (Temporal)   Ht 5' 3\" (1.6 m)   Wt 92.7 kg (204 lb 6.4 oz)   LMP 01/12/2018 (Exact Date) Comment: post menopausal x 5 yrs.  SpO2 99%   BMI 36.21 kg/m²          Physical Exam  Constitutional:       Appearance: She is well-developed.   HENT:      Head: Atraumatic.      Right Ear: External ear normal.      Left Ear: External ear normal.   Eyes:      Extraocular Movements: EOM normal.      Conjunctiva/sclera: Conjunctivae normal.      Pupils: Pupils are equal, round, and reactive to light.   Cardiovascular:      Rate and Rhythm: Normal rate and regular rhythm.      Heart sounds: Normal heart sounds.   Pulmonary:      Effort: Pulmonary effort is normal. No respiratory distress.      Breath sounds: Normal breath sounds.   Abdominal:      General: There is no distension.      Palpations: Abdomen is soft.      Tenderness: There is no abdominal tenderness. There is no guarding or rebound.   Musculoskeletal:         General: Normal range of " motion.      Cervical back: Normal range of motion.   Skin:     General: Skin is warm and dry.   Neurological:      Mental Status: She is alert and oriented to person, place, and time.      Cranial Nerves: No cranial nerve deficit.   Psychiatric:         Mood and Affect: Mood and affect normal.         Behavior: Behavior normal.         Thought Content: Thought content normal.         Judgment: Judgment normal.

## 2024-09-10 NOTE — MISCELLANEOUS
Provider Comments  Provider Comments:   PT MISSED APPOINTMENT 05/04/2017 WITH DR Daniel Montes   Electronically signed by : Lamont Calderon, ; May  4 2017  1:01PM EST                       (Author) Pt. reports she wants to get better.

## 2024-09-12 DIAGNOSIS — E78.5 HYPERLIPIDEMIA, UNSPECIFIED HYPERLIPIDEMIA TYPE: ICD-10-CM

## 2024-09-12 DIAGNOSIS — L85.3 XEROSIS OF SKIN: ICD-10-CM

## 2024-09-12 RX ORDER — ROSUVASTATIN CALCIUM 5 MG/1
5 TABLET, COATED ORAL DAILY
Qty: 90 TABLET | Refills: 1 | Status: SHIPPED | OUTPATIENT
Start: 2024-09-12

## 2024-09-12 RX ORDER — AMMONIUM LACTATE 12 G/100G
CREAM TOPICAL AS NEEDED
Qty: 385 G | Refills: 0 | Status: SHIPPED | OUTPATIENT
Start: 2024-09-12

## 2024-09-17 ENCOUNTER — TELEPHONE (OUTPATIENT)
Age: 54
End: 2024-09-17

## 2024-09-17 ENCOUNTER — TELEPHONE (OUTPATIENT)
Dept: FAMILY MEDICINE CLINIC | Facility: CLINIC | Age: 54
End: 2024-09-17

## 2024-09-17 ENCOUNTER — PATIENT MESSAGE (OUTPATIENT)
Dept: FAMILY MEDICINE CLINIC | Facility: CLINIC | Age: 54
End: 2024-09-17

## 2024-09-17 DIAGNOSIS — M54.50 CHRONIC LOW BACK PAIN, UNSPECIFIED BACK PAIN LATERALITY, UNSPECIFIED WHETHER SCIATICA PRESENT: Primary | ICD-10-CM

## 2024-09-17 DIAGNOSIS — G89.29 CHRONIC LOW BACK PAIN, UNSPECIFIED BACK PAIN LATERALITY, UNSPECIFIED WHETHER SCIATICA PRESENT: Primary | ICD-10-CM

## 2024-09-17 NOTE — TELEPHONE ENCOUNTER
Caller: Jessica     Doctor: Daniela     Reason for call: Please mail new patient paperwork to address on file. Thank you     Call back#: [phone]

## 2024-09-19 ENCOUNTER — TELEPHONE (OUTPATIENT)
Dept: FAMILY MEDICINE CLINIC | Facility: CLINIC | Age: 54
End: 2024-09-19

## 2024-09-19 NOTE — TELEPHONE ENCOUNTER
Would pt like to try Gabapentin, Lyrica or Cymbalta for chronic pain? If she needs something else for pain let us know

## 2024-09-23 ENCOUNTER — LAB (OUTPATIENT)
Dept: LAB | Facility: MEDICAL CENTER | Age: 54
End: 2024-09-23
Payer: OTHER GOVERNMENT

## 2024-09-23 ENCOUNTER — NURSE TRIAGE (OUTPATIENT)
Age: 54
End: 2024-09-23

## 2024-09-23 DIAGNOSIS — R30.0 BURNING WITH URINATION: ICD-10-CM

## 2024-09-23 DIAGNOSIS — R30.0 BURNING WITH URINATION: Primary | ICD-10-CM

## 2024-09-23 LAB
BACTERIA UR QL AUTO: ABNORMAL /HPF
BILIRUB UR QL STRIP: NEGATIVE
CLARITY UR: CLEAR
COLOR UR: COLORLESS
GLUCOSE UR STRIP-MCNC: NEGATIVE MG/DL
HGB UR QL STRIP.AUTO: NEGATIVE
KETONES UR STRIP-MCNC: NEGATIVE MG/DL
LEUKOCYTE ESTERASE UR QL STRIP: ABNORMAL
NITRITE UR QL STRIP: NEGATIVE
NON-SQ EPI CELLS URNS QL MICRO: ABNORMAL /HPF
PH UR STRIP.AUTO: 6.5 [PH]
PROT UR STRIP-MCNC: NEGATIVE MG/DL
RBC #/AREA URNS AUTO: ABNORMAL /HPF
SP GR UR STRIP.AUTO: 1.01 (ref 1–1.03)
UROBILINOGEN UR STRIP-ACNC: <2 MG/DL
WBC #/AREA URNS AUTO: ABNORMAL /HPF

## 2024-09-23 PROCEDURE — 87086 URINE CULTURE/COLONY COUNT: CPT

## 2024-09-23 PROCEDURE — 81001 URINALYSIS AUTO W/SCOPE: CPT

## 2024-09-23 NOTE — TELEPHONE ENCOUNTER
"Spoke with patient who reports she started with burning with urination and urinary urgency last week. She denies any fever/flank pain/blood in urine.  She was advised UA/CS placed and to get that done as soon as possible.  She was advised she could use OTC Azo after to help with symptoms.  She verbalized understanding, no further questions or concerns at this time.    Reason for Disposition   All other urine symptoms    Answer Assessment - Initial Assessment Questions  1. SYMPTOM: \"What's the main symptom you're concerned about?\" (e.g., frequency, incontinence)      Burning with urination and urgency  2. ONSET: \"When did this  start?\"      Last week  3. PAIN: \"Is there any pain?\" If Yes, ask: \"How bad is it?\" (Scale: 1-10; mild, moderate, severe)      Burning mild moderate  4. CAUSE: \"What do you think is causing the symptoms?\"      uti  5. OTHER SYMPTOMS: \"Do you have any other symptoms?\" (e.g., fever, flank pain, blood in urine, pain with urination)      denies  6. PREGNANCY: \"Is there any chance you are pregnant?\" \"When was your last menstrual period?\"      postmenopausal    Protocols used: Urinary Symptoms-ADULT-[place]    "

## 2024-09-24 ENCOUNTER — TELEPHONE (OUTPATIENT)
Age: 54
End: 2024-09-24

## 2024-09-24 ENCOUNTER — TELEPHONE (OUTPATIENT)
Dept: GYNECOLOGY | Facility: CLINIC | Age: 54
End: 2024-09-24

## 2024-09-24 ENCOUNTER — TELEPHONE (OUTPATIENT)
Dept: FAMILY MEDICINE CLINIC | Facility: CLINIC | Age: 54
End: 2024-09-24

## 2024-09-24 DIAGNOSIS — R10.9 FLANK PAIN: Primary | ICD-10-CM

## 2024-09-24 DIAGNOSIS — N39.0 URINARY TRACT INFECTION WITHOUT HEMATURIA, SITE UNSPECIFIED: Primary | ICD-10-CM

## 2024-09-24 LAB — BACTERIA UR CULT: NORMAL

## 2024-09-24 RX ORDER — NITROFURANTOIN 25; 75 MG/1; MG/1
100 CAPSULE ORAL 2 TIMES DAILY
Qty: 14 CAPSULE | Refills: 0 | Status: SHIPPED | OUTPATIENT
Start: 2024-09-24 | End: 2024-09-25 | Stop reason: SDUPTHER

## 2024-09-24 NOTE — TELEPHONE ENCOUNTER
Urine culture is still pending.  Would prefer to wait until urine culture result is back, but the patient is quite interested in obtaining treatment due to her urinary symptoms and back pain.  Macrobid 100 mg twice daily for 7 days sent to local pharmacy.

## 2024-09-24 NOTE — TELEPHONE ENCOUNTER
Patient urine culture from [facility] negative. Patient has mid back pain especially at night and burning with urination. OB advised patient to contact PCP due to possible kidney issues. Patient going away Monday for work and would like to try to get her symptoms resolved before she goes.

## 2024-09-24 NOTE — TELEPHONE ENCOUNTER
Pt calling to clarify that she no longer needs to  antibiotics from pharmacy. RN confirmed that cultures did not grow bacteria, and therefore advised does not have UTI and antibiotics are not indicated or needed. Pt notes she continues to have burning with urination and back pain where her ribs are. RN recommended patient contacts her PCP to look into possibility of kidney stones that may cause symptoms, seeing as there is no bacterial infection in urine, but she continues to have symptoms. Advised push fluids, try AZO OTC and cranberry juice. Advised pt can call back if she would like a pelvic exam for further investigation from an OBGYN perspective. Pt verbalized an understanding. Will call PCP first to discuss symptoms.

## 2024-09-24 NOTE — TELEPHONE ENCOUNTER
Patient needs Macrobid script to go to the [facility] [employer] on [address]. She is going away and it would not be there in time with [facility].

## 2024-09-24 NOTE — RESULT ENCOUNTER NOTE
Urine culture came back negative.  Did send prescription recently due to patient strong desire for medical treatment.  Would suggest not take any antibiotics at this time given the negative culture.  If she continues with symptoms, would recommend exam.

## 2024-09-24 NOTE — TELEPHONE ENCOUNTER
Called patient and made aware of provider recommendations. Patient states has upcoming appt with PCP tomorrow. Advised to call back for further concerns or if she would like to schedule an appointment. Patient verbalized understanding.

## 2024-09-25 ENCOUNTER — OFFICE VISIT (OUTPATIENT)
Age: 54
End: 2024-09-25
Payer: OTHER GOVERNMENT

## 2024-09-25 VITALS
BODY MASS INDEX: 36.68 KG/M2 | WEIGHT: 207 LBS | SYSTOLIC BLOOD PRESSURE: 120 MMHG | OXYGEN SATURATION: 100 % | DIASTOLIC BLOOD PRESSURE: 74 MMHG | TEMPERATURE: 97.6 F | HEIGHT: 63 IN | HEART RATE: 98 BPM

## 2024-09-25 DIAGNOSIS — N39.0 URINARY TRACT INFECTION WITHOUT HEMATURIA, SITE UNSPECIFIED: ICD-10-CM

## 2024-09-25 DIAGNOSIS — M54.50 ACUTE BILATERAL LOW BACK PAIN WITHOUT SCIATICA: Primary | ICD-10-CM

## 2024-09-25 DIAGNOSIS — N30.00 ACUTE CYSTITIS WITHOUT HEMATURIA: ICD-10-CM

## 2024-09-25 PROCEDURE — 99214 OFFICE O/P EST MOD 30 MIN: CPT | Performed by: FAMILY MEDICINE

## 2024-09-25 RX ORDER — NITROFURANTOIN 25; 75 MG/1; MG/1
100 CAPSULE ORAL 2 TIMES DAILY
Qty: 14 CAPSULE | Refills: 0 | Status: SHIPPED | OUTPATIENT
Start: 2024-09-25

## 2024-09-25 NOTE — PROGRESS NOTES
Ambulatory Visit  Name: Jessica Banda      : 1970      MRN: [id number]  Encounter Provider: Kodak Choi DO  Encounter Date: 2024   Encounter department: [facility] PRIMARY [facility]    Assessment & Plan  Acute bilateral low back pain without sciatica    Orders:    diclofenac sodium (VOLTAREN) 50 mg EC tablet; Take 1 tablet (50 mg total) by mouth 3 (three) times a day    Acute cystitis without hematuria    Orders:    nitrofurantoin (MACROBID) 100 mg capsule; Take 1 capsule (100 mg total) by mouth 2 (two) times a day    Urinary tract infection without hematuria, site unspecified    Orders:    nitrofurantoin (MACROBID) 100 mg capsule; Take 1 capsule (100 mg total) by mouth 2 (two) times a day       Hopefully she will find some relief with this treatment.  She will be following up with a back pain specialist in the near future.  Follow-up here as needed.    History of Present Illness     Patient presents with:  Back Pain: Pain is complaining of back pain since August and medication is not relieving the pain. No other concerns.   LR    She also complains of urinary tract symptoms with burning and itching.  This has been going on for about a week and a half.    Back Pain  Associated symptoms include dysuria. Pertinent negatives include no fever.       History obtained from : patient  Review of Systems   Constitutional:  Positive for activity change. Negative for fever.   HENT: Negative.     Respiratory: Negative.     Cardiovascular: Negative.    Gastrointestinal: Negative.    Genitourinary:  Positive for dysuria.   Musculoskeletal:  Positive for back pain.     Pertinent Medical History   Past Medical History:   Diagnosis Date    Allergic     Allergic rhinitis     Anxiety     Arthritis     Depression     Eczema     Endometriosis     Fibromyalgia     GERD (gastroesophageal reflux disease)     Gestational diabetes 2000    Herpes     Hyperlipidemia     Hypertension     Menopause ovarian failure      "Migraine     Miscarriage     Nasal congestion     NSVT (nonsustained ventricular tachycardia) (HCC) 01/27/2024    Obesity     Pulmonary embolism (HCC) 2015    Was on hormone replacement therapy    s/p Medtronic loop recorder 1/27/2024 01/27/2024    Sleep apnea     on CPAP treatment    Sleep difficulties     Tachycardia     Vasospasm (HCC)        Past Medical History:   Diagnosis Date    Allergic     Allergic rhinitis     Anxiety     Arthritis     Depression     Eczema     Endometriosis     Fibromyalgia     GERD (gastroesophageal reflux disease)     Gestational diabetes 2000    Herpes 1996    Hyperlipidemia     Hypertension     Menopause ovarian failure     Migraine     Miscarriage     Nasal congestion     NSVT (nonsustained ventricular tachycardia) (HCC) 01/27/2024    Obesity     Pulmonary embolism (HCC) 2015    Was on hormone replacement therapy    s/p Medtronic loop recorder 1/27/2024 01/27/2024    Sleep apnea     on CPAP treatment    Sleep difficulties     Tachycardia     Vasospasm (HCC)            Medical History Reviewed by provider this encounter:  Tobacco  Allergies  Meds  Problems  Med Hx  Surg Hx  Fam Hx           Objective     /74 (BP Location: Left arm, Patient Position: Sitting, Cuff Size: Adult)   Pulse 98   Temp 97.6 °F (36.4 °C) (Tympanic)   Ht 5' 3\" (1.6 m)   Wt 93.9 kg (207 lb)   LMP 01/12/2018 (Exact Date) Comment: post menopausal x 5 yrs.  SpO2 100%   BMI 36.67 kg/m²     Physical Exam  Vitals and nursing note reviewed.   Constitutional:       Appearance: She is well-developed.   HENT:      Head: Normocephalic.   Eyes:      Pupils: Pupils are equal, round, and reactive to light.   Cardiovascular:      Rate and Rhythm: Normal rate and regular rhythm.      Heart sounds: Normal heart sounds.   Pulmonary:      Effort: Pulmonary effort is normal.      Breath sounds: Normal breath sounds.   Abdominal:      General: Bowel sounds are normal.      Palpations: Abdomen is soft. "   Musculoskeletal:      Cervical back: Normal range of motion and neck supple. Tenderness present.      Lumbar back: Tenderness present. Decreased range of motion.   Lymphadenopathy:      Cervical: Cervical adenopathy present.   Skin:     General: Skin is warm and dry.      Capillary Refill: Capillary refill takes less than 2 seconds.   Neurological:      General: No focal deficit present.      Mental Status: She is alert and oriented to person, place, and time.   Psychiatric:         Mood and Affect: Mood normal.         Behavior: Behavior normal.

## 2024-09-25 NOTE — ASSESSMENT & PLAN NOTE
Orders:    diclofenac sodium (VOLTAREN) 50 mg EC tablet; Take 1 tablet (50 mg total) by mouth 3 (three) times a day

## 2024-09-27 DIAGNOSIS — N95.1 MENOPAUSAL SYMPTOMS: Primary | ICD-10-CM

## 2024-09-27 RX ORDER — PROGESTERONE 100 MG/1
100 CAPSULE ORAL
Qty: 30 CAPSULE | Refills: 11 | Status: SHIPPED | OUTPATIENT
Start: 2024-09-27

## 2024-09-30 DIAGNOSIS — N95.1 MENOPAUSAL SYMPTOMS: ICD-10-CM

## 2024-09-30 RX ORDER — PROGESTERONE 100 MG/1
100 CAPSULE ORAL
Qty: 90 CAPSULE | Refills: 3 | Status: SHIPPED | OUTPATIENT
Start: 2024-09-30

## 2024-10-05 ENCOUNTER — APPOINTMENT (OUTPATIENT)
Dept: LAB | Facility: MEDICAL CENTER | Age: 54
End: 2024-10-05
Payer: OTHER GOVERNMENT

## 2024-10-05 DIAGNOSIS — R10.9 FLANK PAIN: ICD-10-CM

## 2024-10-05 DIAGNOSIS — E27.9 ADRENAL GLAND DYSFUNCTION (HCC): ICD-10-CM

## 2024-10-05 LAB
ALBUMIN SERPL BCG-MCNC: 4.2 G/DL (ref 3.5–5)
ALP SERPL-CCNC: 79 U/L (ref 34–104)
ALT SERPL W P-5'-P-CCNC: 23 U/L (ref 7–52)
ANION GAP SERPL CALCULATED.3IONS-SCNC: 11 MMOL/L (ref 4–13)
AST SERPL W P-5'-P-CCNC: 19 U/L (ref 13–39)
BILIRUB SERPL-MCNC: 0.62 MG/DL (ref 0.2–1)
BUN SERPL-MCNC: 13 MG/DL (ref 5–25)
CALCIUM SERPL-MCNC: 9 MG/DL (ref 8.4–10.2)
CHLORIDE SERPL-SCNC: 105 MMOL/L (ref 96–108)
CO2 SERPL-SCNC: 26 MMOL/L (ref 21–32)
CORTIS AM PEAK SERPL-MCNC: 7.5 UG/DL (ref 6.7–22.6)
CREAT SERPL-MCNC: 0.6 MG/DL (ref 0.6–1.3)
GFR SERPL CREATININE-BSD FRML MDRD: 102 ML/MIN/1.73SQ M
GLUCOSE P FAST SERPL-MCNC: 111 MG/DL (ref 65–99)
POTASSIUM SERPL-SCNC: 3.8 MMOL/L (ref 3.5–5.3)
PROT SERPL-MCNC: 7 G/DL (ref 6.4–8.4)
SODIUM SERPL-SCNC: 142 MMOL/L (ref 135–147)
TESTOST SERPL-MSCNC: 47 NG/DL

## 2024-10-05 PROCEDURE — 84403 ASSAY OF TOTAL TESTOSTERONE: CPT

## 2024-10-05 PROCEDURE — 80053 COMPREHEN METABOLIC PANEL: CPT

## 2024-10-05 PROCEDURE — 82627 DEHYDROEPIANDROSTERONE: CPT

## 2024-10-05 PROCEDURE — 82533 TOTAL CORTISOL: CPT

## 2024-10-05 PROCEDURE — 36415 COLL VENOUS BLD VENIPUNCTURE: CPT

## 2024-10-08 ENCOUNTER — OFFICE VISIT (OUTPATIENT)
Dept: PAIN MEDICINE | Facility: CLINIC | Age: 54
End: 2024-10-08
Payer: OTHER GOVERNMENT

## 2024-10-08 VITALS
HEIGHT: 63 IN | DIASTOLIC BLOOD PRESSURE: 78 MMHG | WEIGHT: 207 LBS | SYSTOLIC BLOOD PRESSURE: 118 MMHG | BODY MASS INDEX: 36.68 KG/M2

## 2024-10-08 DIAGNOSIS — M47.814 THORACIC SPONDYLOSIS: ICD-10-CM

## 2024-10-08 DIAGNOSIS — M54.14 THORACIC RADICULITIS: Primary | ICD-10-CM

## 2024-10-08 DIAGNOSIS — M47.814 THORACIC SPONDYLARTHRITIS: ICD-10-CM

## 2024-10-08 LAB — DHEA-S SERPL-MCNC: 166 UG/DL (ref 29.4–220.5)

## 2024-10-08 PROCEDURE — 99204 OFFICE O/P NEW MOD 45 MIN: CPT | Performed by: ANESTHESIOLOGY

## 2024-10-08 NOTE — PROGRESS NOTES
Assessment  1. Thoracic radiculitis    2. Thoracic spondylarthritis    3. Thoracic spondylosis      Patient presenting with chronic mid back pain for greater than 4 years, worsening over the past several months. Pain is consistent with thoracic radiculitis, myofascial pain accompanied by pain >7/10 on the pain scale with inability to participate in IADLs for >6 weeks. Patient has participated with physical therapy as well as home exercises and stretches.  Patient has tried Tylenol, NSAIDs, tizanidine, oral steroids with modest benefit. Denies any bowel or bladder incontinence, saddle anesthesia.    In regards to the patient's  pathology, we discussed the various treatment options including physical therapy, chiropractic treatment, medication management, activity modifications, interventional spine procedures.  Given that patient has not had any benefit with conservative treatments, I think patient would benefit from targeted interventional treatment modalities.    Plan:    Given that the patient has failed physical therapy, home exercises and chiropractic therapy in the recent past, I recommend obtaining a thoracic MRI for further workup as she does endorsing pain that radiates in dermatomal patterns from the thoracic spine.    Will follow up after thoracic MRI to discuss interventional options such as SEBASTIÁN, TPIs, etc.    Reviewed external notes from family medicine, rheumatology, physiatry offices for review and interpretation of recent and prior relevant medical histories, treatment recommendations, medication and/or interventional treatment responses.    Reviewed hemoglobin A1c, renal function, CBC and/or PT/INR prior to discussing/offering interventional modalities.    [place] Prescription Drug Monitoring Program report was reviewed and was appropriate     My impressions and treatment recommendations were discussed in detail with the patient who verbalized understanding and had no further questions.   Discharge instructions were provided. I personally saw and examined the patient and I agree with the above discussed plan of care.    Orders Placed This Encounter   Procedures    MRI thoracic spine wo contrast     Standing Status:   Future     Standing Expiration Date:   10/8/2028     Scheduling Instructions:      There is no preparation for this test. Please leave your jewelry and valuables at home, wedding rings are the exception. All patients will be required to change into a hospital gown and pants.  Street clothes are not permitted in the MRI.  Magnetic nail polish must be removed prior to arrival for your test. Please bring your insurance cards, a form of photo ID and a list of your medications with you. Arrive 15 minutes prior to your appointment time in order to register. Please bring any prior CT or MRI studies of this area that were not performed at a [facility].            To schedule this appointment, please contact Central Scheduling at [phone].            Prior to your appointment, please make sure you complete the MRI Screening Form when you e-Check in for your appointment. This will be available starting 7 days before your appointment in [employer]. You may receive an e-mail with an activation code if you do not have a [employer] account. If you do not have access to a device, we will complete your screening at your appointment.     Order Specific Question:   Reason for Exam     Answer:   mid back pain with radiating features to the chest R>L     Order Specific Question:   Is the patient pregnant?     Answer:   No     Order Specific Question:   What is the patient's sedation requirement?     Answer:   Anesthesia     Order Specific Question:   Does the patient need medication for Claustrophobia? If yes, order medication at this point.     Answer:   No     Order Specific Question:   Does the patient wear a life vest, have an implanted cardiac device, a stimulation device, a sleep apnea  stimulator, or a breast tissue expansion device?     Answer:   No     Order Specific Question:   Release to patient through [employer]     Answer:   Immediate     No orders of the defined types were placed in this encounter.      History of Present Illness    Jessica Banda is a 54 y.o. female presenting for new patient visit (self referred) at [facility] for exam and evaluation of chronic mid back and radiating chest wall pain for greater than 4 years, worsening over the past several months. Pain started without any precipitating injury or trauma. Over the past month, the intensity of pain has been Moderate to severe. Pain is currently 10/10. Pain does interfere with age appropriate activities of daily living. Pain is constant, worse at night. Pain is described as burning, shooting, sharp, throbbing, dull/aching. Patient endorses weakness in the upper extremities. Assistance device used: None.    Worsening factors noted: lying down, bending, sitting, exercise, coughing.   Improving factors noted: none noted.    Treatments tried:   Heat/ice: yes  TENS: yes  PT: yes  Chiropractic therapy: yes  Injections: no   Previous spine surgery: No    Anticoagulation: no    Medications tried:   Lidocaine patch  NSAIDs  Tizanidine  Oral steroids    I have personally reviewed and/or updated the patient's past medical history, past surgical history, family history, social history, current medications, allergies, and vital signs today.     Review of Systems   Constitutional:  Negative for chills and fever.   HENT:  Negative for ear pain and sore throat.    Eyes:  Negative for pain and visual disturbance.   Respiratory:  Negative for cough and shortness of breath.    Cardiovascular:  Negative for chest pain and palpitations.   Gastrointestinal:  Negative for abdominal pain and vomiting.   Genitourinary:  Negative for dysuria and hematuria.   Musculoskeletal:  Positive for arthralgias, back pain, gait problem and  myalgias.   Skin:  Negative for color change and rash.   Neurological:  Positive for dizziness, weakness and numbness. Negative for seizures and syncope.   All other systems reviewed and are negative.      Patient Active Problem List   Diagnosis    Acute pancreatitis    Anxiety    Neck pain    Eczema    Fibromyalgia    Hyperlipidemia    Impaired fasting glucose    Sleep apnea    Vitamin D deficiency    Xerosis of skin    GERD (gastroesophageal reflux disease)    Severe obesity (BMI 35.0-39.9) with comorbidity (HCC)    Chronic pain of both knees    Elevated blood sugar    Hepatic steatosis    Suprapubic pain    Left lower quadrant pain    Non-seasonal allergic rhinitis due to pollen    Pulmonary nodules    Allergic rhinitis due to dust    Chronic seasonal allergic rhinitis due to pollen    Allergic rhinitis due to animal (cat) (dog) hair and dander    Venous insufficiency    Peripheral tear of medial meniscus of right knee as current injury    Benign paroxysmal positional vertigo due to bilateral vestibular disorder    Carpal tunnel syndrome    Low back pain    Non-cardiac chest pain    Panic disorder    Personal history of disorder of nervous system and sense organs    Pulmonary embolism with infarction ([facility])    Symptomatic menopausal or female climacteric states    Rotator cuff syndrome of right shoulder    Scapulothoracic syndrome    Vasospastic angina ([facility])    Prediabetes    Pelvic pain    Urge incontinence    Palpitations    NSVT (nonsustained ventricular tachycardia) ([facility])    s/p Medtronic loop recorder 1/27/2024    Uterine fibroid    Post-COVID chronic cough       Past Medical History:   Diagnosis Date    Allergic     Allergic rhinitis     Anxiety     Arthritis     Depression     Eczema     Endometriosis     Fibromyalgia     GERD (gastroesophageal reflux disease)     Gestational diabetes 2000    Herpes 1996    Hyperlipidemia     Hypertension     Menopause ovarian failure     Migraine     Miscarriage     Nasal  congestion     NSVT (nonsustained ventricular tachycardia) ([facility]) 2024    Obesity     Pulmonary embolism ([facility]) 2015    Was on hormone replacement therapy    s/p Medtronic loop recorder 2024    Sleep apnea     on CPAP treatment    Sleep difficulties     Tachycardia     Vasospasm ([facility])        Past Surgical History:   Procedure Laterality Date    BREAST BIOPSY      CARDIAC CATHETERIZATION N/A 2023    Procedure: Cardiac catheterization;  Surgeon: Keyonna Prescott DO;  Location: [facility];  Service: Cardiology    CARDIAC CATHETERIZATION N/A 2023    Procedure: Cardiac Coronary Angiogram;  Surgeon: Keyonna Prescott DO;  Location: [facility];  Service: Cardiology    CARDIAC CATHETERIZATION Left 2023    Procedure: Cardiac Left Heart Cath;  Surgeon: Keyonna Prescott DO;  Location: [facility];  Service: Cardiology    CARDIAC ELECTROPHYSIOLOGY PROCEDURE N/A 2024    Procedure: Cardiac loop recorder implant;  Surgeon: Caren Dumont PA-C;  Location: [facility];  Service: Cardiology    CARPAL TUNNEL RELEASE       SECTION  1991    CHOLECYSTECTOMY  2013    HEMORRHOID SURGERY  1998    KNEE SURGERY      MENISCECTOMY      TUBAL LIGATION  2007    WISDOM TOOTH EXTRACTION         Family History   Problem Relation Age of Onset    Alzheimer's disease Father     Diabetes Father     Leukemia Mother     Cancer Mother         Leukemia       Social History     Occupational History    Not on file   Tobacco Use    Smoking status: Never    Smokeless tobacco: Never   Vaping Use    Vaping status: Never Used   Substance and Sexual Activity    Alcohol use: Not Currently    Drug use: Never    Sexual activity: Not Currently     Partners: Male     Birth control/protection: Abstinence     Comment:  is not interested       Current Outpatient Medications on File Prior to Visit   Medication Sig    ammonium lactate (LAC-HYDRIN) 12 % cream  "APPLY TOPICALLY AS NEEDED FOR DRY SKIN    B Complex Vitamins (VITAMIN B COMPLEX) TABS Take by mouth    Cholecalciferol (VITAMIN D) 2000 units CAPS Take by mouth    diclofenac sodium (VOLTAREN) 50 mg EC tablet Take 1 tablet (50 mg total) by mouth 3 (three) times a day    diltiazem (CARDIZEM) 30 mg tablet TAKE 1 TABLET BY MOUTH TWICE A DAY    fluticasone (FLONASE) 50 mcg/act nasal spray 1 spray into each nostril 2 (two) times a day    Magnesium Gluconate 250 MG TABS Take by mouth daily    ondansetron (ZOFRAN-ODT) 4 mg disintegrating tablet Take 1 tablet (4 mg total) by mouth every 8 (eight) hours as needed for nausea or vomiting    other medication, see sig, Medication/product name: testosterone 4 mg./ml cream   Strength: as above  Sig (include dose, route, frequency): apply 0.5 ml to inner thigh daily    Progesterone 100 MG CAPS Take 100 mg by mouth at bedtime    rosuvastatin (CRESTOR) 5 mg tablet TAKE 1 TABLET DAILY    azelastine (ASTELIN) 0.1 % nasal spray 1 spray into each nostril 2 (two) times a day Use in each nostril as directed    Coenzyme Q10 10 MG capsule Take 10 mg by mouth daily (Patient not taking: Reported on 10/8/2024)    nitrofurantoin (MACROBID) 100 mg capsule Take 1 capsule (100 mg total) by mouth 2 (two) times a day (Patient not taking: Reported on 10/8/2024)    tiZANidine (ZANAFLEX) 2 mg tablet Take 1 tablet (2 mg total) by mouth every 8 (eight) hours as needed for muscle spasms (Patient not taking: Reported on 10/8/2024)     No current facility-administered medications on file prior to visit.       Allergies   Allergen Reactions    Acetazolamide Hives    Other Hives    Amoxicillin-Pot Clavulanate Hives    Iodinated Contrast Media      Needs to be prepped for IVC    Iothalamate Hives     Contrast dye    Oxycodone GI Intolerance and Vomiting    Sulfa Antibiotics Hives       Physical Exam    /78   Ht 5' 3\" (1.6 m)   Wt 93.9 kg (207 lb)   LMP 01/12/2018 (Exact Date) Comment: post menopausal x " 5 yrs.  BMI 36.67 kg/m²     Constitutional: normal, well developed, well nourished, alert, in no distress and non-toxic and no overt pain behavior.  Eyes: anicteric  HEENT: grossly intact  Neck: supple, symmetric, trachea midline and no masses   Pulmonary:even and unlabored  Cardiovascular:No edema or pitting edema present  Skin:Normal without rashes or lesions and well hydrated  Psychiatric:Mood and affect appropriate  Neurologic: Motor function is grossly intact with no focal neurologic deficits   Musculoskeletal: Tenderness in the R>L thoracic paraspinal muscles.    Imaging

## 2024-10-11 ENCOUNTER — TELEPHONE (OUTPATIENT)
Age: 54
End: 2024-10-11

## 2024-10-11 DIAGNOSIS — K59.00 CONSTIPATION, UNSPECIFIED CONSTIPATION TYPE: Primary | ICD-10-CM

## 2024-10-11 RX ORDER — LACTULOSE 10 G/15ML
20 SOLUTION ORAL 2 TIMES DAILY PRN
Qty: 240 ML | Refills: 0 | Status: SHIPPED | OUTPATIENT
Start: 2024-10-11

## 2024-10-11 NOTE — TELEPHONE ENCOUNTER
Patient contacted the office this morning stating that she has had constipation for 3 weeks. Saw pain med 10/08 who ordered an mri as they believe she has a herniated disc. They relayed that constipation could be related to that. She has tried miralax, fiber supplements, laxatives, many things otc, would have BM of little maurisio but nothing more. Did try a colonoscopy drink on Saturday which had her going throughout the day then. Since then had no BM, gas, pain in upper right side but unsure if that's related to her back. She stated that pain management recommended she reach out to pcp office to see if there is anything stronger she can take at this time. Please advise.

## 2024-10-14 RX ORDER — PANTOPRAZOLE SODIUM 40 MG/1
40 TABLET, DELAYED RELEASE ORAL DAILY
COMMUNITY

## 2024-10-14 NOTE — PRE-PROCEDURE INSTRUCTIONS
Pre-Surgery Instructions:   Medication Instructions    ammonium lactate (LAC-HYDRIN) 12 % cream Hold day of surgery.    azelastine (ASTELIN) 0.1 % nasal spray Uses PRN- OK to take day of surgery    B Complex Vitamins (VITAMIN B COMPLEX) TABS Hold day of surgery.    Cholecalciferol (VITAMIN D) 2000 units CAPS Hold day of surgery.    diclofenac sodium (VOLTAREN) 50 mg EC tablet Uses PRN- OK to take day of surgery    diltiazem (CARDIZEM) 30 mg tablet Take day of surgery.    fluticasone (FLONASE) 50 mcg/act nasal spray Take day of surgery.    lactulose (CHRONULAC) 10 g/15 mL solution Hold day of surgery.    Magnesium Gluconate 250 MG TABS Hold day of surgery.    ondansetron (ZOFRAN-ODT) 4 mg disintegrating tablet Hold day of surgery.    other medication, see sig, Hold day of surgery.    pantoprazole (PROTONIX) 40 mg tablet Take day of surgery.    Progesterone 100 MG CAPS Take night before surgery    rosuvastatin (CRESTOR) 5 mg tablet Take night before surgery    Medication instructions for day of procedure reviewed. Please use only a sip of water to take your instructed morning medications (if any).      You will receive a call one business day prior to procedure with an arrival time and hospital directions. If procedure is scheduled on a Monday, the hospital will be calling you on the Friday prior to your procedure. If you have not heard from anyone by 8pm, please call the hospital supervisor through the hospital  at [phone]. ([place] [phone]).     Please do not eat or drink after 11 pm the night before the MRI. Please take your medications with a sip of water at least 2 hours prior to their arrival time.     Please shower either the night prior to the procedure or the morning of the procedure. Dress in clean, comfortable clothes. All patients will be required to change into a hospital gown. Street clothes are not permitted in the MRI. Magnetic nail polish must be removed prior to the arrival.       Keep any valuables, jewelry, piercings at home.     Please bring your insurance cards, a form of photo ID, physician order, and a list of medications with you. Arrive 15 minutes prior to your given arrival time in order to register. Please bring any prior CT or MRI studies of the same area that were not performed at a [facility] along.      Arrange for a responsible person to drive patient to and from the hospital on the day of the procedure. Visitor Guidelines discussed.     Call the prescribing physician's office with any new illnesses, exposures, or additional questions prior to procedure.

## 2024-10-16 ENCOUNTER — HOSPITAL ENCOUNTER (OUTPATIENT)
Dept: ULTRASOUND IMAGING | Facility: MEDICAL CENTER | Age: 54
Discharge: HOME | End: 2024-10-16
Payer: OTHER GOVERNMENT

## 2024-10-16 ENCOUNTER — TELEPHONE (OUTPATIENT)
Dept: RADIOLOGY | Facility: MEDICAL CENTER | Age: 54
End: 2024-10-16

## 2024-10-16 DIAGNOSIS — M54.50 ACUTE BILATERAL LOW BACK PAIN WITHOUT SCIATICA: ICD-10-CM

## 2024-10-16 DIAGNOSIS — R10.9 FLANK PAIN: ICD-10-CM

## 2024-10-16 DIAGNOSIS — M54.14 THORACIC RADICULOPATHY: Primary | ICD-10-CM

## 2024-10-16 PROCEDURE — 76775 US EXAM ABDO BACK WALL LIM: CPT

## 2024-10-16 RX ORDER — ALPRAZOLAM 1 MG/1
TABLET ORAL
Qty: 1 TABLET | Refills: 0 | Status: SHIPPED | OUTPATIENT
Start: 2024-10-16 | End: 2024-10-16 | Stop reason: CLARIF

## 2024-10-16 NOTE — TELEPHONE ENCOUNTER
It is not good to be on an NSAID like that for an extended period of time.  Is not good for the kidneys or the stomach.  She should follow-up with me or the pain specialist with spine specialist.

## 2024-10-16 NOTE — TELEPHONE ENCOUNTER
Contacted pt to advise of same. Per pt she tried to reach procedure  to disregard request for open MRI and pre-med. Pt received a call back and is now able to have her MRI with Anesthesia on 10/31. Pt has an ov scheduled with her PCP on 10/23 for clearance.    Dr Suarez-Can order for MRI be placed for MRI with anesthesia please?

## 2024-10-16 NOTE — TELEPHONE ENCOUNTER
Patient called in stating she would like to change her MRI to an Open MRI and if she could have something to help her anxiety she maybe able to do it.

## 2024-10-23 ENCOUNTER — OFFICE VISIT (OUTPATIENT)
Age: 54
End: 2024-10-23
Payer: OTHER GOVERNMENT

## 2024-10-23 VITALS
TEMPERATURE: 98.7 F | OXYGEN SATURATION: 96 % | WEIGHT: 213 LBS | SYSTOLIC BLOOD PRESSURE: 122 MMHG | DIASTOLIC BLOOD PRESSURE: 60 MMHG | HEIGHT: 63 IN | BODY MASS INDEX: 37.74 KG/M2 | HEART RATE: 69 BPM

## 2024-10-23 DIAGNOSIS — K59.00 CONSTIPATION, UNSPECIFIED CONSTIPATION TYPE: ICD-10-CM

## 2024-10-23 DIAGNOSIS — Z01.818 PREOP EXAMINATION: Primary | ICD-10-CM

## 2024-10-23 PROCEDURE — 99214 OFFICE O/P EST MOD 30 MIN: CPT | Performed by: FAMILY MEDICINE

## 2024-10-23 RX ORDER — LACTULOSE 10 G/15ML
20 SOLUTION ORAL 2 TIMES DAILY PRN
Qty: 240 ML | Refills: 0 | Status: SHIPPED | OUTPATIENT
Start: 2024-10-23

## 2024-10-23 NOTE — PROGRESS NOTES
Pre-operative Clearance  Name: Jessica Banda      : 1970      MRN: [id number]  Encounter Provider: Kodak Choi DO  Encounter Date: 10/23/2024   Encounter department: [facility] PRIMARY CARE    Assessment & Plan  Constipation, unspecified constipation type  She does have generalized abdominal pain with any palpation.  She is having trouble moving her bowels she is very constipated.  I recommend she follow-up with her gastroenterologist this is possible.  In the meantime I do recommend a fleets enema  Orders:    lactulose (CHRONULAC) 10 g/15 mL solution; Take 30 mL (20 g total) by mouth 2 (two) times a day as needed (Constipation)    Preop examination  She is medically cleared for her sedation for the MRI.       Pre-operative Clearance:     Revised Cardiac Risk Index:  RCI RISK CLASS III (2 risk factors, risk of major cardiac complications approximately 3.6%)    Clearance:  Patient is medically optimized (CLEARED) for proposed surgery without any additional cardiac testing.       Patient is going to have an MRI of her back for which she requires sedation.  She is here for medical clearance for said sedation.  She states that she will be receiving IV sedation for the MRI.    Medication Instructions:   - 5HT3 Antagonist (ie, zofran):  Continue to take this medication on your normal schedule.   - Alpha-2 Adrenergic Agonist (ie, clonidine, tizanidine, methyldopa): Continue to take this medication on your normal schedule.  - Calcium channel blockers: Continue to take this medication on your normal schedule.  - Hyperlipidemia meds: Continue to take this medication on your normal schedule.       History of Present Illness     Patient presents with:  Pre-op Exam: Pt needs clearance for an MRI scheduled 10/31/24.   Pt also c/o constipation        Pre-op Exam    Pre-operative Risk Factors:    History of cerebrovascular disease: No    History of ischemic heart disease: Yes    Pre-operative treatment with  insulin: No  Pre-operative creatinine >2 mg/dL: No    History of congestive heart failure: No    Review of Systems   Constitutional:  Positive for activity change.   Respiratory: Negative.     Cardiovascular: Negative.    Gastrointestinal:  Positive for abdominal pain and constipation.   Musculoskeletal:  Positive for back pain.     Past Medical History   Past Medical History:   Diagnosis Date    Allergic     Allergic rhinitis     Anxiety     Arthritis     Depression     Eczema     Endometriosis     Fibromyalgia     GERD (gastroesophageal reflux disease)     Gestational diabetes     Herpes     Hyperlipidemia     Hypertension     Menopause ovarian failure     Migraine     Miscarriage     Nasal congestion     NSVT (nonsustained ventricular tachycardia) ([facility]) 2024    Obesity     Pulmonary embolism ([facility])     Was on hormone replacement therapy    s/p Medtronic loop recorder 2024    Sleep apnea     on CPAP treatment    Sleep difficulties     Tachycardia     Vasospasm ([facility])      Past Surgical History:   Procedure Laterality Date    BREAST BIOPSY      CARDIAC CATHETERIZATION N/A 2023    Procedure: Cardiac catheterization;  Surgeon: Keyonna Prescott DO;  Location: [facility];  Service: Cardiology    CARDIAC CATHETERIZATION N/A 2023    Procedure: Cardiac Coronary Angiogram;  Surgeon: Keyonna Prescott DO;  Location: [facility];  Service: Cardiology    CARDIAC CATHETERIZATION Left 2023    Procedure: Cardiac Left Heart Cath;  Surgeon: Keyonna Prescott DO;  Location: [facility];  Service: Cardiology    CARDIAC ELECTROPHYSIOLOGY PROCEDURE N/A 2024    Procedure: Cardiac loop recorder implant;  Surgeon: Caren Dumont PA-C;  Location:  CARDIAC CATH LAB;  Service: Cardiology    CARPAL TUNNEL RELEASE       SECTION  1991    CHOLECYSTECTOMY  2013    HEMORRHOID SURGERY  1998    KNEE SURGERY      MENISCECTOMY      TUBAL  LIGATION  2007    WISDOM TOOTH EXTRACTION       Family History   Problem Relation Age of Onset    Alzheimer's disease Father     Diabetes Father     Leukemia Mother     Cancer Mother         Leukemia     Social History     Tobacco Use    Smoking status: Never    Smokeless tobacco: Never   Vaping Use    Vaping status: Never Used   Substance and Sexual Activity    Alcohol use: Not Currently    Drug use: Never    Sexual activity: Not Currently     Partners: Male     Birth control/protection: Abstinence     Comment:  is not interested     Current Outpatient Medications on File Prior to Visit   Medication Sig    ammonium lactate (LAC-HYDRIN) 12 % cream APPLY TOPICALLY AS NEEDED FOR DRY SKIN    azelastine (ASTELIN) 0.1 % nasal spray 1 spray into each nostril 2 (two) times a day Use in each nostril as directed    diclofenac sodium (VOLTAREN) 50 mg EC tablet Take 1 tablet (50 mg total) by mouth 3 (three) times a day    diltiazem (CARDIZEM) 30 mg tablet TAKE 1 TABLET BY MOUTH TWICE A DAY    Magnesium Gluconate 250 MG TABS Take by mouth daily    ondansetron (ZOFRAN-ODT) 4 mg disintegrating tablet Take 1 tablet (4 mg total) by mouth every 8 (eight) hours as needed for nausea or vomiting    other medication, see sig, Medication/product name: testosterone 4 mg./ml cream   Strength: as above  Sig (include dose, route, frequency): apply 0.5 ml to inner thigh daily    rosuvastatin (CRESTOR) 5 mg tablet TAKE 1 TABLET DAILY    [DISCONTINUED] lactulose (CHRONULAC) 10 g/15 mL solution Take 30 mL (20 g total) by mouth 2 (two) times a day as needed (Constipation)    B Complex Vitamins (VITAMIN B COMPLEX) TABS Take by mouth (Patient not taking: Reported on 10/23/2024)    Cholecalciferol (VITAMIN D) 2000 units CAPS Take by mouth (Patient not taking: Reported on 10/23/2024)    Coenzyme Q10 10 MG capsule Take 10 mg by mouth daily (Patient not taking: Reported on 10/8/2024)    fluticasone (FLONASE) 50 mcg/act nasal spray 1 spray into  "each nostril 2 (two) times a day    nitrofurantoin (MACROBID) 100 mg capsule Take 1 capsule (100 mg total) by mouth 2 (two) times a day (Patient not taking: Reported on 10/8/2024)    Progesterone 100 MG CAPS Take 100 mg by mouth at bedtime (Patient not taking: Reported on 10/23/2024)    tiZANidine (ZANAFLEX) 2 mg tablet Take 1 tablet (2 mg total) by mouth every 8 (eight) hours as needed for muscle spasms (Patient not taking: Reported on 10/8/2024)     Allergies   Allergen Reactions    Acetazolamide Hives    Other Hives    Amoxicillin-Pot Clavulanate Hives    Iodinated Contrast Media      Needs to be prepped for IVC    Iothalamate Hives     Contrast dye    Oxycodone GI Intolerance and Vomiting    Sulfa Antibiotics Hives     Objective     /60 (BP Location: Right arm, Patient Position: Sitting, Cuff Size: Standard)   Pulse 69   Temp 98.7 °F (37.1 °C)   Ht 5' 3\" (1.6 m)   Wt 96.6 kg (213 lb)   LMP 01/12/2018 (Exact Date) Comment: post menopausal x 5 yrs.  SpO2 96%   BMI 37.73 kg/m²     Physical Exam  Vitals and nursing note reviewed.   Constitutional:       Appearance: She is well-developed. She is obese.   HENT:      Head: Normocephalic.   Eyes:      Pupils: Pupils are equal, round, and reactive to light.   Cardiovascular:      Rate and Rhythm: Normal rate and regular rhythm.      Heart sounds: Normal heart sounds.   Pulmonary:      Effort: Pulmonary effort is normal.      Breath sounds: Normal breath sounds.   Abdominal:      General: There is distension.      Tenderness: There is generalized abdominal tenderness.   Musculoskeletal:      Cervical back: Normal range of motion and neck supple.   Skin:     General: Skin is warm and dry.      Capillary Refill: Capillary refill takes less than 2 seconds.   Neurological:      General: No focal deficit present.      Mental Status: She is alert and oriented to person, place, and time.   Psychiatric:         Mood and Affect: Mood normal.         Behavior: Behavior " normal.           Kodak Matrone, DO

## 2024-10-23 NOTE — PATIENT INSTRUCTIONS
Pre-operative Medication Instructions    Avoid herbs or non-directed vitamins one week prior to surgery  Avoid aspirin containing medications or non-steroidal anti-inflammatory drugs one week preceding surgery  May take tylenol for pain up until the night before surgery    5HT3 Antagonists     Medication Name     ondansetron (ZOFRAN-ODT) 4 mg disintegrating tablet      Continue to take this medication on your normal schedule.  If this is an oral medication and you take it in the morning, then you may take this medicine with a sip of water.    Alpha-2 Adrenergic Agonist     Medication Name     tiZANidine (ZANAFLEX) 2 mg tablet      Continue to take this medication on your normal schedule.  If this is an oral medication and you take it in the morning, then you may take this medicine with a sip of water.    Calcium Channel Blockers     Medication Name     diltiazem (CARDIZEM) 30 mg tablet      Continue to take this heart medication on your normal schedule.  If this is an oral medication and you take it in the morning, then you may take this medicine with a sip of water.    Cholesterol lowering meds     Medication Name     rosuvastatin (CRESTOR) 5 mg tablet      Continue to take this medication on your normal schedule.  If this is an oral medication and you take it in the morning, then you may take this medicine with a sip of water.

## 2024-10-24 ENCOUNTER — HOSPITAL ENCOUNTER (EMERGENCY)
Facility: HOSPITAL | Age: 54
Discharge: HOME | End: 2024-10-24
Attending: INTERNAL MEDICINE | Admitting: INTERNAL MEDICINE
Payer: OTHER GOVERNMENT

## 2024-10-24 ENCOUNTER — APPOINTMENT (EMERGENCY)
Dept: CT IMAGING | Facility: HOSPITAL | Age: 54
End: 2024-10-24
Payer: OTHER GOVERNMENT

## 2024-10-24 VITALS
HEART RATE: 81 BPM | HEIGHT: 63 IN | DIASTOLIC BLOOD PRESSURE: 60 MMHG | OXYGEN SATURATION: 96 % | SYSTOLIC BLOOD PRESSURE: 122 MMHG | BODY MASS INDEX: 36.56 KG/M2 | WEIGHT: 206.35 LBS | RESPIRATION RATE: 18 BRPM

## 2024-10-24 DIAGNOSIS — K20.90 ESOPHAGITIS: ICD-10-CM

## 2024-10-24 DIAGNOSIS — R10.9 ABDOMINAL PAIN: Primary | ICD-10-CM

## 2024-10-24 LAB
ALBUMIN SERPL BCG-MCNC: 4.1 G/DL (ref 3.5–5)
ALP SERPL-CCNC: 74 U/L (ref 34–104)
ALT SERPL W P-5'-P-CCNC: 30 U/L (ref 7–52)
ANION GAP SERPL CALCULATED.3IONS-SCNC: 8 MMOL/L (ref 4–13)
AST SERPL W P-5'-P-CCNC: 34 U/L (ref 13–39)
BASOPHILS # BLD AUTO: 0.03 THOUSANDS/ΜL (ref 0–0.1)
BASOPHILS NFR BLD AUTO: 1 % (ref 0–1)
BILIRUB SERPL-MCNC: 0.62 MG/DL (ref 0.2–1)
BILIRUB UR QL STRIP: NEGATIVE
BUN SERPL-MCNC: 8 MG/DL (ref 5–25)
CALCIUM SERPL-MCNC: 9.3 MG/DL (ref 8.4–10.2)
CHLORIDE SERPL-SCNC: 107 MMOL/L (ref 96–108)
CLARITY UR: CLEAR
CO2 SERPL-SCNC: 27 MMOL/L (ref 21–32)
COLOR UR: COLORLESS
CREAT SERPL-MCNC: 0.74 MG/DL (ref 0.6–1.3)
EOSINOPHIL # BLD AUTO: 0.15 THOUSAND/ΜL (ref 0–0.61)
EOSINOPHIL NFR BLD AUTO: 2 % (ref 0–6)
ERYTHROCYTE [DISTWIDTH] IN BLOOD BY AUTOMATED COUNT: 12.9 % (ref 11.6–15.1)
GFR SERPL CREATININE-BSD FRML MDRD: 90 ML/MIN/1.73SQ M
GLUCOSE SERPL-MCNC: 104 MG/DL (ref 65–140)
GLUCOSE UR STRIP-MCNC: NEGATIVE MG/DL
HCT VFR BLD AUTO: 41.4 % (ref 34.8–46.1)
HGB BLD-MCNC: 13.9 G/DL (ref 11.5–15.4)
HGB UR QL STRIP.AUTO: NEGATIVE
IMM GRANULOCYTES # BLD AUTO: 0.02 THOUSAND/UL (ref 0–0.2)
IMM GRANULOCYTES NFR BLD AUTO: 0 % (ref 0–2)
KETONES UR STRIP-MCNC: NEGATIVE MG/DL
LEUKOCYTE ESTERASE UR QL STRIP: NEGATIVE
LIPASE SERPL-CCNC: 13 U/L (ref 11–82)
LYMPHOCYTES # BLD AUTO: 1.63 THOUSANDS/ΜL (ref 0.6–4.47)
LYMPHOCYTES NFR BLD AUTO: 25 % (ref 14–44)
MCH RBC QN AUTO: 29.8 PG (ref 26.8–34.3)
MCHC RBC AUTO-ENTMCNC: 33.6 G/DL (ref 31.4–37.4)
MCV RBC AUTO: 89 FL (ref 82–98)
MONOCYTES # BLD AUTO: 0.37 THOUSAND/ΜL (ref 0.17–1.22)
MONOCYTES NFR BLD AUTO: 6 % (ref 4–12)
NEUTROPHILS # BLD AUTO: 4.33 THOUSANDS/ΜL (ref 1.85–7.62)
NEUTS SEG NFR BLD AUTO: 66 % (ref 43–75)
NITRITE UR QL STRIP: NEGATIVE
NRBC BLD AUTO-RTO: 0 /100 WBCS
PH UR STRIP.AUTO: 7 [PH]
PLATELET # BLD AUTO: 263 THOUSANDS/UL (ref 149–390)
PMV BLD AUTO: 10.2 FL (ref 8.9–12.7)
POTASSIUM SERPL-SCNC: 4.3 MMOL/L (ref 3.5–5.3)
PROT SERPL-MCNC: 7.2 G/DL (ref 6.4–8.4)
PROT UR STRIP-MCNC: NEGATIVE MG/DL
RBC # BLD AUTO: 4.66 MILLION/UL (ref 3.81–5.12)
SODIUM SERPL-SCNC: 142 MMOL/L (ref 135–147)
SP GR UR STRIP.AUTO: 1 (ref 1–1.03)
UROBILINOGEN UR STRIP-ACNC: <2 MG/DL
WBC # BLD AUTO: 6.53 THOUSAND/UL (ref 4.31–10.16)

## 2024-10-24 PROCEDURE — 80053 COMPREHEN METABOLIC PANEL: CPT | Performed by: INTERNAL MEDICINE

## 2024-10-24 PROCEDURE — 36415 COLL VENOUS BLD VENIPUNCTURE: CPT | Performed by: INTERNAL MEDICINE

## 2024-10-24 PROCEDURE — 99284 EMERGENCY DEPT VISIT MOD MDM: CPT | Performed by: INTERNAL MEDICINE

## 2024-10-24 PROCEDURE — 83690 ASSAY OF LIPASE: CPT | Performed by: INTERNAL MEDICINE

## 2024-10-24 PROCEDURE — 96375 TX/PRO/DX INJ NEW DRUG ADDON: CPT

## 2024-10-24 PROCEDURE — 85025 COMPLETE CBC W/AUTO DIFF WBC: CPT | Performed by: INTERNAL MEDICINE

## 2024-10-24 PROCEDURE — 96374 THER/PROPH/DIAG INJ IV PUSH: CPT

## 2024-10-24 PROCEDURE — 74176 CT ABD & PELVIS W/O CONTRAST: CPT

## 2024-10-24 PROCEDURE — 96361 HYDRATE IV INFUSION ADD-ON: CPT

## 2024-10-24 PROCEDURE — 81003 URINALYSIS AUTO W/O SCOPE: CPT | Performed by: INTERNAL MEDICINE

## 2024-10-24 PROCEDURE — 99284 EMERGENCY DEPT VISIT MOD MDM: CPT

## 2024-10-24 PROCEDURE — 71250 CT THORAX DX C-: CPT

## 2024-10-24 RX ORDER — ONDANSETRON 2 MG/ML
4 INJECTION INTRAMUSCULAR; INTRAVENOUS ONCE
Status: COMPLETED | OUTPATIENT
Start: 2024-10-24 | End: 2024-10-24

## 2024-10-24 RX ORDER — SUCRALFATE 1 G/1
1 TABLET ORAL 3 TIMES DAILY PRN
Qty: 30 TABLET | Refills: 0 | Status: SHIPPED | OUTPATIENT
Start: 2024-10-24

## 2024-10-24 RX ORDER — KETOROLAC TROMETHAMINE 30 MG/ML
15 INJECTION, SOLUTION INTRAMUSCULAR; INTRAVENOUS ONCE
Status: COMPLETED | OUTPATIENT
Start: 2024-10-24 | End: 2024-10-24

## 2024-10-24 RX ADMIN — KETOROLAC TROMETHAMINE 15 MG: 30 INJECTION, SOLUTION INTRAMUSCULAR; INTRAVENOUS at 17:26

## 2024-10-24 RX ADMIN — SODIUM CHLORIDE 1000 ML: 0.9 INJECTION, SOLUTION INTRAVENOUS at 17:26

## 2024-10-24 RX ADMIN — ONDANSETRON 4 MG: 2 INJECTION INTRAMUSCULAR; INTRAVENOUS at 17:27

## 2024-10-24 RX ADMIN — IOHEXOL 50 ML: 240 INJECTION, SOLUTION INTRATHECAL; INTRAVASCULAR; INTRAVENOUS; ORAL at 18:55

## 2024-10-24 NOTE — ED PROVIDER NOTES
Time reflects when diagnosis was documented in both MDM as applicable and the Disposition within this note       Time User Action Codes Description Comment    10/24/2024  9:04 PM Lisa Diaz Add [R10.9] Abdominal pain     10/24/2024  9:04 PM Lisa Diaz Add [K20.90] Esophagitis           ED Disposition       ED Disposition   Discharge    Condition   Stable    Date/Time   u Oct 24, 2024  9:04 PM    Comment   Jessica Banda discharge to home/self care.                   Assessment & Plan       Medical Decision Making  Work-up to include blood work, CBC as marker of infectivity, hemoconcentration for hydration status, CMP to check for electrolytes, renal function, liver function, Lipase to check for pancreatitis, CT scan to evaluate for potential intra-abdominal surgical pathology, will include chest to evaluate the esophagus and any other chest pathology.     Workup significant for esophagitis, which patient is aware that she has.  Patient started to please follow-up with GI in 2 weeks as scheduled, take Carafate and Maalox as needed, continue your PPI and return to the ER for worsening abdominal pain, intractable nausea or vomiting, fevers or development of new symptoms.    Amount and/or Complexity of Data Reviewed  External Data Reviewed: labs, radiology and notes.  Labs: ordered.  Radiology: ordered.    Risk  Prescription drug management.             Medications   sodium chloride 0.9 % bolus 1,000 mL (0 mL Intravenous Stopped 10/24/24 1826)   ketorolac (TORADOL) injection 15 mg (15 mg Intravenous Given 10/24/24 1726)   ondansetron (ZOFRAN) injection 4 mg (4 mg Intravenous Given 10/24/24 1727)   iohexol (OMNIPAQUE) 240 MG/ML solution 50 mL (50 mL Oral Given 10/24/24 1855)       ED Risk Strat Scores                           SBIRT 20yo+      Flowsheet Row Most Recent Value   Initial Alcohol Screen: US AUDIT-C     1. How often do you have a drink containing alcohol? 0 Filed at: 10/24/2024 1734   2. How many  drinks containing alcohol do you have on a typical day you are drinking?  0 Filed at: 10/24/2024 1734   3a. Male UNDER 65: How often do you have five or more drinks on one occasion? 0 Filed at: 10/24/2024 1734   3b. FEMALE Any Age, or MALE 65+: How often do you have 4 or more drinks on one occassion? 0 Filed at: 10/24/2024 1734   Audit-C Score 0 Filed at: 10/24/2024 1734   AVNI: How many times in the past year have you...    Used an illegal drug or used a prescription medication for non-medical reasons? Never Filed at: 10/24/2024 1734                            History of Present Illness       Chief Complaint   Patient presents with    Abdominal Pain     Pt reports ongoing intermittent constipation, referred here by PCP for possible bowel obstruction. Taking lactulose 4 weeks, hasn't eaten in 2 days, now only passing liquid/diarrhea but still with bloating and generalized abd pain. Denies vomiting, endorses nausea, taking zofran.        Past Medical History:   Diagnosis Date    Allergic     Allergic rhinitis     Anxiety     Arthritis     Depression     Eczema     Endometriosis     Fibromyalgia     GERD (gastroesophageal reflux disease)     Gestational diabetes 2000    Herpes 1996    Hyperlipidemia     Hypertension     Menopause ovarian failure     Migraine     Miscarriage     Nasal congestion     NSVT (nonsustained ventricular tachycardia) (HCC) 01/27/2024    Obesity     Pulmonary embolism (HCC) 2015    Was on hormone replacement therapy    s/p [employer] loop recorder 1/27/2024 01/27/2024    Sleep apnea     on CPAP treatment    Sleep difficulties     Tachycardia     Vasospasm (HCC)       Past Surgical History:   Procedure Laterality Date    BREAST BIOPSY  2008    CARDIAC CATHETERIZATION N/A 11/27/2023    Procedure: Cardiac catheterization;  Surgeon: Keyonna Prescott DO;  Location: [facility];  Service: Cardiology    CARDIAC CATHETERIZATION N/A 11/27/2023    Procedure: Cardiac Coronary Angiogram;  Surgeon:  "Keyonna Prescott DO;  Location: [facility];  Service: Cardiology    CARDIAC CATHETERIZATION Left 2023    Procedure: Cardiac Left Heart Cath;  Surgeon: Keyonna Prescott DO;  Location: [facility];  Service: Cardiology    CARDIAC ELECTROPHYSIOLOGY PROCEDURE N/A 2024    Procedure: Cardiac loop recorder implant;  Surgeon: Caren Dumont PA-C;  Location: [facility];  Service: Cardiology    CARPAL TUNNEL RELEASE       SECTION  1991    CHOLECYSTECTOMY  2013    HEMORRHOID SURGERY  1998    KNEE SURGERY      MENISCECTOMY      TUBAL LIGATION  2007    WISDOM TOOTH EXTRACTION        Family History   Problem Relation Age of Onset    Alzheimer's disease Father     Diabetes Father     Leukemia Mother     Cancer Mother         Leukemia      Social History     Tobacco Use    Smoking status: Never    Smokeless tobacco: Never   Vaping Use    Vaping status: Never Used   Substance Use Topics    Alcohol use: Not Currently    Drug use: Never      E-Cigarette/Vaping    E-Cigarette Use Never User       E-Cigarette/Vaping Substances    Nicotine No     THC No     CBD No     Flavoring No     Other No     Unknown No       I have reviewed and agree with the history as documented.     54-year-old woman presents to ED for evaluation of abdominal pain, abdominal distention, low appetite, intermittent diarrhea and intermittent constipation.  Patient reports that she has diffuse abdominal pain along with distention.  She states she Feels like she cannot eat anything as her abdomen is distended.  She has been constipated \" for a long time\" when taking lactulose for the last 4 weeks.  She reports liquid diarrhea without any melena or hematochezia.  She reports nausea that was relieved by Zofran at home, denies any associated vomiting.  She has had no previous abdominal surgeries. Patient denies headache, visual changes, fevers, chills, chest pain, palpitations, dysuria, new skin rashes or " numbness or tingling of the extremities.      Abdominal Pain      Review of Systems   Gastrointestinal:  Positive for abdominal pain.   All other systems reviewed and are negative.          Objective       ED Triage Vitals   Temp Pulse Blood Pressure Respirations SpO2 Patient Position - Orthostatic VS   -- 10/24/24 1654 10/24/24 1654 10/24/24 1654 10/24/24 1654 10/24/24 1654    84 132/85 18 95 % Sitting      Temp src Heart Rate Source BP Location FiO2 (%) Pain Score    -- 10/24/24 1654 10/24/24 1654 -- 10/24/24 1726     Monitor Right arm  10 - Worst Possible Pain      Vitals      Date and Time Temp Pulse SpO2 Resp BP Pain Score FACES Pain Rating User   10/24/24 1946 -- 81 96 % -- 122/60 -- -- OG   10/24/24 1726 -- -- -- -- -- 10 - Worst Possible Pain -- SS   10/24/24 1654 -- 84 95 % 18 132/85 -- -- DW            Physical Exam  PHYSICAL EXAM    Constitutional:  Well developed, no acute distress  HEENT:  Conjunctiva normal. Oropharynx moist  Respiratory:  No respiratory distress  Cardiovascular:  Normal rate  GI:  Soft, nondistended, tender to palpation diffusely, no guarding, no rebound, no rigidity  :  No costovertebral angle tenderness   Musculoskeletal:  No edema, no tenderness, no deformities  Integument:  Well hydrated, no rash   Lymphatic:  No lymphadenopathy noted   Neurologic:  Alert & oriented x 3, normal motor function, no focal deficits noted   Psychiatric:  Speech and behavior appropriate       Results Reviewed       Procedure Component Value Units Date/Time    UA w Reflex to Microscopic w Reflex to Culture [[id number]] Collected: 10/24/24 1754    Lab Status: Final result Specimen: Urine, Clean Catch Updated: 10/24/24 1817     Color, UA Colorless     Clarity, UA Clear     Specific Gravity, UA 1.004     pH, UA 7.0     Leukocytes, UA Negative     Nitrite, UA Negative     Protein, UA Negative mg/dl      Glucose, UA Negative mg/dl      Ketones, UA Negative mg/dl      Urobilinogen, UA <2.0 mg/dl       Bilirubin, UA Negative     Occult Blood, UA Negative    Comprehensive metabolic panel [[id number]] Collected: 10/24/24 1725    Lab Status: Final result Specimen: Blood from Arm, Left Updated: 10/24/24 1802     Sodium 142 mmol/L      Potassium 4.3 mmol/L      Chloride 107 mmol/L      CO2 27 mmol/L      ANION GAP 8 mmol/L      BUN 8 mg/dL      Creatinine 0.74 mg/dL      Glucose 104 mg/dL      Calcium 9.3 mg/dL      AST 34 U/L      ALT 30 U/L      Alkaline Phosphatase 74 U/L      Total Protein 7.2 g/dL      Albumin 4.1 g/dL      Total Bilirubin 0.62 mg/dL      eGFR 90 ml/min/1.73sq m     Narrative:      National Kidney Disease Foundation guidelines for Chronic Kidney Disease (CKD):     Stage 1 with normal or high GFR (GFR > 90 mL/min/1.73 square meters)    Stage 2 Mild CKD (GFR = 60-89 mL/min/1.73 square meters)    Stage 3A Moderate CKD (GFR = 45-59 mL/min/1.73 square meters)    Stage 3B Moderate CKD (GFR = 30-44 mL/min/1.73 square meters)    Stage 4 Severe CKD (GFR = 15-29 mL/min/1.73 square meters)    Stage 5 End Stage CKD (GFR <15 mL/min/1.73 square meters)  Note: GFR calculation is accurate only with a steady state creatinine    Lipase [[id number]]  (Normal) Collected: 10/24/24 1725    Lab Status: Final result Specimen: Blood from Arm, Left Updated: 10/24/24 1802     Lipase 13 u/L     CBC and differential [[id number]] Collected: 10/24/24 1725    Lab Status: Final result Specimen: Blood from Arm, Left Updated: 10/24/24 1739     WBC 6.53 Thousand/uL      RBC 4.66 Million/uL      Hemoglobin 13.9 g/dL      Hematocrit 41.4 %      MCV 89 fL      MCH 29.8 pg      MCHC 33.6 g/dL      RDW 12.9 %      MPV 10.2 fL      Platelets 263 Thousands/uL      nRBC 0 /100 WBCs      Segmented % 66 %      Immature Grans % 0 %      Lymphocytes % 25 %      Monocytes % 6 %      Eosinophils Relative 2 %      Basophils Relative 1 %      Absolute Neutrophils 4.33 Thousands/µL      Absolute Immature Grans 0.02 Thousand/uL      Absolute Lymphocytes  1.63 Thousands/µL      Absolute Monocytes 0.37 Thousand/µL      Eosinophils Absolute 0.15 Thousand/µL      Basophils Absolute 0.03 Thousands/µL             CT chest abdomen pelvis wo contrast   Final Interpretation by Dane Moreno MD (10/24 2017)      -Small hiatal hernia. Mild circumferential wall thickening of the distal esophagus, which may be related to esophagitis.      -Colonic diverticulosis without evidence of diverticulitis.      -New 4 mm left lower lobe pulmonary nodule. Based on current Fleischner Society 2017 Guidelines on incidental pulmonary nodule, no routine follow-up is needed if the patient is low risk. If the patient is high risk, optional follow-up chest CT at 12    months can be considered.         Workstation performed: [id number]             Procedures    ED Medication and Procedure Management   Prior to Admission Medications   Prescriptions Last Dose Informant Patient Reported? Taking?   B Complex Vitamins (VITAMIN B COMPLEX) TABS  Self Yes No   Sig: Take by mouth   Patient not taking: Reported on 10/23/2024   Cholecalciferol (VITAMIN D) 2000 units CAPS  Self Yes No   Sig: Take by mouth   Patient not taking: Reported on 10/23/2024   Coenzyme Q10 10 MG capsule  Self Yes No   Sig: Take 10 mg by mouth daily   Patient not taking: Reported on 10/8/2024   Magnesium Gluconate 250 MG TABS   Yes No   Sig: Take by mouth daily   Progesterone 100 MG CAPS   No No   Sig: Take 100 mg by mouth at bedtime   Patient not taking: Reported on 10/23/2024   ammonium lactate (LAC-HYDRIN) 12 % cream   No No   Sig: APPLY TOPICALLY AS NEEDED FOR DRY SKIN   azelastine (ASTELIN) 0.1 % nasal spray  Self No No   Si spray into each nostril 2 (two) times a day Use in each nostril as directed   diclofenac sodium (VOLTAREN) 50 mg EC tablet   No No   Sig: Take 1 tablet (50 mg total) by mouth 3 (three) times a day   diltiazem (CARDIZEM) 30 mg tablet   No No   Sig: TAKE 1 TABLET BY MOUTH TWICE A DAY   fluticasone (FLONASE) 50  mcg/act nasal spray  Self No No   Si spray into each nostril 2 (two) times a day   lactulose (CHRONULAC) 10 g/15 mL solution   No No   Sig: Take 30 mL (20 g total) by mouth 2 (two) times a day as needed (Constipation)   nitrofurantoin (MACROBID) 100 mg capsule   No No   Sig: Take 1 capsule (100 mg total) by mouth 2 (two) times a day   Patient not taking: Reported on 10/8/2024   ondansetron (ZOFRAN-ODT) 4 mg disintegrating tablet   No No   Sig: Take 1 tablet (4 mg total) by mouth every 8 (eight) hours as needed for nausea or vomiting   other medication, see sig,   No No   Sig: Medication/product name: testosterone 4 mg./ml cream   Strength: as above  Sig (include dose, route, frequency): apply 0.5 ml to inner thigh daily   pantoprazole (PROTONIX) 40 mg tablet   Yes No   Sig: Take 40 mg by mouth daily   rosuvastatin (CRESTOR) 5 mg tablet   No No   Sig: TAKE 1 TABLET DAILY   tiZANidine (ZANAFLEX) 2 mg tablet   No No   Sig: Take 1 tablet (2 mg total) by mouth every 8 (eight) hours as needed for muscle spasms   Patient not taking: Reported on 10/8/2024      Facility-Administered Medications: None     Discharge Medication List as of 10/24/2024  9:06 PM        START taking these medications    Details   sucralfate (CARAFATE) 1 g tablet Take 1 tablet (1 g total) by mouth 3 (three) times a day as needed (reflux), Starting u 10/24/2024, Normal           CONTINUE these medications which have NOT CHANGED    Details   ammonium lactate (LAC-HYDRIN) 12 % cream APPLY TOPICALLY AS NEEDED FOR DRY SKIN, Starting Thu 2024, Normal      azelastine (ASTELIN) 0.1 % nasal spray 1 spray into each nostril 2 (two) times a day Use in each nostril as directed, Starting Mon 2024, Until Wed 10/23/2024, Normal      B Complex Vitamins (VITAMIN B COMPLEX) TABS Take by mouth, Historical Med      Cholecalciferol (VITAMIN D) 2000 units CAPS Take by mouth, Historical Med      Coenzyme Q10 10 MG capsule Take 10 mg by mouth daily, Starting  Mon 10/3/2022, Historical Med      diclofenac sodium (VOLTAREN) 50 mg EC tablet Take 1 tablet (50 mg total) by mouth 3 (three) times a day, Starting Wed 9/25/2024, Normal      diltiazem (CARDIZEM) 30 mg tablet TAKE 1 TABLET BY MOUTH TWICE A DAY, Starting Fri 8/2/2024, Normal      fluticasone (FLONASE) 50 mcg/act nasal spray 1 spray into each nostril 2 (two) times a day, Starting Wed 10/9/2019, Normal      lactulose (CHRONULAC) 10 g/15 mL solution Take 30 mL (20 g total) by mouth 2 (two) times a day as needed (Constipation), Starting Wed 10/23/2024, Normal      Magnesium Gluconate 250 MG TABS Take by mouth daily, Historical Med      nitrofurantoin (MACROBID) 100 mg capsule Take 1 capsule (100 mg total) by mouth 2 (two) times a day, Starting Wed 9/25/2024, Normal      ondansetron (ZOFRAN-ODT) 4 mg disintegrating tablet Take 1 tablet (4 mg total) by mouth every 8 (eight) hours as needed for nausea or vomiting, Starting Thu 8/29/2024, Normal      other medication, see sig, Medication/product name: testosterone 4 mg./ml cream   Strength: as above  Sig (include dose, route, frequency): apply 0.5 ml to inner thigh daily, Phone In      pantoprazole (PROTONIX) 40 mg tablet Take 40 mg by mouth daily, Historical Med      Progesterone 100 MG CAPS Take 100 mg by mouth at bedtime, Starting Mon 9/30/2024, Normal      rosuvastatin (CRESTOR) 5 mg tablet TAKE 1 TABLET DAILY, Starting Thu 9/12/2024, Normal      tiZANidine (ZANAFLEX) 2 mg tablet Take 1 tablet (2 mg total) by mouth every 8 (eight) hours as needed for muscle spasms, Starting Tue 9/3/2024, Normal           No discharge procedures on file.  ED SEPSIS DOCUMENTATION   Time reflects when diagnosis was documented in both MDM as applicable and the Disposition within this note       Time User Action Codes Description Comment    10/24/2024  9:04 PM Lisa Diaz [R10.9] Abdominal pain     10/24/2024  9:04 PM Emily, Lisa Add [K20.90] Esophagitis                  Lisa  MD Emily  10/24/24 1225

## 2024-10-24 NOTE — Clinical Note
Jessica Banda was seen and treated in our emergency department on 10/24/2024.                Diagnosis:     Jessica  .    She may return on this date: 10/28/2024         If you have any questions or concerns, please don't hesitate to call.      Lisa Diaz MD    ______________________________           _______________          _______________  Hospital Representative                              Date                                Time

## 2024-10-25 DIAGNOSIS — M54.50 ACUTE BILATERAL LOW BACK PAIN WITHOUT SCIATICA: ICD-10-CM

## 2024-10-25 NOTE — DISCHARGE INSTRUCTIONS
Please follow-up with GI in 2 weeks as scheduled    Please take Carafate and Maalox as needed, continue your PPI    Return to the ER for worsening abdominal pain, intractable nausea or vomiting, fevers or development of new symptoms

## 2024-10-30 DIAGNOSIS — M54.50 ACUTE BILATERAL LOW BACK PAIN WITHOUT SCIATICA: ICD-10-CM

## 2024-10-30 NOTE — TELEPHONE ENCOUNTER
I have refused this 5 days ago and 2 weeks ago.  I do not believe a refill is appropriate.  She would need to see me discuss proper therapy.

## 2024-10-31 ENCOUNTER — HOSPITAL ENCOUNTER (OUTPATIENT)
Dept: RADIOLOGY | Facility: HOSPITAL | Age: 54
Discharge: HOME | End: 2024-10-31
Attending: ANESTHESIOLOGY
Payer: OTHER GOVERNMENT

## 2024-10-31 ENCOUNTER — ANESTHESIA EVENT (OUTPATIENT)
Dept: RADIOLOGY | Facility: HOSPITAL | Age: 54
End: 2024-10-31
Payer: OTHER GOVERNMENT

## 2024-10-31 ENCOUNTER — ANESTHESIA (OUTPATIENT)
Dept: RADIOLOGY | Facility: HOSPITAL | Age: 54
End: 2024-10-31
Payer: OTHER GOVERNMENT

## 2024-10-31 VITALS
TEMPERATURE: 97.1 F | HEART RATE: 70 BPM | WEIGHT: 205 LBS | DIASTOLIC BLOOD PRESSURE: 65 MMHG | RESPIRATION RATE: 20 BRPM | BODY MASS INDEX: 36.32 KG/M2 | SYSTOLIC BLOOD PRESSURE: 130 MMHG | HEIGHT: 63 IN | OXYGEN SATURATION: 100 %

## 2024-10-31 DIAGNOSIS — M54.14 THORACIC RADICULOPATHY: ICD-10-CM

## 2024-10-31 PROCEDURE — 72146 MRI CHEST SPINE W/O DYE: CPT

## 2024-10-31 RX ORDER — SODIUM CHLORIDE, SODIUM LACTATE, POTASSIUM CHLORIDE, CALCIUM CHLORIDE 600; 310; 30; 20 MG/100ML; MG/100ML; MG/100ML; MG/100ML
125 INJECTION, SOLUTION INTRAVENOUS CONTINUOUS
Status: DISCONTINUED | OUTPATIENT
Start: 2024-10-31 | End: 2024-10-31

## 2024-10-31 RX ORDER — PROPOFOL 10 MG/ML
INJECTION, EMULSION INTRAVENOUS AS NEEDED
Status: DISCONTINUED | OUTPATIENT
Start: 2024-10-31 | End: 2024-10-31

## 2024-10-31 RX ORDER — LIDOCAINE HYDROCHLORIDE 10 MG/ML
INJECTION, SOLUTION EPIDURAL; INFILTRATION; INTRACAUDAL; PERINEURAL AS NEEDED
Status: DISCONTINUED | OUTPATIENT
Start: 2024-10-31 | End: 2024-10-31

## 2024-10-31 RX ORDER — ONDANSETRON 2 MG/ML
INJECTION INTRAMUSCULAR; INTRAVENOUS AS NEEDED
Status: DISCONTINUED | OUTPATIENT
Start: 2024-10-31 | End: 2024-10-31

## 2024-10-31 RX ADMIN — LIDOCAINE HYDROCHLORIDE 50 MG: 10 INJECTION, SOLUTION EPIDURAL; INFILTRATION; INTRACAUDAL; PERINEURAL at 10:31

## 2024-10-31 RX ADMIN — ONDANSETRON 4 MG: 2 INJECTION INTRAMUSCULAR; INTRAVENOUS at 10:38

## 2024-10-31 RX ADMIN — PROPOFOL 150 MG: 10 INJECTION, EMULSION INTRAVENOUS at 10:31

## 2024-10-31 RX ADMIN — SODIUM CHLORIDE, SODIUM LACTATE, POTASSIUM CHLORIDE, AND CALCIUM CHLORIDE 125 ML/HR: .6; .31; .03; .02 INJECTION, SOLUTION INTRAVENOUS at 09:35

## 2024-10-31 NOTE — ANESTHESIA POSTPROCEDURE EVALUATION
Post-Op Assessment Note    CV Status:  Stable  Pain Score: 0    Pain management: adequate       Mental Status:  Alert and awake   Hydration Status:  Euvolemic   PONV Controlled:  Controlled   Airway Patency:  Patent     Post Op Vitals Reviewed: Yes    No anethesia notable event occurred.    Staff: CRNA           Last Filed PACU Vitals:  Vitals Value Taken Time   Temp     Pulse 97    /77    Resp 15    SpO2 98%        Modified Meghan:  Activity: 2 (10/31/2024 11:25 AM)  Respiration: 2 (10/31/2024 11:25 AM)  Circulation: 2 (10/31/2024 11:25 AM)  Consciousness: 2 (10/31/2024 11:25 AM)  Oxygen Saturation: 2 (10/31/2024 11:25 AM)  Modified Meghan Score: 10 (10/31/2024 11:25 AM)

## 2024-10-31 NOTE — NURSING NOTE
Thoracic spine MRI w/o contrast completed and patient tolerated procedure well. Post-procedure vital signs taken and recorded. Report given to [facility] nurse  Angela. Patient placed in for transport to be taken to [facility]. Patient complaining of a headache in recovery, anesthesia notified fluids opened in recovery. [facility] RN notified and aware to reach out to provider if it doesn't resolve with eating and fluids. Patient offers no additional complaints or verbalizes any issues upon leaving MRI. Patient placed her glasses on her face in recovery.

## 2024-10-31 NOTE — ANESTHESIA POSTPROCEDURE EVALUATION
Post-Op Assessment Note    CV Status:  Stable  Pain Score: 0    Pain management: adequate       Mental Status:  Alert and awake   Hydration Status:  Euvolemic   PONV Controlled:  Controlled   Airway Patency:  Patent     Post Op Vitals Reviewed: Yes    No anethesia notable event occurred.    Staff: CRNA           Last Filed PACU Vitals:  Vitals Value Taken Time   Temp     Pulse 97    /77    Resp 15    SpO2 98%        Modified Meghan:  No data recorded

## 2024-10-31 NOTE — ANESTHESIA PREPROCEDURE EVALUATION
Procedure:  MRI THORACIC SPINE WO CONTRAST    Relevant Problems   ANESTHESIA (within normal limits)      CARDIO   (+) Hyperlipidemia   (+) Non-cardiac chest pain   (+) Vasospastic angina (HCC)      GI/HEPATIC   (+) Acute pancreatitis   (+) GERD (gastroesophageal reflux disease)   (+) Hepatic steatosis      MUSCULOSKELETAL   (+) Fibromyalgia   (+) Low back pain      NEURO/PSYCH   (+) Anxiety   (+) Fibromyalgia   (+) Panic disorder      PULMONARY   (+) Sleep apnea        Physical Exam    Airway    Mallampati score: III  TM Distance: >3 FB  Neck ROM: full     Dental   No notable dental hx     Cardiovascular  Rhythm: regular, Rate: normal, Cardiovascular exam normal    Pulmonary   Breath sounds clear to auscultation    Other Findings  post-pubertal.      Anesthesia Plan  ASA Score- 3     Anesthesia Type- general with ASA Monitors.         Additional Monitors:     Airway Plan: LMA.           Plan Factors-Exercise tolerance (METS): >4 METS.    Chart reviewed. EKG reviewed. Imaging results reviewed. Existing labs reviewed. Patient summary reviewed.    Patient is not a current smoker.  Patient instructed to abstain from smoking on day of procedure. Patient did not smoke on day of surgery.    Obstructive sleep apnea risk education given perioperatively.        Induction- intravenous.    Postoperative Plan- . Planned trial extubation    Perioperative Resuscitation Plan - Level 1 - Full Code.       Informed Consent- Anesthetic plan and risks discussed with patient.  I personally reviewed this patient with the CRNA. Discussed and agreed on the Anesthesia Plan with the CRNA..

## 2024-11-10 ENCOUNTER — HOSPITAL ENCOUNTER (EMERGENCY)
Facility: HOSPITAL | Age: 54
Discharge: HOME | End: 2024-11-10
Attending: EMERGENCY MEDICINE
Payer: OTHER GOVERNMENT

## 2024-11-10 VITALS
TEMPERATURE: 97.5 F | OXYGEN SATURATION: 99 % | HEART RATE: 79 BPM | DIASTOLIC BLOOD PRESSURE: 72 MMHG | SYSTOLIC BLOOD PRESSURE: 170 MMHG | RESPIRATION RATE: 17 BRPM

## 2024-11-10 DIAGNOSIS — K21.9 GERD (GASTROESOPHAGEAL REFLUX DISEASE): ICD-10-CM

## 2024-11-10 DIAGNOSIS — R03.0 ELEVATED BLOOD PRESSURE READING: Primary | ICD-10-CM

## 2024-11-10 LAB
ALBUMIN SERPL BCG-MCNC: 4.3 G/DL (ref 3.5–5)
ALP SERPL-CCNC: 78 U/L (ref 34–104)
ALT SERPL W P-5'-P-CCNC: 25 U/L (ref 7–52)
ANION GAP SERPL CALCULATED.3IONS-SCNC: 8 MMOL/L (ref 4–13)
AST SERPL W P-5'-P-CCNC: 21 U/L (ref 13–39)
ATRIAL RATE: 71 BPM
BASOPHILS # BLD AUTO: 0.03 THOUSANDS/ÂΜL (ref 0–0.1)
BASOPHILS NFR BLD AUTO: 1 % (ref 0–1)
BILIRUB SERPL-MCNC: 0.66 MG/DL (ref 0.2–1)
BUN SERPL-MCNC: 9 MG/DL (ref 5–25)
CALCIUM SERPL-MCNC: 9.2 MG/DL (ref 8.4–10.2)
CHLORIDE SERPL-SCNC: 108 MMOL/L (ref 96–108)
CO2 SERPL-SCNC: 26 MMOL/L (ref 21–32)
CREAT SERPL-MCNC: 0.67 MG/DL (ref 0.6–1.3)
EOSINOPHIL # BLD AUTO: 0.08 THOUSAND/ÂΜL (ref 0–0.61)
EOSINOPHIL NFR BLD AUTO: 1 % (ref 0–6)
ERYTHROCYTE [DISTWIDTH] IN BLOOD BY AUTOMATED COUNT: 12.7 % (ref 11.6–15.1)
GFR SERPL CREATININE-BSD FRML MDRD: 98 ML/MIN/1.73SQ M
GLUCOSE SERPL-MCNC: 114 MG/DL (ref 65–140)
HCT VFR BLD AUTO: 43.3 % (ref 34.8–46.1)
HGB BLD-MCNC: 14.2 G/DL (ref 11.5–15.4)
IMM GRANULOCYTES # BLD AUTO: 0.02 THOUSAND/UL (ref 0–0.2)
IMM GRANULOCYTES NFR BLD AUTO: 0 % (ref 0–2)
LIPASE SERPL-CCNC: 17 U/L (ref 11–82)
LYMPHOCYTES # BLD AUTO: 1.33 THOUSANDS/ÂΜL (ref 0.6–4.47)
LYMPHOCYTES NFR BLD AUTO: 21 % (ref 14–44)
MCH RBC QN AUTO: 29.6 PG (ref 26.8–34.3)
MCHC RBC AUTO-ENTMCNC: 32.8 G/DL (ref 31.4–37.4)
MCV RBC AUTO: 90 FL (ref 82–98)
MONOCYTES # BLD AUTO: 0.34 THOUSAND/ÂΜL (ref 0.17–1.22)
MONOCYTES NFR BLD AUTO: 5 % (ref 4–12)
NEUTROPHILS # BLD AUTO: 4.65 THOUSANDS/ÂΜL (ref 1.85–7.62)
NEUTS SEG NFR BLD AUTO: 72 % (ref 43–75)
NRBC BLD AUTO-RTO: 0 /100 WBCS
P AXIS: 38 DEGREES
PLATELET # BLD AUTO: 245 THOUSANDS/UL (ref 149–390)
PMV BLD AUTO: 10.6 FL (ref 8.9–12.7)
POTASSIUM SERPL-SCNC: 3.7 MMOL/L (ref 3.5–5.3)
PR INTERVAL: 150 MS
PROT SERPL-MCNC: 7.4 G/DL (ref 6.4–8.4)
QRS AXIS: -10 DEGREES
QRSD INTERVAL: 82 MS
QT INTERVAL: 378 MS
QTC INTERVAL: 411 MS
RBC # BLD AUTO: 4.8 MILLION/UL (ref 3.81–5.12)
SODIUM SERPL-SCNC: 142 MMOL/L (ref 135–147)
T WAVE AXIS: -2 DEGREES
VENTRICULAR RATE: 71 BPM
WBC # BLD AUTO: 6.45 THOUSAND/UL (ref 4.31–10.16)

## 2024-11-10 PROCEDURE — 93010 ELECTROCARDIOGRAM REPORT: CPT | Performed by: INTERNAL MEDICINE

## 2024-11-10 PROCEDURE — 36415 COLL VENOUS BLD VENIPUNCTURE: CPT

## 2024-11-10 PROCEDURE — 96374 THER/PROPH/DIAG INJ IV PUSH: CPT

## 2024-11-10 PROCEDURE — 99284 EMERGENCY DEPT VISIT MOD MDM: CPT | Performed by: EMERGENCY MEDICINE

## 2024-11-10 PROCEDURE — 96375 TX/PRO/DX INJ NEW DRUG ADDON: CPT

## 2024-11-10 PROCEDURE — 83690 ASSAY OF LIPASE: CPT

## 2024-11-10 PROCEDURE — 96361 HYDRATE IV INFUSION ADD-ON: CPT

## 2024-11-10 PROCEDURE — 80053 COMPREHEN METABOLIC PANEL: CPT

## 2024-11-10 PROCEDURE — 93005 ELECTROCARDIOGRAM TRACING: CPT

## 2024-11-10 PROCEDURE — 85025 COMPLETE CBC W/AUTO DIFF WBC: CPT

## 2024-11-10 PROCEDURE — 99284 EMERGENCY DEPT VISIT MOD MDM: CPT

## 2024-11-10 RX ORDER — SUCRALFATE ORAL 1 G/10ML
1 SUSPENSION ORAL
Qty: 1200 ML | Refills: 0 | Status: SHIPPED | OUTPATIENT
Start: 2024-11-10 | End: 2024-12-10

## 2024-11-10 RX ORDER — FAMOTIDINE 40 MG/1
40 TABLET, FILM COATED ORAL
Qty: 20 TABLET | Refills: 0 | Status: SHIPPED | OUTPATIENT
Start: 2024-11-10 | End: 2024-12-10

## 2024-11-10 RX ORDER — SUCRALFATE 1 G/1
1 TABLET ORAL ONCE
Status: COMPLETED | OUTPATIENT
Start: 2024-11-10 | End: 2024-11-10

## 2024-11-10 RX ORDER — FAMOTIDINE 10 MG/ML
20 INJECTION, SOLUTION INTRAVENOUS ONCE
Status: COMPLETED | OUTPATIENT
Start: 2024-11-10 | End: 2024-11-10

## 2024-11-10 RX ORDER — ONDANSETRON 2 MG/ML
4 INJECTION INTRAMUSCULAR; INTRAVENOUS ONCE
Status: COMPLETED | OUTPATIENT
Start: 2024-11-10 | End: 2024-11-10

## 2024-11-10 RX ADMIN — SODIUM CHLORIDE 1000 ML: 0.9 INJECTION, SOLUTION INTRAVENOUS at 13:32

## 2024-11-10 RX ADMIN — ONDANSETRON 4 MG: 2 INJECTION INTRAMUSCULAR; INTRAVENOUS at 13:35

## 2024-11-10 RX ADMIN — FAMOTIDINE 20 MG: 10 INJECTION, SOLUTION INTRAVENOUS at 13:35

## 2024-11-10 RX ADMIN — SUCRALFATE 1 G: 1 TABLET ORAL at 13:31

## 2024-11-10 NOTE — DISCHARGE INSTRUCTIONS
Your blood pressure was elevated in the emergency department today.  You should make an appointment with your doctor in the next 1 week for follow-up and recheck of the blood pressure.  I will be adding famotidine to your regimen as well as prescribing you the liquid form of sucralfate.  You should try to call your GI doctor and see if you can get your scope moved up, otherwise, you should get the scope as scheduled.  You should return to the emergency room if you have persistent nausea and vomiting and are unable to eat or drink, if you have severe chest pain that does not go away, or if you feel like you are going to pass out.

## 2024-11-10 NOTE — ED PROVIDER NOTES
Time reflects when diagnosis was documented in both MDM as applicable and the Disposition within this note       Time User Action Codes Description Comment    11/10/2024  2:39 PM Adin Damon Add [R03.0] Elevated blood pressure reading     11/10/2024  2:43 PM Markie Tran Add [K21.9] GERD (gastroesophageal reflux disease)           ED Disposition       ED Disposition   Discharge    Condition   Stable    Date/Time   Sun Nov 10, 2024  2:43 PM    Comment   Jessica Banda discharge to home/self care.                   Assessment & Plan       Medical Decision Making  54-year-old female with known GERD and known hiatal hernia with several days of severe burning epigastric pain and feeling of neck swelling and odynophagia. Patient with normal VS on presentation. Appears well and NAD. HEENT exam unremarkable with moist mucous membranes. Lungs CTA with good air movement. Heart sounds normal with RRR. Abdominal exam with epigastric tenderness but otherwise benign. Normal cap refill and equal pulses. No LE edema.  Concern for GERD, gastritis, esophagitis, pancreatitis, esophageal stricture.  I will get abdominal labs and treat symptomatically.    Amount and/or Complexity of Data Reviewed  Labs: ordered. Decision-making details documented in ED Course.    Risk  Prescription drug management.        ED Course as of 11/10/24 1843   Sun Nov 10, 2024   1442 LIPASE: 17   1442 CBC and differential  No leukocytosis, anemia, thrombocytopenia.   1442 CMP  Normal electrolytes and baseline kidney function and normal LFTs.   1442 Reassessed patient.  She is feeling a lot better after symptomatic treatment.  I discussed plan to change sucralfate to liquid as well as add famotidine to her regimen and also stressed to that she should call her GI doctor and attempt to get an earlier appointment on the scope. Patient voiced understanding of the plan and all questions were answered. Strict return precautions given. Patient is hemodynamically  "stable and safe for discharge at this time.       Medications   sucralfate (CARAFATE) tablet 1 g (1 g Oral Given 11/10/24 1331)   Famotidine (PF) (PEPCID) injection 20 mg (20 mg Intravenous Given 11/10/24 1335)   sodium chloride 0.9 % bolus 1,000 mL (0 mL Intravenous Stopped 11/10/24 1503)   ondansetron (ZOFRAN) injection 4 mg (4 mg Intravenous Given 11/10/24 1335)       ED Risk Strat Scores                           SBIRT 20yo+      Flowsheet Row Most Recent Value   Initial Alcohol Screen: US AUDIT-C     1. How often do you have a drink containing alcohol? 0 Filed at: 11/10/2024 1256   2. How many drinks containing alcohol do you have on a typical day you are drinking?  0 Filed at: 11/10/2024 1256   3b. FEMALE Any Age, or MALE 65+: How often do you have 4 or more drinks on one occassion? 0 Filed at: 11/10/2024 1256   Audit-C Score 0 Filed at: 11/10/2024 1256   AVNI: How many times in the past year have you...    Used an illegal drug or used a prescription medication for non-medical reasons? Never Filed at: 11/10/2024 1256                            History of Present Illness       Chief Complaint   Patient presents with    Heartburn     Pt is scheduled for an EGD in  2 weeks due to ongoing heart burn/esophagitis symptoms. However pt presenting to ED due to worsening symptoms as she feels her \"throat is swelling\" and ongoing decreased appetite. Pt taking pantoprazole 40mg BID and took maalox x1 with no improvement and unable to swallow prescribed carafate.        Past Medical History:   Diagnosis Date    Allergic     Allergic rhinitis     Anxiety     Arthritis     Depression     Eczema     Endometriosis     Fibromyalgia     GERD (gastroesophageal reflux disease)     Gestational diabetes 2000    Herpes 1996    Hyperlipidemia     Hypertension     Menopause ovarian failure     Migraine     Miscarriage     Nasal congestion     NSVT (nonsustained ventricular tachycardia) ([facility]) 01/27/2024    Obesity     Pulmonary embolism " (HCC) 2015    Was on hormone replacement therapy    s/p Medtr[employer] loop recorder 2024    Sleep apnea     on CPAP treatment    Sleep difficulties     Tachycardia     Vasospasm (HCC)       Past Surgical History:   Procedure Laterality Date    BREAST BIOPSY      CARDIAC CATHETERIZATION N/A 2023    Procedure: Cardiac catheterization;  Surgeon: Keyonna Prescott DO;  Location: [facility];  Service: Cardiology    CARDIAC CATHETERIZATION N/A 2023    Procedure: Cardiac Coronary Angiogram;  Surgeon: Keyonna Prescott DO;  Location: [facility];  Service: Cardiology    CARDIAC CATHETERIZATION Left 2023    Procedure: Cardiac Left Heart Cath;  Surgeon: Keyonna Prescott DO;  Location: [facility];  Service: Cardiology    CARDIAC ELECTROPHYSIOLOGY PROCEDURE N/A 2024    Procedure: Cardiac loop recorder implant;  Surgeon: Caren Dumont PA-C;  Location: [facility];  Service: Cardiology    CARPAL TUNNEL RELEASE       SECTION  1991    CHOLECYSTECTOMY  2013    HEMORRHOID SURGERY  1998    KNEE SURGERY      MENISCECTOMY      TUBAL LIGATION  2007    WISDOM TOOTH EXTRACTION        Family History   Problem Relation Age of Onset    Alzheimer's disease Father     Diabetes Father     Leukemia Mother     Cancer Mother         Leukemia      Social History     Tobacco Use    Smoking status: Never    Smokeless tobacco: Never   Vaping Use    Vaping status: Never Used   Substance Use Topics    Alcohol use: Not Currently    Drug use: Never      E-Cigarette/Vaping    E-Cigarette Use Never User       E-Cigarette/Vaping Substances    Nicotine No     THC No     CBD No     Flavoring No     Other No     Unknown No       I have reviewed and agree with the history as documented.     HPI  54-year-old female with history of GERD and hiatal hernia presents to the emergency room for several days of worsening severe burning epigastric pain, odynophagia, feeling  of throat swelling.  She was seen in the emergency room a few weeks ago for similar symptoms and had a CT chest abdomen and pelvis which was suggestive of esophagitis and showed a hiatal hernia but was otherwise unremarkable.  She was doing okay on a regimen of pantoprazole 40 mg twice daily and sucralfate but she has not been able to take the sucralfate the past 2 days because she feels like it gets stuck in her throat.  She does follow with GI for this and chronic constipation and is scheduled for an EGD in approximately 2 weeks.  She says for the past few days she has barely been able to eat or drink because it is painful.  Patient did have a normal bowel movement yesterday morning.  Denies fevers, chills, shortness of breath, cough, vomiting, diarrhea, dysuria, hematuria, urinary frequency or urgency, decreased urine output.  Review of Systems  See HPI      Objective       ED Triage Vitals [11/10/24 1239]   Temperature Pulse Blood Pressure Respirations SpO2 Patient Position - Orthostatic VS   97.5 °F (36.4 °C) 79 170/72 17 99 % --      Temp Source Heart Rate Source BP Location FiO2 (%) Pain Score    Temporal Monitor Right arm -- 10 - Worst Possible Pain      Vitals      Date and Time Temp Pulse SpO2 Resp BP Pain Score FACES Pain Rating User   11/10/24 1239 97.5 °F (36.4 °C) 79 99 % 17 170/72 10 - Worst Possible Pain -- MO            Physical Exam  Constitutional:       General: She is not in acute distress.     Appearance: Normal appearance. She is not ill-appearing.   HENT:      Head: Normocephalic and atraumatic.      Mouth/Throat:      Mouth: Mucous membranes are moist.      Pharynx: Oropharynx is clear.      Comments: No pharyngeal swelling  Eyes:      Extraocular Movements: Extraocular movements intact.      Conjunctiva/sclera: Conjunctivae normal.   Neck:      Comments: No neck swelling  Cardiovascular:      Rate and Rhythm: Normal rate and regular rhythm.      Pulses: Normal pulses.      Heart sounds:  Normal heart sounds.   Pulmonary:      Effort: Pulmonary effort is normal.      Breath sounds: Normal breath sounds.   Abdominal:      General: There is no distension.      Palpations: Abdomen is soft.      Tenderness: There is abdominal tenderness (Epigastric). There is no guarding or rebound.   Musculoskeletal:      Cervical back: Normal range of motion and neck supple.      Right lower leg: No edema.      Left lower leg: No edema.   Skin:     General: Skin is warm and dry.      Capillary Refill: Capillary refill takes less than 2 seconds.   Neurological:      General: No focal deficit present.      Mental Status: She is alert and oriented to person, place, and time.   Psychiatric:         Mood and Affect: Mood normal.         Behavior: Behavior normal.         Thought Content: Thought content normal.         Results Reviewed       Procedure Component Value Units Date/Time    [facility] [[id number]] Collected: 11/10/24 1330    Lab Status: Final result Specimen: Blood from Arm, Left Updated: 11/10/24 1359     Sodium 142 mmol/L      Potassium 3.7 mmol/L      Chloride 108 mmol/L      CO2 26 mmol/L      ANION GAP 8 mmol/L      BUN 9 mg/dL      Creatinine 0.67 mg/dL      Glucose 114 mg/dL      Calcium 9.2 mg/dL      AST 21 U/L      ALT 25 U/L      Alkaline Phosphatase 78 U/L      Total Protein 7.4 g/dL      Albumin 4.3 g/dL      Total Bilirubin 0.66 mg/dL      eGFR 98 ml/min/1.73sq m     Narrative:      National [employer] guidelines for Chronic Kidney Disease (CKD):     Stage 1 with normal or high GFR (GFR > 90 mL/min/1.73 square meters)    Stage 2 Mild CKD (GFR = 60-89 mL/min/1.73 square meters)    Stage 3A Moderate CKD (GFR = 45-59 mL/min/1.73 square meters)    Stage 3B Moderate CKD (GFR = 30-44 mL/min/1.73 square meters)    Stage 4 Severe CKD (GFR = 15-29 mL/min/1.73 square meters)    Stage 5 End Stage CKD (GFR <15 mL/min/1.73 square meters)  Note: GFR calculation is accurate only with a steady state  creatinine    Lipase [[id number]]  (Normal) Collected: 11/10/24 1330    Lab Status: Final result Specimen: Blood from Arm, Left Updated: 11/10/24 1359     Lipase 17 u/L     CBC and differential [[id number]] Collected: 11/10/24 1330    Lab Status: Final result Specimen: Blood from Arm, Left Updated: 11/10/24 1338     WBC 6.45 Thousand/uL      RBC 4.80 Million/uL      Hemoglobin 14.2 g/dL      Hematocrit 43.3 %      MCV 90 fL      MCH 29.6 pg      MCHC 32.8 g/dL      RDW 12.7 %      MPV 10.6 fL      Platelets 245 Thousands/uL      nRBC 0 /100 WBCs      Segmented % 72 %      Immature Grans % 0 %      Lymphocytes % 21 %      Monocytes % 5 %      Eosinophils Relative 1 %      Basophils Relative 1 %      Absolute Neutrophils 4.65 Thousands/µL      Absolute Immature Grans 0.02 Thousand/uL      Absolute Lymphocytes 1.33 Thousands/µL      Absolute Monocytes 0.34 Thousand/µL      Eosinophils Absolute 0.08 Thousand/µL      Basophils Absolute 0.03 Thousands/µL             No orders to display       Procedures    ED Medication and Procedure Management   Prior to Admission Medications   Prescriptions Last Dose Informant Patient Reported? Taking?   B Complex Vitamins (VITAMIN B COMPLEX) TABS  Self Yes No   Sig: Take by mouth   Patient not taking: Reported on 10/23/2024   Cholecalciferol (VITAMIN D) 2000 units CAPS  Self Yes No   Sig: Take by mouth   Patient not taking: Reported on 10/23/2024   Coenzyme Q10 10 MG capsule  Self Yes No   Sig: Take 10 mg by mouth daily   Patient not taking: Reported on 10/8/2024   Magnesium Gluconate 250 MG TABS   Yes No   Sig: Take by mouth daily   Progesterone 100 MG CAPS   No No   Sig: Take 100 mg by mouth at bedtime   Patient not taking: Reported on 10/23/2024   ammonium lactate (LAC-HYDRIN) 12 % cream   No No   Sig: APPLY TOPICALLY AS NEEDED FOR DRY SKIN   azelastine (ASTELIN) 0.1 % nasal spray  Self No No   Si spray into each nostril 2 (two) times a day Use in each nostril as directed    diclofenac sodium (VOLTAREN) 50 mg EC tablet   No No   Sig: Take 1 tablet (50 mg total) by mouth 3 (three) times a day   diltiazem (CARDIZEM) 30 mg tablet   No No   Sig: TAKE 1 TABLET BY MOUTH TWICE A DAY   fluticasone (FLONASE) 50 mcg/act nasal spray  Self No No   Si spray into each nostril 2 (two) times a day   lactulose (CHRONULAC) 10 g/15 mL solution   No No   Sig: Take 30 mL (20 g total) by mouth 2 (two) times a day as needed (Constipation)   nitrofurantoin (MACROBID) 100 mg capsule   No No   Sig: Take 1 capsule (100 mg total) by mouth 2 (two) times a day   Patient not taking: Reported on 10/8/2024   ondansetron (ZOFRAN-ODT) 4 mg disintegrating tablet   No No   Sig: Take 1 tablet (4 mg total) by mouth every 8 (eight) hours as needed for nausea or vomiting   other medication, see sig,   No No   Sig: Medication/product name: testosterone 4 mg./ml cream   Strength: as above  Sig (include dose, route, frequency): apply 0.5 ml to inner thigh daily   pantoprazole (PROTONIX) 40 mg tablet   Yes No   Sig: Take 40 mg by mouth daily   rosuvastatin (CRESTOR) 5 mg tablet   No No   Sig: TAKE 1 TABLET DAILY   sucralfate (CARAFATE) 1 g tablet   No No   Sig: Take 1 tablet (1 g total) by mouth 3 (three) times a day as needed (reflux)   tiZANidine (ZANAFLEX) 2 mg tablet   No No   Sig: Take 1 tablet (2 mg total) by mouth every 8 (eight) hours as needed for muscle spasms   Patient not taking: Reported on 10/8/2024      Facility-Administered Medications: None     Discharge Medication List as of 11/10/2024  2:54 PM        START taking these medications    Details   famotidine (PEPCID) 40 MG tablet Take 1 tablet (40 mg total) by mouth daily at bedtime as needed for heartburn, Starting Sun 11/10/2024, Until Tue 12/10/2024 at 2359, Normal      sucralfate (CARAFATE) 1 g/10 mL suspension Take 10 mL (1 g total) by mouth 4 (four) times a day (with meals and at bedtime), Starting Sun 11/10/2024, Until Tue 12/10/2024, Normal            CONTINUE these medications which have NOT CHANGED    Details   ammonium lactate (LAC-HYDRIN) 12 % cream APPLY TOPICALLY AS NEEDED FOR DRY SKIN, Starting Thu 9/12/2024, Normal      azelastine (ASTELIN) 0.1 % nasal spray 1 spray into each nostril 2 (two) times a day Use in each nostril as directed, Starting Mon 1/22/2024, Until Thu 10/31/2024, Normal      B Complex Vitamins (VITAMIN B COMPLEX) TABS Take by mouth, Historical Med      Cholecalciferol (VITAMIN D) 2000 units CAPS Take by mouth, Historical Med      Coenzyme Q10 10 MG capsule Take 10 mg by mouth daily, Starting Mon 10/3/2022, Historical Med      diclofenac sodium (VOLTAREN) 50 mg EC tablet Take 1 tablet (50 mg total) by mouth 3 (three) times a day, Starting Wed 9/25/2024, Normal      diltiazem (CARDIZEM) 30 mg tablet TAKE 1 TABLET BY MOUTH TWICE A DAY, Starting Fri 8/2/2024, Normal      fluticasone (FLONASE) 50 mcg/act nasal spray 1 spray into each nostril 2 (two) times a day, Starting Wed 10/9/2019, Normal      lactulose (CHRONULAC) 10 g/15 mL solution Take 30 mL (20 g total) by mouth 2 (two) times a day as needed (Constipation), Starting Wed 10/23/2024, Normal      Magnesium Gluconate 250 MG TABS Take by mouth daily, Historical Med      nitrofurantoin (MACROBID) 100 mg capsule Take 1 capsule (100 mg total) by mouth 2 (two) times a day, Starting Wed 9/25/2024, Normal      ondansetron (ZOFRAN-ODT) 4 mg disintegrating tablet Take 1 tablet (4 mg total) by mouth every 8 (eight) hours as needed for nausea or vomiting, Starting Thu 8/29/2024, Normal      other medication, see sig, Medication/product name: testosterone 4 mg./ml cream   Strength: as above  Sig (include dose, route, frequency): apply 0.5 ml to inner thigh daily, Phone In      pantoprazole (PROTONIX) 40 mg tablet Take 40 mg by mouth daily, Historical Med      Progesterone 100 MG CAPS Take 100 mg by mouth at bedtime, Starting Mon 9/30/2024, Normal      rosuvastatin (CRESTOR) 5 mg tablet TAKE 1  TABLET DAILY, Starting Thu 9/12/2024, Normal      tiZANidine (ZANAFLEX) 2 mg tablet Take 1 tablet (2 mg total) by mouth every 8 (eight) hours as needed for muscle spasms, Starting Tue 9/3/2024, Normal           STOP taking these medications       sucralfate (CARAFATE) 1 g tablet Comments:   Reason for Stopping:             No discharge procedures on file.  ED SEPSIS DOCUMENTATION   Time reflects when diagnosis was documented in both MDM as applicable and the Disposition within this note       Time User Action Codes Description Comment    11/10/2024  2:39 PM Adin Damon Add [R03.0] Elevated blood pressure reading     11/10/2024  2:43 PM Markie Tarn [K21.9] GERD (gastroesophageal reflux disease)                  Markie Tran DO  11/10/24 184

## 2024-11-10 NOTE — ED ATTENDING ATTESTATION
11/10/2024  I, Adin Damon DO, saw and evaluated the patient. I have discussed the patient with the resident/non-physician practitioner and agree with the resident's/non-physician practitioner's findings, Plan of Care, and MDM as documented in the resident's/non-physician practitioner's note, except where noted. All available labs and Radiology studies were reviewed.  I was present for key portions of any procedure(s) performed by the resident/non-physician practitioner and I was immediately available to provide assistance.       At this point I agree with the current assessment done in the Emergency Department.  I have conducted an independent evaluation of this patient a history and physical is as follows:    Patient is a 54-year-old female with a history of GERD, previous cholecystectomy anxiety, hyperlipidemia, fibromyalgia, who follows with [facility].  She had been maintained on pantoprazole 40 mg daily for about a year for her GERD symptoms which was very helpful and successful, she then thought that she could go down in dosage and so in August 2024 she decreased to 20 mg a day.  She says about a week or 2 afterwards she began having some increasing feeling of abdominal burning and discomfort whenever she ate, some reflux-like symptoms.  She was seen in the emergency department on October 24, 2024, had a CT chest abdomen pelvis which showed some mild esophageal thickening but no other significant acute pathology.  She then followed up with her gastroenterology office, pantoprazole was increased to 40 mg twice a day and she was scheduled for an endoscopy in about 2 weeks.  She presents today because over the last several days she has had worsening odynophagia, epigastric discomfort after eating, she has been limiting her diet now where she really can only eat plain salads, and soups.  She says she is been drinking eating less.  She has been prescribed sulcal fate but  says she is unable to take the tablets because it hurts too much when she tries to swallow.  She has had no vomiting, does suffer from chronic constipation but is on Linzess last bowel was yesterday and she says the Linzess has been helpful.  She has no change in her symptoms over the last several weeks they have gotten little bit more severe.  She says she feels gets a burning sensation in her chest when she tries to swallow, that sensation is not pleuritic in nature, not knifelike, ripping, or tearing and has not gone away.  Her  indicates that he is well and he is unaware of any sick contacts either.Patient denies any prolonged travel history, no recent long travel, no immobilizations, or hospitalizations.      General:  Patient is well-appearing  Head:  Atraumatic  Eyes:  Conjunctiva pink  ENT:  Mucous membranes moist. No swelling of the posterior pharynx. No tonsillar enlargement, exudate, lesions. No swelling in the floor of the mouth. No uvula deviation. No trismus.  Neck:  Supple  Cardiac:  S1-S2, without murmurs  Lungs:  Clear to auscultation bilaterally  Abdomen:  Soft, slight epigastric tenderness, normal bowel sounds, no CVA tenderness, no tympany, no rigidity, no guarding  Extremities:  Normal range of motion, no pedal edema or calf asymmetry, radial pulses are equal and symmetric bilaterally  Neurologic:  Awake, fluent speech, normal comprehension, AAOx3  Skin:  Pink warm and dry  Psychiatric:  Alert, pleasant, cooperative      ED Course  ED Course as of 11/10/24 1326   Sun Nov 10, 2024   1325 ECG performed at 1243 hrs., interpreted by me, sinus rhythm rate of 71, no acute ischemic or infarctive changes, there is no acute change from January 14, 2024     Labs Reviewed   LIPASE - Normal       Result Value Ref Range Status    Lipase 17  11 - 82 u/L Final   CBC AND DIFFERENTIAL    WBC 6.45  4.31 - 10.16 Thousand/uL Final    RBC 4.80  3.81 - 5.12 Million/uL Final    Hemoglobin 14.2  11.5 - 15.4 g/dL  Final    Hematocrit 43.3  34.8 - 46.1 % Final    MCV 90  82 - 98 fL Final    MCH 29.6  26.8 - 34.3 pg Final    MCHC 32.8  31.4 - 37.4 g/dL Final    RDW 12.7  11.6 - 15.1 % Final    MPV 10.6  8.9 - 12.7 fL Final    Platelets 245  149 - 390 Thousands/uL Final    nRBC 0  /100 WBCs Final    Segmented % 72  43 - 75 % Final    Immature Grans % 0  0 - 2 % Final    Lymphocytes % 21  14 - 44 % Final    Monocytes % 5  4 - 12 % Final    Eosinophils Relative 1  0 - 6 % Final    Basophils Relative 1  0 - 1 % Final    Absolute Neutrophils 4.65  1.85 - 7.62 Thousands/µL Final    Absolute Immature Grans 0.02  0.00 - 0.20 Thousand/uL Final    Absolute Lymphocytes 1.33  0.60 - 4.47 Thousands/µL Final    Absolute Monocytes 0.34  0.17 - 1.22 Thousand/µL Final    Eosinophils Absolute 0.08  0.00 - 0.61 Thousand/µL Final    Basophils Absolute 0.03  0.00 - 0.10 Thousands/µL Final   COMPREHENSIVE METABOLIC PANEL    Sodium 142  135 - 147 mmol/L Final    Potassium 3.7  3.5 - 5.3 mmol/L Final    Chloride 108  96 - 108 mmol/L Final    CO2 26  21 - 32 mmol/L Final    ANION GAP 8  4 - 13 mmol/L Final    BUN 9  5 - 25 mg/dL Final    Creatinine 0.67  0.60 - 1.30 mg/dL Final    Comment: Standardized to IDMS reference method    Glucose 114  65 - 140 mg/dL Final    Comment: If the patient is fasting, the ADA then defines impaired fasting glucose as > 100 mg/dL and diabetes as > or equal to 123 mg/dL.    Calcium 9.2  8.4 - 10.2 mg/dL Final    AST 21  13 - 39 U/L Final    ALT 25  7 - 52 U/L Final    Comment: Specimen collection should occur prior to Sulfasalazine administration due to the potential for falsely depressed results.     Alkaline Phosphatase 78  34 - 104 U/L Final    Total Protein 7.4  6.4 - 8.4 g/dL Final    Albumin 4.3  3.5 - 5.0 g/dL Final    Total Bilirubin 0.66  0.20 - 1.00 mg/dL Final    Comment: Use of this assay is not recommended for patients undergoing treatment with eltrombopag due to the potential for falsely elevated  results.  N-acetyl-p-benzoquinone imine (metabolite of Acetaminophen) will generate erroneously low results in samples for patients that have taken an overdose of Acetaminophen.    eGFR 98  ml/min/1.73sq m Final    Narrative:     National Kidney Disease Foundation guidelines for Chronic Kidney Disease (CKD):     Stage 1 with normal or high GFR (GFR > 90 mL/min/1.73 square meters)    Stage 2 Mild CKD (GFR = 60-89 mL/min/1.73 square meters)    Stage 3A Moderate CKD (GFR = 45-59 mL/min/1.73 square meters)    Stage 3B Moderate CKD (GFR = 30-44 mL/min/1.73 square meters)    Stage 4 Severe CKD (GFR = 15-29 mL/min/1.73 square meters)    Stage 5 End Stage CKD (GFR <15 mL/min/1.73 square meters)  Note: GFR calculation is accurate only with a steady state creatinine     After symptomatic management with liquid Carafate, Pepcid, patient was feeling better.  She was able to tolerate p.o.  At this point expect the patient has esophagitis.  Laboratory studies showed no evidence of pancreatitis or severe dehydration or severe anemia.  I do not believe repeating her imaging is indicated today.  I do not believe her discomfort represents acute coronary syndrome or PE.  I believe the patient is stable for discharge with Carafate and Pepcid and follow-up with her GI physicians.  I did also recommend the patient could use over-the-counter protein shakes such as Ensure shakes to assist with her nutrition.  She and  are comfortable with this plan.      DIAGNOSIS:  Acute exacerbation of chronic esophagitis and GERD, history of cholecystectomy    MEDICAL DECISION MAKING CODING    COLLECTION AND INTERPRETATION OF DATA  I reviewed prior external notes, including imaging as noted above, November 4, 2020 4 GI office visit    I ordered each unique test  Tests reviewed personally by me:  ECG: See my ED course  Labs: See above      Critical Care Time  Procedures

## 2024-11-15 ENCOUNTER — REMOTE DEVICE CLINIC VISIT (OUTPATIENT)
Dept: CARDIOLOGY CLINIC | Facility: CLINIC | Age: 54
End: 2024-11-15
Payer: OTHER GOVERNMENT

## 2024-11-15 ENCOUNTER — RESULTS FOLLOW-UP (OUTPATIENT)
Dept: CARDIOLOGY CLINIC | Facility: CLINIC | Age: 54
End: 2024-11-15

## 2024-11-15 DIAGNOSIS — R00.2 PALPITATIONS: Primary | ICD-10-CM

## 2024-11-15 PROCEDURE — 93298 REM INTERROG DEV EVAL SCRMS: CPT | Performed by: STUDENT IN AN ORGANIZED HEALTH CARE EDUCATION/TRAINING PROGRAM

## 2024-11-18 ENCOUNTER — OFFICE VISIT (OUTPATIENT)
Dept: PAIN MEDICINE | Facility: CLINIC | Age: 54
End: 2024-11-18
Payer: OTHER GOVERNMENT

## 2024-11-18 VITALS — HEIGHT: 63 IN | WEIGHT: 204 LBS | BODY MASS INDEX: 36.14 KG/M2

## 2024-11-18 DIAGNOSIS — M54.14 THORACIC RADICULITIS: Primary | ICD-10-CM

## 2024-11-18 DIAGNOSIS — M47.814 THORACIC SPONDYLOSIS: ICD-10-CM

## 2024-11-18 PROCEDURE — 99214 OFFICE O/P EST MOD 30 MIN: CPT | Performed by: ANESTHESIOLOGY

## 2024-11-18 NOTE — PROGRESS NOTES
Assessment:  1. Thoracic radiculitis    2. Thoracic spondylosis      Patient presenting for follow up visit. She has a history of chronic mid back pain for greater than 4 years, worsening over the past several months. Pain is consistent with thoracic radiculitis, myofascial pain accompanied by pain at times 7/10 on the pain scale with inability to participate in IADLs for >6 weeks. Patient has participated with physical therapy as well as home exercises and stretches.  Patient has tried Tylenol, NSAIDs, tizanidine, oral steroids with modest benefit. Denies any bowel or bladder incontinence, saddle anesthesia.     In regards to the patient's  pathology, we discussed the various treatment options including physical therapy, chiropractic treatment, medication management, activity modifications, interventional spine procedures.  Given that patient has not had any benefit with conservative treatments, I think patient would benefit from targeted interventional treatment modalities.    Independently reviewed and interpreted recent thoracic MRI - this showed multilevel degenerative changes with a small right central disc protrusion at T8-9.     Plan:     Given thoracic radicular pain pattern and corresponding MRI findings with symptoms not responding to PT/medications, I discussed and offered a right T8-9 thoracic interlaminar epidural steroid injection for thoracic radiculitis.    At this time the patient defers scheduling for thoracic epidural steroid injection.  She would like to first restart chiropractic therapy and see if alternate physical treatments alleviate her symptoms prior to considering SEBASTIÁN.    She was instructed to contact our office to schedule for thoracic SEBASTIÁN if her symptoms do not improve with chiropractic therapy and she decides to proceed with interventions.     Reviewed external notes from physical therapy, family medicine offices for review and interpretation of recent and prior relevant medical  histories, treatment recommendations, medication and/or.  She interventional treatment responses.     Reviewed hemoglobin A1c, renal function, CBC and/or PT/INR prior to discussing/offering interventional modalities.     My impressions and treatment recommendations were discussed in detail with the patient who verbalized understanding and had no further questions.  Discharge instructions were provided. I personally saw and examined the patient and I agree with the above discussed plan of care.    History of Present Illness:  Jessica Banda is a 54 y.o. female who presents for a follow up office visit in regards to back pain. The patient’s current symptoms include continued mid back pain radiating to the right chest wall. Pain is currently rated at 4/10 and described as an intermittent burning, sharp, throbbing pain worse in the morning.    I have personally reviewed and/or updated the patient's past medical history, past surgical history, family history, social history, current medications, allergies, and vital signs today.     Review of Systems   Constitutional:  Negative for chills and fever.   HENT:  Negative for ear pain and sore throat.    Eyes:  Negative for pain and visual disturbance.   Respiratory:  Negative for cough and shortness of breath.    Cardiovascular:  Negative for chest pain and palpitations.   Gastrointestinal:  Negative for abdominal pain and vomiting.   Genitourinary:  Negative for dysuria and hematuria.   Musculoskeletal:  Positive for back pain, gait problem and myalgias. Negative for arthralgias.   Skin:  Negative for color change and rash.   Neurological:  Positive for weakness. Negative for seizures and syncope.   All other systems reviewed and are negative.      Patient Active Problem List   Diagnosis    Acute pancreatitis    Anxiety    Neck pain    Eczema    Fibromyalgia    Hyperlipidemia    Impaired fasting glucose    Sleep apnea    Vitamin D deficiency    Xerosis of skin    GERD  (gastroesophageal reflux disease)    Severe obesity (BMI 35.0-39.9) with comorbidity (HCC)    Chronic pain of both knees    Elevated blood sugar    Hepatic steatosis    Suprapubic pain    Left lower quadrant pain    Non-seasonal allergic rhinitis due to pollen    Pulmonary nodules    Allergic rhinitis due to dust    Chronic seasonal allergic rhinitis due to pollen    Allergic rhinitis due to animal (cat) (dog) hair and dander    Venous insufficiency    Peripheral tear of medial meniscus of right knee as current injury    Benign paroxysmal positional vertigo due to bilateral vestibular disorder    Carpal tunnel syndrome    Low back pain    Non-cardiac chest pain    Panic disorder    Personal history of disorder of nervous system and sense organs    Pulmonary embolism with infarction (HCC)    Symptomatic menopausal or female climacteric states    Rotator cuff syndrome of right shoulder    Scapulothoracic syndrome    Vasospastic angina (HCC)    Prediabetes    Pelvic pain    Urge incontinence    Palpitations    NSVT (nonsustained ventricular tachycardia) (HCC)    s/p Medtronic loop recorder 1/27/2024    Uterine fibroid    Post-COVID chronic cough       Past Medical History:   Diagnosis Date    Allergic     Allergic rhinitis     Anxiety     Arthritis     Depression     Eczema     Endometriosis     Fibromyalgia     GERD (gastroesophageal reflux disease)     Gestational diabetes 2000    Herpes 1996    Hyperlipidemia     Hypertension     Menopause ovarian failure     Migraine     Miscarriage     Nasal congestion     NSVT (nonsustained ventricular tachycardia) (HCC) 01/27/2024    Obesity     Pulmonary embolism (HCC) 2015    Was on hormone replacement therapy    s/p Medtronic loop recorder 1/27/2024 01/27/2024    Sleep apnea     on CPAP treatment    Sleep difficulties     Tachycardia     Vasospasm (HCC)        Past Surgical History:   Procedure Laterality Date    BREAST BIOPSY  2008    CARDIAC CATHETERIZATION N/A 11/27/2023     Procedure: Cardiac catheterization;  Surgeon: Keyonna Prescott DO;  Location: [facility];  Service: Cardiology    CARDIAC CATHETERIZATION N/A 2023    Procedure: Cardiac Coronary Angiogram;  Surgeon: Keyonna Precsott DO;  Location: [facility];  Service: Cardiology    CARDIAC CATHETERIZATION Left 2023    Procedure: Cardiac Left Heart Cath;  Surgeon: Keyonna Prescott DO;  Location: [facility];  Service: Cardiology    CARDIAC ELECTROPHYSIOLOGY PROCEDURE N/A 2024    Procedure: Cardiac loop recorder implant;  Surgeon: Caren Dumont PA-C;  Location: [facility];  Service: Cardiology    CARPAL TUNNEL RELEASE       SECTION  1991    CHOLECYSTECTOMY  2013    HEMORRHOID SURGERY  1998    KNEE SURGERY      MENISCECTOMY      TUBAL LIGATION  2007    WISDOM TOOTH EXTRACTION         Family History   Problem Relation Age of Onset    Alzheimer's disease Father     Diabetes Father     Leukemia Mother     Cancer Mother         Leukemia       Social History     Occupational History    Not on file   Tobacco Use    Smoking status: Never    Smokeless tobacco: Never   Vaping Use    Vaping status: Never Used   Substance and Sexual Activity    Alcohol use: Not Currently    Drug use: Never    Sexual activity: Not Currently     Partners: Male     Birth control/protection: Abstinence     Comment:  is not interested       Current Outpatient Medications on File Prior to Visit   Medication Sig    ammonium lactate (LAC-HYDRIN) 12 % cream APPLY TOPICALLY AS NEEDED FOR DRY SKIN    azelastine (ASTELIN) 0.1 % nasal spray 1 spray into each nostril 2 (two) times a day Use in each nostril as directed    B Complex Vitamins (VITAMIN B COMPLEX) TABS Take by mouth (Patient not taking: Reported on 10/23/2024)    Cholecalciferol (VITAMIN D) 2000 units CAPS Take by mouth (Patient not taking: Reported on 10/23/2024)    Coenzyme Q10 10 MG capsule Take 10 mg by mouth daily  (Patient not taking: Reported on 10/8/2024)    diclofenac sodium (VOLTAREN) 50 mg EC tablet Take 1 tablet (50 mg total) by mouth 3 (three) times a day    diltiazem (CARDIZEM) 30 mg tablet TAKE 1 TABLET BY MOUTH TWICE A DAY    famotidine (PEPCID) 40 MG tablet Take 1 tablet (40 mg total) by mouth daily at bedtime as needed for heartburn    fluticasone (FLONASE) 50 mcg/act nasal spray 1 spray into each nostril 2 (two) times a day    lactulose (CHRONULAC) 10 g/15 mL solution Take 30 mL (20 g total) by mouth 2 (two) times a day as needed (Constipation)    Magnesium Gluconate 250 MG TABS Take by mouth daily    nitrofurantoin (MACROBID) 100 mg capsule Take 1 capsule (100 mg total) by mouth 2 (two) times a day (Patient not taking: Reported on 10/8/2024)    ondansetron (ZOFRAN-ODT) 4 mg disintegrating tablet Take 1 tablet (4 mg total) by mouth every 8 (eight) hours as needed for nausea or vomiting    other medication, see sig, Medication/product name: testosterone 4 mg./ml cream   Strength: as above  Sig (include dose, route, frequency): apply 0.5 ml to inner thigh daily    pantoprazole (PROTONIX) 40 mg tablet Take 40 mg by mouth daily    Progesterone 100 MG CAPS Take 100 mg by mouth at bedtime (Patient not taking: Reported on 10/23/2024)    rosuvastatin (CRESTOR) 5 mg tablet TAKE 1 TABLET DAILY    sucralfate (CARAFATE) 1 g/10 mL suspension Take 10 mL (1 g total) by mouth 4 (four) times a day (with meals and at bedtime)    tiZANidine (ZANAFLEX) 2 mg tablet Take 1 tablet (2 mg total) by mouth every 8 (eight) hours as needed for muscle spasms (Patient not taking: Reported on 10/8/2024)     No current facility-administered medications on file prior to visit.       Allergies   Allergen Reactions    Acetazolamide Hives    Other Hives    Amoxicillin-Pot Clavulanate Hives    Iodinated Contrast Media      Needs to be prepped for IVC    Iothalamate Hives     Contrast dye    Oxycodone GI Intolerance and Vomiting    Sulfa Antibiotics  "Hives       Physical Exam:    Ht 5' 3\" (1.6 m)   Wt 92.5 kg (204 lb)   LMP 01/12/2018 (Exact Date) Comment: post menopausal x 5 yrs.  BMI 36.14 kg/m²     Constitutional:normal, well developed, well nourished, alert, in no distress and non-toxic and no overt pain behavior.  Eyes:anicteric  HEENT:grossly intact  Neck:supple, symmetric, trachea midline and no masses   Pulmonary:even and unlabored  Cardiovascular:No edema or pitting edema present  Skin:Normal without rashes or lesions and well hydrated  Psychiatric:Mood and affect appropriate  Neurologic: Motor function is grossly intact with no focal neurologic deficits   Musculoskeletal: TTP right thoracic paraspinal region    Imaging  MRI thoracic spine  FINDINGS:     VERTEBRAL SEGMENT AND DISC SPACES:  C7-T1 through T7-T8: No disc herniation or significant narrowing of the spinal canal or neural foramina evident. Mild to moderate disc degeneration. Old anterior wedging deformities T7 and T8 loss of less than 25% anterior vertebral body height.     T8-T9: Evidence of mild narrowing of the spinal canal due to small right central disc protrusion. Mild to moderate disc degeneration with Modic type II signal alteration.     T9-T10: No disc herniation or significant narrowing of the spinal canal or neural foramina evident. Mild to moderate disc degeneration and mild arthropathy.     T10-T11 and T11-T12: Evidence of mild narrowing of the spinal canal due to hypertrophic degenerative change of the facet joints and ligamenta flava. Mild disc degeneration.     T12-L1, L1-L2 and L2-L3: No disc herniation or significant narrowing of the spinal canal or neural foramina evident. Mild to moderate disc degeneration. Old Schmorl's node deformity inferior endplate T12.     SPINAL CORD: No spinal cord abnormality evident.     EXTRASPINAL STRUCTURES: No significant abnormality evident.     MARROW SIGNAL: Minimal discogenic Modic type II signal alteration at T8-T9 no other " significant abnormality evident.     IMPRESSION:  Thoracic spondylosis associated with no more than mild spinal canal narrowing and with no neural encroach

## 2025-01-08 DIAGNOSIS — I47.29 NSVT (NONSUSTAINED VENTRICULAR TACHYCARDIA) (HCC): ICD-10-CM

## 2025-01-08 NOTE — TELEPHONE ENCOUNTER
Pharmacy change  Patient is getting notifications that her medication is ready for  at [facility]. She wants her scripts sent to [facility].    Reason for call:   [x] Refill   [] Prior Auth  [] Other:     Office:   [] PCP/Provider -   [x] Specialty/Provider - Cardio    Medication:  diltiazem (CARDIZEM) 30 mg tablet    Dose/Frequency: TAKE 1 TABLET BY MOUTH TWICE A DAY     Quantity: 180    Pharmacy: [facility] - [place], [place] - [address] [place] [address]    Does the patient have enough for 3 days?   [x] Yes   [] No - Send as HP to POD

## 2025-01-09 RX ORDER — DILTIAZEM HYDROCHLORIDE 30 MG/1
30 TABLET, FILM COATED ORAL 2 TIMES DAILY
Qty: 180 TABLET | Refills: 1 | Status: SHIPPED | OUTPATIENT
Start: 2025-01-09

## 2025-01-10 ENCOUNTER — ANNUAL EXAM (OUTPATIENT)
Dept: GYNECOLOGY | Facility: CLINIC | Age: 55
End: 2025-01-10
Payer: OTHER GOVERNMENT

## 2025-01-10 VITALS
SYSTOLIC BLOOD PRESSURE: 116 MMHG | DIASTOLIC BLOOD PRESSURE: 72 MMHG | WEIGHT: 211.4 LBS | BODY MASS INDEX: 37.46 KG/M2 | HEIGHT: 63 IN

## 2025-01-10 DIAGNOSIS — Z01.419 WOMEN'S ANNUAL ROUTINE GYNECOLOGICAL EXAMINATION: Primary | ICD-10-CM

## 2025-01-10 DIAGNOSIS — Z12.31 ENCOUNTER FOR SCREENING MAMMOGRAM FOR BREAST CANCER: ICD-10-CM

## 2025-01-10 DIAGNOSIS — N89.8 VAGINAL DRYNESS: ICD-10-CM

## 2025-01-10 DIAGNOSIS — D25.9 UTERINE LEIOMYOMA, UNSPECIFIED LOCATION: ICD-10-CM

## 2025-01-10 DIAGNOSIS — N95.1 SYMPTOMATIC MENOPAUSAL OR FEMALE CLIMACTERIC STATES: ICD-10-CM

## 2025-01-10 PROCEDURE — 99396 PREV VISIT EST AGE 40-64: CPT | Performed by: OBSTETRICS & GYNECOLOGY

## 2025-01-10 RX ORDER — LINACLOTIDE 145 UG/1
CAPSULE, GELATIN COATED ORAL
COMMUNITY
Start: 2024-12-03

## 2025-01-10 NOTE — PROGRESS NOTES
Assessment & Plan   Diagnoses and all orders for this visit:    Women's annual routine gynecological examination    Encounter for screening mammogram for breast cancer  -     Mammo screening bilateral w 3d and cad; Future    Uterine leiomyoma, unspecified location    Vaginal dryness    Symptomatic menopausal or female climacteric states    Other orders  -     Linzess 145 MCG CAPS    1. yearly exam-Pap smear deferred, self breast awareness reviewed, calcium/vitamin D recommendations discussed, mammogram request given, colonoscopy up-to-date follow-up as per specialist.  2. prior pelvic discomfort-denies any complaints at this time.  To call or return with any issues.  3.  Mild vaginal atrophy-noted on exam.  Patient had mild discomfort with exam but denies any other issues.  Vaginal lubrication/moisturizer sheet given, to use accordingly.  4. menopause symptoms-continues to follow-up as per Dr. Janett Becker.  Was recently on testosterone cream and progesterone cream, now plans to continue testosterone cream but switch back to progesterone pill 100 mg.  She does note dizziness on this and was counseled by Dr. Janett Becker to expect this for the first 2 or 3 weeks until she stabilizes.  Strongly suggest she take this at night and continue with adequate hydration to limit the symptoms.  5.  History of pulmonary embolus-agree with recommendations to avoid estrogen given his history.  6.  History of possible endometriosis-was given this diagnosis previously.  No focal findings noted on prior ultrasound or exam today.  7.  Uterine myoma-1.3 cm fundal myoma was noted on ultrasound from 2/15/2024.  It is nonpalpable on exam.  Patient denies any symptoms.  No intervention is recommended.  8.  History of herpes-denies any current concerns  9.  History of //tubal ligation/ablation of endometrium  10.  Other-has not been sexually active for some time now.   is 70 with some erectile issues.  He did try  Viagra in the past without success.  Was recently prescribed Cialis by primary doctor.  He is unwilling to take it.  My support was given.  Suggested she discuss with him in detail.  Could consider referral to urologist as well.  Follow-up 1 year for yearly exam or as needed.    Subjective   Patient ID: Jessica Banda is a 54 y.o. female.    Vitals:    01/10/25 0659   BP: 116/72     HPI    The following portions of the patient's history were reviewed and updated as appropriate: allergies, current medications, past family history, past medical history, past social history, past surgical history, and problem list.  Past Medical History:   Diagnosis Date    Allergic     Allergic rhinitis     Anxiety     Arthritis     Depression     Eczema     Endometriosis     Fibromyalgia     GERD (gastroesophageal reflux disease)     Gestational diabetes 2000    Herpes 1996    Hyperlipidemia     Hypertension     Menopause ovarian failure     Migraine     Miscarriage     Nasal congestion     NSVT (nonsustained ventricular tachycardia) (formerly [facility]) 01/27/2024    Obesity     Pulmonary embolism (formerly [facility]) 2015    Was on hormone replacement therapy    s/p Medtronic loop recorder 1/27/2024 01/27/2024    Sleep apnea     on CPAP treatment    Sleep difficulties     Tachycardia     Vasospasm (formerly [facility])      Past Surgical History:   Procedure Laterality Date    BREAST BIOPSY  2008    CARDIAC CATHETERIZATION N/A 11/27/2023    Procedure: Cardiac catheterization;  Surgeon: Keyonna Prescott DO;  Location: [facility];  Service: Cardiology    CARDIAC CATHETERIZATION N/A 11/27/2023    Procedure: Cardiac Coronary Angiogram;  Surgeon: Keyonna Prescott DO;  Location: [facility];  Service: Cardiology    CARDIAC CATHETERIZATION Left 11/27/2023    Procedure: Cardiac Left Heart Cath;  Surgeon: Keyonna Prescott DO;  Location: [facility];  Service: Cardiology    CARDIAC ELECTROPHYSIOLOGY PROCEDURE N/A 1/27/2024    Procedure: Cardiac loop  recorder implant;  Surgeon: Caren Dumont PA-C;  Location: [facility];  Service: Cardiology    CARPAL TUNNEL RELEASE       SECTION  1991    CHOLECYSTECTOMY  2013    HEMORRHOID SURGERY  1998    KNEE SURGERY      MENISCECTOMY      TUBAL LIGATION      WISDOM TOOTH EXTRACTION       OB History    Para Term  AB Living   3 2 2  1 2   SAB IAB Ectopic Multiple Live Births   1    2      # Outcome Date GA Lbr Meliton/2nd Weight Sex Type Anes PTL Lv   3 SAB            2 Term      Vag-Spont   ESPINOZA   1 Term      CS-Unspec   ESPINOZA       Current Outpatient Medications:     ammonium lactate (LAC-HYDRIN) 12 % cream, APPLY TOPICALLY AS NEEDED FOR DRY SKIN, Disp: 385 g, Rfl: 0    diltiazem (CARDIZEM) 30 mg tablet, Take 1 tablet (30 mg total) by mouth 2 (two) times a day, Disp: 180 tablet, Rfl: 1    fluticasone (FLONASE) 50 mcg/act nasal spray, 1 spray into each nostril 2 (two) times a day, Disp: 1 Bottle, Rfl: 0    Linzess 145 MCG CAPS, , Disp: , Rfl:     other medication, see sig,, Medication/product name: testosterone 4 mg./ml cream  Strength: as above Sig (include dose, route, frequency): apply 0.5 ml to inner thigh daily, Disp: 15 mL, Rfl: 11    pantoprazole (PROTONIX) 40 mg tablet, Take 40 mg by mouth daily, Disp: , Rfl:     rosuvastatin (CRESTOR) 5 mg tablet, TAKE 1 TABLET DAILY, Disp: 90 tablet, Rfl: 1    azelastine (ASTELIN) 0.1 % nasal spray, 1 spray into each nostril 2 (two) times a day Use in each nostril as directed, Disp: 90 mL, Rfl: 5    B Complex Vitamins (VITAMIN B COMPLEX) TABS, Take by mouth (Patient not taking: Reported on 10/23/2024), Disp: , Rfl:     Cholecalciferol (VITAMIN D) 2000 units CAPS, Take by mouth (Patient not taking: Reported on 10/23/2024), Disp: , Rfl:     Coenzyme Q10 10 MG capsule, Take 10 mg by mouth daily (Patient not taking: Reported on 10/8/2024), Disp: , Rfl:     diclofenac sodium (VOLTAREN) 50 mg EC tablet, Take 1 tablet (50 mg total) by mouth 3  (three) times a day, Disp: 60 tablet, Rfl: 0    famotidine (PEPCID) 40 MG tablet, Take 1 tablet (40 mg total) by mouth daily at bedtime as needed for heartburn, Disp: 20 tablet, Rfl: 0    lactulose (CHRONULAC) 10 g/15 mL solution, Take 30 mL (20 g total) by mouth 2 (two) times a day as needed (Constipation), Disp: 240 mL, Rfl: 0    Magnesium Gluconate 250 MG TABS, Take by mouth daily, Disp: , Rfl:     nitrofurantoin (MACROBID) 100 mg capsule, Take 1 capsule (100 mg total) by mouth 2 (two) times a day (Patient not taking: Reported on 10/8/2024), Disp: 14 capsule, Rfl: 0    ondansetron (ZOFRAN-ODT) 4 mg disintegrating tablet, Take 1 tablet (4 mg total) by mouth every 8 (eight) hours as needed for nausea or vomiting, Disp: 30 tablet, Rfl: 1    Progesterone 100 MG CAPS, Take 100 mg by mouth at bedtime (Patient not taking: Reported on 10/23/2024), Disp: 90 capsule, Rfl: 3    sucralfate (CARAFATE) 1 g/10 mL suspension, Take 10 mL (1 g total) by mouth 4 (four) times a day (with meals and at bedtime), Disp: 1200 mL, Rfl: 0    tiZANidine (ZANAFLEX) 2 mg tablet, Take 1 tablet (2 mg total) by mouth every 8 (eight) hours as needed for muscle spasms (Patient not taking: Reported on 10/8/2024), Disp: 30 tablet, Rfl: 1  Allergies   Allergen Reactions    Acetazolamide Hives    Other Hives    Amoxicillin-Pot Clavulanate Hives    Iodinated Contrast Media      Needs to be prepped for IVC    Iothalamate Hives     Contrast dye    Oxycodone GI Intolerance and Vomiting    Sulfa Antibiotics Hives     Social History     Socioeconomic History    Marital status: /Civil Union     Spouse name: None    Number of children: None    Years of education: None    Highest education level: None   Occupational History    None   Tobacco Use    Smoking status: Never    Smokeless tobacco: Never   Vaping Use    Vaping status: Never Used   Substance and Sexual Activity    Alcohol use: Not Currently    Drug use: Never    Sexual activity: Not Currently  "    Partners: Male     Birth control/protection: Abstinence     Comment:  is not interested   Other Topics Concern    None   Social History Narrative    5 cats     Social Drivers of Health     Financial Resource Strain: Not on file   Food Insecurity: Not on file   Transportation Needs: Not on file   Physical Activity: Not on file   Stress: Not on file   Social Connections: Not on file   Intimate Partner Violence: Not on file   Housing Stability: Not on file     Family History   Problem Relation Age of Onset    Alzheimer's disease Father     Diabetes Father     Leukemia Mother     Cancer Mother         Leukemia       Review of Systems    Objective   Physical Exam  OBGyn Exam     Objective      /72 (BP Location: Right arm, Patient Position: Sitting)   Ht 5' 3\" (1.6 m)   Wt 95.9 kg (211 lb 6.4 oz)   LMP 01/12/2018 (Exact Date) Comment: post menopausal x 5 yrs.  BMI 37.45 kg/m²     General:   alert and oriented, in no acute distress   Neck: normal to inspection and palpation   Breast: normal appearance, no masses or tenderness   Heart:    Lungs:    Abdomen: soft, non-tender, without masses or organomegaly   Vulva: normal   Vagina: Mildly atrophic, without erythema or lesions or discharge.   Cervix: Mildly atrophic, without lesions or discharge or cervicitis.  No CMT   Uterus: top normal size, anteverted, non-tender   Adnexa: no mass, fullness, tenderness   Rectum: negative    Psych:  Normal mood and affect   Skin:  Without obvious lesions   Eyes: symmetric, with normal movements and reactivity   Musculoskeletal:  Normal muscle tone and movements appreciated       "

## 2025-02-06 DIAGNOSIS — L85.3 XEROSIS OF SKIN: ICD-10-CM

## 2025-02-06 DIAGNOSIS — E78.5 HYPERLIPIDEMIA, UNSPECIFIED HYPERLIPIDEMIA TYPE: ICD-10-CM

## 2025-02-07 RX ORDER — ROSUVASTATIN CALCIUM 5 MG/1
5 TABLET, COATED ORAL DAILY
Qty: 90 TABLET | Refills: 0 | Status: SHIPPED | OUTPATIENT
Start: 2025-02-07

## 2025-02-07 RX ORDER — AMMONIUM LACTATE 12 G/100G
CREAM TOPICAL AS NEEDED
Qty: 385 G | Refills: 0 | Status: SHIPPED | OUTPATIENT
Start: 2025-02-07

## 2025-02-19 ENCOUNTER — CONSULT (OUTPATIENT)
Dept: GASTROENTEROLOGY | Facility: MEDICAL CENTER | Age: 55
End: 2025-02-19
Payer: OTHER GOVERNMENT

## 2025-02-19 ENCOUNTER — TELEPHONE (OUTPATIENT)
Dept: GASTROENTEROLOGY | Facility: MEDICAL CENTER | Age: 55
End: 2025-02-19

## 2025-02-19 VITALS
TEMPERATURE: 97.6 F | SYSTOLIC BLOOD PRESSURE: 124 MMHG | BODY MASS INDEX: 36.88 KG/M2 | HEART RATE: 88 BPM | WEIGHT: 208.2 LBS | DIASTOLIC BLOOD PRESSURE: 82 MMHG

## 2025-02-19 DIAGNOSIS — K59.09 CHRONIC CONSTIPATION: Primary | ICD-10-CM

## 2025-02-19 DIAGNOSIS — R14.0 BLOATING: ICD-10-CM

## 2025-02-19 DIAGNOSIS — K21.9 GASTROESOPHAGEAL REFLUX DISEASE, UNSPECIFIED WHETHER ESOPHAGITIS PRESENT: ICD-10-CM

## 2025-02-19 PROCEDURE — 99244 OFF/OP CNSLTJ NEW/EST MOD 40: CPT | Performed by: INTERNAL MEDICINE

## 2025-02-19 RX ORDER — BISACODYL 5 MG/1
10 TABLET, DELAYED RELEASE ORAL ONCE
Qty: 2 TABLET | Refills: 0 | Status: SHIPPED | OUTPATIENT
Start: 2025-02-19 | End: 2025-02-19

## 2025-02-19 RX ORDER — SODIUM CHLORIDE, SODIUM LACTATE, POTASSIUM CHLORIDE, CALCIUM CHLORIDE 600; 310; 30; 20 MG/100ML; MG/100ML; MG/100ML; MG/100ML
125 INJECTION, SOLUTION INTRAVENOUS CONTINUOUS
OUTPATIENT
Start: 2025-02-19

## 2025-02-19 NOTE — PROGRESS NOTES
Name: Jessica Banda      : 1970      MRN: [id number]  Encounter Provider: Hernan Shook MD  Encounter Date: 2025   Encounter department: [facility] [place]  :  Assessment & Plan  Chronic constipation    Orders:    polyethylene glycol (GOLYTELY) 4000 mL solution; Take 4,000 mL by mouth once for 1 dose    bisacodyl (DULCOLAX) 5 mg EC tablet; Take 2 tablets (10 mg total) by mouth once for 1 dose    Colonoscopy; Future    TSH, 3rd generation; Future    Bloating  Patient was counseled on the importance of water and fiber intake.  Patient has new onset constipation since last year.  Rule out hypothyroidism  Plan for colonoscopy to rule out any obstructive lesions.  I reviewed her 31 page of wellness report, the data is not complete and I cannot comment on significance of the report.  All her questions were answered.       Gastroesophageal reflux disease, unspecified whether esophagitis present  Patient was counseled on the importance of lifestyle modification including avoiding food that can trigger symptoms, head elevation, avoiding lying down within 4 hours of eating.  Patient was counseled on the benefits and risks of PPI.  Patient was counseled on the correct use of PPI.           History of Present Illness   Jessica Banda is a 54 y.o. female who presents for evaluation of chronic constipation and abdominal bloating.  Patient followed [facility] for many years.  She is seeking a second opinion from us.  Patient reported she has been having chronic constipation from August to .  She reported she move her bowel once a week prior to use of laxative.  She pointed to the [place] stool scale at #1 as her stool consistency.  She tried Linzess with no improvement of her symptoms.  She started on MiraLAX twice daily with 2 bowel movement a day.  She still reported sense of incomplete evacuation.  She reported she drinks a lot of water and having high-fiber diet.  She  presented 31 page report of her wellness evaluation from an online placed listing pepsin deficiency   She has longtime acid reflux and she was prescribed with pantoprazole.  But she uses Pepcid on a as needed basis.  HPI  History obtained from: patient  Review of Systems A complete review of systems is negative other than that noted above in the HPI.          Medical History Reviewed by provider this encounter:     .  Current Outpatient Medications on File Prior to Visit   Medication Sig Dispense Refill    ammonium lactate (LAC-HYDRIN) 12 % cream APPLY TOPICALLY AS NEEDED FOR DRY SKIN 385 g 0    diltiazem (CARDIZEM) 30 mg tablet Take 1 tablet (30 mg total) by mouth 2 (two) times a day 180 tablet 1    other medication, see sig, Medication/product name: testosterone 4 mg./ml cream   Strength: as above  Sig (include dose, route, frequency): apply 0.5 ml to inner thigh daily 15 mL 11    pantoprazole (PROTONIX) 40 mg tablet Take 40 mg by mouth daily      rosuvastatin (CRESTOR) 5 mg tablet TAKE 1 TABLET DAILY 90 tablet 0    azelastine (ASTELIN) 0.1 % nasal spray 1 spray into each nostril 2 (two) times a day Use in each nostril as directed 90 mL 5    B Complex Vitamins (VITAMIN B COMPLEX) TABS Take by mouth (Patient not taking: Reported on 10/23/2024)      Cholecalciferol (VITAMIN D) 2000 units CAPS Take by mouth (Patient not taking: Reported on 10/23/2024)      fluticasone (FLONASE) 50 mcg/act nasal spray 1 spray into each nostril 2 (two) times a day 1 Bottle 0    ondansetron (ZOFRAN-ODT) 4 mg disintegrating tablet Take 1 tablet (4 mg total) by mouth every 8 (eight) hours as needed for nausea or vomiting 30 tablet 1    sucralfate (CARAFATE) 1 g/10 mL suspension Take 10 mL (1 g total) by mouth 4 (four) times a day (with meals and at bedtime) 1200 mL 0    tiZANidine (ZANAFLEX) 2 mg tablet Take 1 tablet (2 mg total) by mouth every 8 (eight) hours as needed for muscle spasms (Patient not taking: Reported on 10/8/2024) 30  tablet 1     No current facility-administered medications on file prior to visit.      Social History     Tobacco Use    Smoking status: Never    Smokeless tobacco: Never   Vaping Use    Vaping status: Never Used   Substance and Sexual Activity    Alcohol use: Not Currently    Drug use: Never    Sexual activity: Not Currently     Partners: Male     Birth control/protection: Abstinence     Comment:  is not interested        Objective   /82   Pulse 88   Temp 97.6 °F (36.4 °C)   Wt 94.4 kg (208 lb 3.2 oz)   LMP 01/12/2018 (Exact Date) Comment: post menopausal x 5 yrs.  BMI 36.88 kg/m²     Physical Exam       Lab Results: I personally reviewed relevant lab results.           Administrative Statements   I have spent a total time of 45 minutes in caring for this patient on the day of the visit/encounter including Diagnostic results, Instructions for management, Importance of tx compliance, Risk factor reductions, Impressions, Counseling / Coordination of care, Documenting in the medical record, Reviewing/placing orders in the medical record (including tests, medications, and/or procedures), and Obtaining or reviewing history  .

## 2025-02-19 NOTE — ASSESSMENT & PLAN NOTE
Patient was counseled on the importance of lifestyle modification including avoiding food that can trigger symptoms, head elevation, avoiding lying down within 4 hours of eating.  Patient was counseled on the benefits and risks of PPI.  Patient was counseled on the correct use of PPI.

## 2025-02-19 NOTE — TELEPHONE ENCOUNTER
Procedure: colonoscopy  Date: 02/24/2025  Physician performing: Dr. Shook  Location of procedure:    Instructions given to patient: Yes  Diabetic: No  Clearances: NA

## 2025-02-20 ENCOUNTER — TELEPHONE (OUTPATIENT)
Dept: GASTROENTEROLOGY | Facility: HOSPITAL | Age: 55
End: 2025-02-20

## 2025-02-24 ENCOUNTER — HOSPITAL ENCOUNTER (OUTPATIENT)
Dept: GASTROENTEROLOGY | Facility: HOSPITAL | Age: 55
Setting detail: OUTPATIENT SURGERY
Discharge: HOME | End: 2025-02-24
Attending: INTERNAL MEDICINE
Payer: OTHER GOVERNMENT

## 2025-02-24 ENCOUNTER — ANESTHESIA EVENT (OUTPATIENT)
Dept: GASTROENTEROLOGY | Facility: HOSPITAL | Age: 55
End: 2025-02-24
Payer: OTHER GOVERNMENT

## 2025-02-24 ENCOUNTER — ANESTHESIA (OUTPATIENT)
Dept: GASTROENTEROLOGY | Facility: HOSPITAL | Age: 55
End: 2025-02-24
Payer: OTHER GOVERNMENT

## 2025-02-24 VITALS
RESPIRATION RATE: 18 BRPM | OXYGEN SATURATION: 100 % | TEMPERATURE: 97.1 F | SYSTOLIC BLOOD PRESSURE: 122 MMHG | DIASTOLIC BLOOD PRESSURE: 74 MMHG | HEART RATE: 73 BPM

## 2025-02-24 DIAGNOSIS — K59.09 CHRONIC CONSTIPATION: ICD-10-CM

## 2025-02-24 PROCEDURE — 45378 DIAGNOSTIC COLONOSCOPY: CPT | Performed by: INTERNAL MEDICINE

## 2025-02-24 RX ORDER — PROPOFOL 10 MG/ML
INJECTION, EMULSION INTRAVENOUS CONTINUOUS PRN
Status: DISCONTINUED | OUTPATIENT
Start: 2025-02-24 | End: 2025-02-24

## 2025-02-24 RX ORDER — HYDROMORPHONE HCL/PF 1 MG/ML
0.5 SYRINGE (ML) INJECTION
Refills: 0 | Status: CANCELLED | OUTPATIENT
Start: 2025-02-24

## 2025-02-24 RX ORDER — SODIUM CHLORIDE, SODIUM LACTATE, POTASSIUM CHLORIDE, CALCIUM CHLORIDE 600; 310; 30; 20 MG/100ML; MG/100ML; MG/100ML; MG/100ML
INJECTION, SOLUTION INTRAVENOUS CONTINUOUS PRN
Status: DISCONTINUED | OUTPATIENT
Start: 2025-02-24 | End: 2025-02-24

## 2025-02-24 RX ORDER — SODIUM CHLORIDE, SODIUM LACTATE, POTASSIUM CHLORIDE, CALCIUM CHLORIDE 600; 310; 30; 20 MG/100ML; MG/100ML; MG/100ML; MG/100ML
125 INJECTION, SOLUTION INTRAVENOUS CONTINUOUS
Status: DISCONTINUED | OUTPATIENT
Start: 2025-02-24 | End: 2025-02-28 | Stop reason: HOSPADM

## 2025-02-24 RX ORDER — PROPOFOL 10 MG/ML
INJECTION, EMULSION INTRAVENOUS AS NEEDED
Status: DISCONTINUED | OUTPATIENT
Start: 2025-02-24 | End: 2025-02-24

## 2025-02-24 RX ADMIN — PROPOFOL 100 MG: 10 INJECTION, EMULSION INTRAVENOUS at 07:33

## 2025-02-24 RX ADMIN — SODIUM CHLORIDE, SODIUM LACTATE, POTASSIUM CHLORIDE, AND CALCIUM CHLORIDE 125 ML/HR: .6; .31; .03; .02 INJECTION, SOLUTION INTRAVENOUS at 07:25

## 2025-02-24 RX ADMIN — PROPOFOL 50 MG: 10 INJECTION, EMULSION INTRAVENOUS at 07:37

## 2025-02-24 RX ADMIN — PROPOFOL 50 MG: 10 INJECTION, EMULSION INTRAVENOUS at 07:35

## 2025-02-24 RX ADMIN — PROPOFOL 100 MCG/KG/MIN: 10 INJECTION, EMULSION INTRAVENOUS at 07:39

## 2025-02-24 RX ADMIN — SODIUM CHLORIDE, SODIUM LACTATE, POTASSIUM CHLORIDE, AND CALCIUM CHLORIDE: .6; .31; .03; .02 INJECTION, SOLUTION INTRAVENOUS at 07:22

## 2025-02-24 NOTE — ANESTHESIA PREPROCEDURE EVALUATION
Procedure:  COLONOSCOPY    Relevant Problems   CARDIO   (+) Hyperlipidemia   (+) Non-cardiac chest pain   (+) Vasospastic angina (HCC)   (+) Venous insufficiency      GI/HEPATIC   (+) Acute pancreatitis   (+) GERD (gastroesophageal reflux disease)   (+) Hepatic steatosis      MUSCULOSKELETAL   (+) Fibromyalgia   (+) Low back pain      NEURO/PSYCH   (+) Anxiety   (+) Fibromyalgia   (+) Panic disorder      PULMONARY   (+) Sleep apnea      Surgery/Wound/Pain   (+) s/p Medtronic loop recorder 1/27/2024      Other   (+) Severe obesity (BMI 35.0-39.9) with comorbidity (HCC)      Medtronic loop recorder in place; NSR @ 60  LOLY on CPAP    Physical Exam    Airway    Mallampati score: II  TM Distance: >3 FB  Neck ROM: full     Dental   No notable dental hx     Cardiovascular      Pulmonary      Other Findings  post-pubertal.      Anesthesia Plan  ASA Score- 3     Anesthesia Type- IV sedation with anesthesia with ASA Monitors.         Additional Monitors:     Airway Plan:            Plan Factors-Exercise tolerance (METS): >4 METS.    Chart reviewed.    Patient summary reviewed.    Patient is not a current smoker.      Obstructive sleep apnea risk education given perioperatively.        Induction- intravenous.    Postoperative Plan-     Perioperative Resuscitation Plan - Level 1 - Full Code.       Informed Consent- Anesthetic plan and risks discussed with patient.  I personally reviewed this patient with the CRNA. Discussed and agreed on the Anesthesia Plan with the CRNA..      NPO Status:  Vitals Value Taken Time   Date of last liquid 02/24/25 02/24/25 0700   Time of last liquid 0300 02/24/25 0700   Date of last solid 02/22/25 02/24/25 0700   Time of last solid 2000 02/24/25 0700

## 2025-02-24 NOTE — ANESTHESIA POSTPROCEDURE EVALUATION
Post-Op Assessment Note    CV Status:  Stable  Pain Score: 0    Pain management: adequate       Mental Status:  Sleepy   Hydration Status:  Stable   PONV Controlled:  None   Airway Patency:  Patent  There is a medical reason for not screening for obstructive sleep apnea and/or for not using two or more mitigation strategies   Post Op Vitals Reviewed: Yes    No anethesia notable event occurred.    Staff: CRNA           Last Filed PACU Vitals:  Vitals Value Taken Time   Temp     Pulse 75    /65    Resp 18    SpO2 98

## 2025-02-27 ENCOUNTER — RESULTS FOLLOW-UP (OUTPATIENT)
Dept: NON INVASIVE DIAGNOSTICS | Facility: HOSPITAL | Age: 55
End: 2025-02-27

## 2025-02-27 ENCOUNTER — REMOTE DEVICE CLINIC VISIT (OUTPATIENT)
Dept: CARDIOLOGY CLINIC | Facility: CLINIC | Age: 55
End: 2025-02-27
Payer: OTHER GOVERNMENT

## 2025-02-27 DIAGNOSIS — R00.2 PALPITATIONS: Primary | ICD-10-CM

## 2025-02-27 PROCEDURE — 93298 REM INTERROG DEV EVAL SCRMS: CPT | Performed by: INTERNAL MEDICINE

## 2025-04-22 ENCOUNTER — APPOINTMENT (OUTPATIENT)
Age: 55
End: 2025-04-22
Payer: OTHER GOVERNMENT

## 2025-04-22 DIAGNOSIS — K59.09 CHRONIC CONSTIPATION: ICD-10-CM

## 2025-04-22 LAB — TSH SERPL DL<=0.05 MIU/L-ACNC: 1.96 UIU/ML (ref 0.45–4.5)

## 2025-04-22 PROCEDURE — 84443 ASSAY THYROID STIM HORMONE: CPT

## 2025-04-22 PROCEDURE — 36415 COLL VENOUS BLD VENIPUNCTURE: CPT

## 2025-04-25 DIAGNOSIS — K21.9 GASTROESOPHAGEAL REFLUX DISEASE, UNSPECIFIED WHETHER ESOPHAGITIS PRESENT: Primary | ICD-10-CM

## 2025-04-28 RX ORDER — PANTOPRAZOLE SODIUM 40 MG/1
40 TABLET, DELAYED RELEASE ORAL 2 TIMES DAILY
Qty: 180 TABLET | Refills: 1 | Status: SHIPPED | OUTPATIENT
Start: 2025-04-28

## 2025-05-05 ENCOUNTER — RESULTS FOLLOW-UP (OUTPATIENT)
Dept: GASTROENTEROLOGY | Facility: MEDICAL CENTER | Age: 55
End: 2025-05-05

## 2025-05-09 ENCOUNTER — APPOINTMENT (OUTPATIENT)
Dept: RADIOLOGY | Facility: MEDICAL CENTER | Age: 55
End: 2025-05-09
Attending: ORTHOPAEDIC SURGERY
Payer: OTHER GOVERNMENT

## 2025-05-09 ENCOUNTER — OFFICE VISIT (OUTPATIENT)
Dept: OBGYN CLINIC | Facility: MEDICAL CENTER | Age: 55
End: 2025-05-09
Payer: OTHER GOVERNMENT

## 2025-05-09 VITALS — BODY MASS INDEX: 37.28 KG/M2 | HEIGHT: 63 IN | WEIGHT: 210.4 LBS

## 2025-05-09 DIAGNOSIS — Z96.651 HISTORY OF PROSTHETIC UNICOMPARTMENTAL ARTHROPLASTY OF RIGHT KNEE: ICD-10-CM

## 2025-05-09 DIAGNOSIS — M25.562 LEFT KNEE PAIN, UNSPECIFIED CHRONICITY: ICD-10-CM

## 2025-05-09 DIAGNOSIS — M17.12 PRIMARY OSTEOARTHRITIS OF LEFT KNEE: Primary | ICD-10-CM

## 2025-05-09 DIAGNOSIS — M76.31 IT BAND SYNDROME, RIGHT: ICD-10-CM

## 2025-05-09 PROCEDURE — 20610 DRAIN/INJ JOINT/BURSA W/O US: CPT | Performed by: ORTHOPAEDIC SURGERY

## 2025-05-09 PROCEDURE — 73564 X-RAY EXAM KNEE 4 OR MORE: CPT

## 2025-05-09 PROCEDURE — 99204 OFFICE O/P NEW MOD 45 MIN: CPT | Performed by: ORTHOPAEDIC SURGERY

## 2025-05-09 PROCEDURE — 73562 X-RAY EXAM OF KNEE 3: CPT

## 2025-05-09 RX ORDER — TRIAMCINOLONE ACETONIDE 40 MG/ML
40 INJECTION, SUSPENSION INTRA-ARTICULAR; INTRAMUSCULAR
Status: COMPLETED | OUTPATIENT
Start: 2025-05-09 | End: 2025-05-09

## 2025-05-09 RX ORDER — BUPIVACAINE HYDROCHLORIDE 2.5 MG/ML
2 INJECTION, SOLUTION INFILTRATION; PERINEURAL
Status: COMPLETED | OUTPATIENT
Start: 2025-05-09 | End: 2025-05-09

## 2025-05-09 RX ADMIN — BUPIVACAINE HYDROCHLORIDE 2 ML: 2.5 INJECTION, SOLUTION INFILTRATION; PERINEURAL at 15:00

## 2025-05-09 RX ADMIN — TRIAMCINOLONE ACETONIDE 40 MG: 40 INJECTION, SUSPENSION INTRA-ARTICULAR; INTRAMUSCULAR at 15:00

## 2025-05-09 NOTE — PROGRESS NOTES
Name: Jessica Banda      : 1970      MRN: [id number]  Encounter Provider: Marilyn Lyles DO  Encounter Date: 2025   Encounter department: [facility]  :  Assessment & Plan  Primary osteoarthritis of left knee    Patient has moderate to severe left knee osteoarthritis.  Patient is also 2.5 years status post right knee unicompartmental knee replacement with Dr. Tellez on 2022.  IT band syndrome.  Treatment options were discussed with patient at today's visit.    Offered to have bone scan/workup for right knee.  Patient would like to hold off on this as she currently does not want to seek surgical intervention.  We can reconsider in the future.  X-rays obtained and reviewed today.    Injections: Patient received left knee steroid injection today. Tolerated the procedure well. Post injection instructions reviewed including information on glucose monitoring for diabetic patients. Patient aware that they may repeat steroid injection every 3 months if needed.   Medications: Tylenol up to 3000 mg per day and OTC NSAID prn pain  Ice, heat, Topical gel as needed  PT: PT script provided. Patient may go for education on a home exercise program.   Bracing: Patient declined knee brace.   Activity: Continue activity as tolerated.   Orders:    XR knee 4+ vw left injury; Future    Large joint arthrocentesis: L knee    Ambulatory Referral to Physical Therapy; Future    History of prosthetic unicompartmental arthroplasty of right knee    Orders:    XR knee 3 vw right non injury; Future    Ambulatory Referral to Physical Therapy; Future    It band syndrome, right    Orders:    Ambulatory Referral to Physical Therapy; Future          Return in about 3 months (around 2025) for Recheck.    I answered all of the patient's questions during the visit and provided education of the patient's condition during the visit.  The patient verbalized understanding of the  information given and agrees with the plan.  This note was dictated using [employer] software.  It may contain errors including improperly dictated words.  Please contact physician directly for any questions.    History of Present Illness   HPI  Chief complaint: No chief complaint on file.      HPI: Jessica Banda is a 54 y.o. female that c/o bilateral knee pain.   Length of time knee pain has been present: 1-2 years  Any falls or trauma associated with onset of pain: no angeline   Location of pain: posterior lateral right knee, generalized left   Intermittent or constant: constant   Description of pain: achy and sharp   Aggravating factors: steps, prolonged walking, sitting to standing   Instability or locking: yes   Pain medication that has been tried: tylenol as needed  Topical mediation that has been tried: no  Has heat/ice/elevation been tried: no  Can NSAIDs be taken?  If not why?: yes  Has PT or home exercises been tried?: HEP learned for IT band syndrome  Has bracing been tried? OTC or rx?  no  Have injections been tried?  Steroid/visco?: right knee CSI and gel injections in 2022, none to left knee  Any history of surgery on that knee?:  Approximately 2.5 years status post right unicompartmental knee replacement done with Dr. Tellez 11/21/22.     ROS:    See HPI for musculoskeletal review.   All other systems reviewed are negative     Historical Information   Past Medical History:   Diagnosis Date    Allergic     Allergic rhinitis     Anxiety     Arthritis     Depression     Ear problems     Eczema     Endometriosis     Fibromyalgia     GERD (gastroesophageal reflux disease)     Gestational diabetes 2000    Herpes 1996    Hyperlipidemia     Hypertension     Menopause ovarian failure     Migraine     Miscarriage     Nasal congestion     NSVT (nonsustained ventricular tachycardia) ([facility]) 01/27/2024    Obesity     Pulmonary embolism ([facility]) 2015    Was on hormone replacement therapy    s/p [employer] loop recorder  2024    Sleep apnea     on CPAP treatment    Sleep difficulties     Tachycardia     TMJ dysfunction     Tonsillitis     Vasospasm ([facility])      Past Surgical History:   Procedure Laterality Date    BREAST BIOPSY      CARDIAC CATHETERIZATION N/A 2023    Procedure: Cardiac catheterization;  Surgeon: Keyonna Prescott DO;  Location: [facility];  Service: Cardiology    CARDIAC CATHETERIZATION N/A 2023    Procedure: Cardiac Coronary Angiogram;  Surgeon: Keyonna Prescott DO;  Location: [facility];  Service: Cardiology    CARDIAC CATHETERIZATION Left 2023    Procedure: Cardiac Left Heart Cath;  Surgeon: Keyonna Prescott DO;  Location: [facility];  Service: Cardiology    CARDIAC ELECTROPHYSIOLOGY PROCEDURE N/A 2024    Procedure: Cardiac loop recorder implant;  Surgeon: Caren Dumont PA-C;  Location: [facility];  Service: Cardiology    CARPAL TUNNEL RELEASE       SECTION  1991    CHOLECYSTECTOMY  2013    EPIDURAL BLOCK INJECTION      HEMORRHOID SURGERY  1998    KNEE SURGERY      MENISCECTOMY      TUBAL LIGATION      WISDOM TOOTH EXTRACTION       Social History   Social History     Substance and Sexual Activity   Alcohol Use Not Currently     Social History     Substance and Sexual Activity   Drug Use Never     Social History     Tobacco Use   Smoking Status Never   Smokeless Tobacco Never     Family History:   Family History   Problem Relation Age of Onset    Alzheimer's disease Father     Diabetes Father     Leukemia Mother     Cancer Mother         Leukemia       Current Outpatient Medications on File Prior to Visit   Medication Sig Dispense Refill    Cholecalciferol (VITAMIN D) 2000 units CAPS Take by mouth      diltiazem (CARDIZEM) 30 mg tablet Take 1 tablet (30 mg total) by mouth 2 (two) times a day 180 tablet 1    other medication, see sig, Medication/product name: testosterone 4 mg./ml cream   Strength: as  above  Sig (include dose, route, frequency): apply 0.5 ml to inner thigh daily 15 mL 11    pantoprazole (PROTONIX) 40 mg tablet TAKE 1 TABLET TWICE A  tablet 1    rosuvastatin (CRESTOR) 5 mg tablet TAKE 1 TABLET DAILY 90 tablet 3    ammonium lactate (LAC-HYDRIN) 12 % cream APPLY TOPICALLY AS NEEDED FOR DRY SKIN 385 g 0    azelastine (ASTELIN) 0.1 % nasal spray 1 spray into each nostril 2 (two) times a day Use in each nostril as directed 90 mL 5    B Complex Vitamins (VITAMIN B COMPLEX) TABS Take by mouth (Patient not taking: Reported on 10/23/2024)      bisacodyl (DULCOLAX) 5 mg EC tablet Take 2 tablets (10 mg total) by mouth once for 1 dose 2 tablet 0    fluticasone (FLONASE) 50 mcg/act nasal spray 1 spray into each nostril 2 (two) times a day 1 Bottle 0    ondansetron (ZOFRAN-ODT) 4 mg disintegrating tablet Take 1 tablet (4 mg total) by mouth every 8 (eight) hours as needed for nausea or vomiting 30 tablet 1    polyethylene glycol (GOLYTELY) 4000 mL solution Take 4,000 mL by mouth once for 1 dose 4000 mL 0    sucralfate (CARAFATE) 1 g/10 mL suspension Take 10 mL (1 g total) by mouth 4 (four) times a day (with meals and at bedtime) 1200 mL 0    tiZANidine (ZANAFLEX) 2 mg tablet Take 1 tablet (2 mg total) by mouth every 8 (eight) hours as needed for muscle spasms (Patient not taking: Reported on 10/8/2024) 30 tablet 1     No current facility-administered medications on file prior to visit.     Allergies   Allergen Reactions    Acetazolamide Hives    Other Hives    Amoxicillin-Pot Clavulanate Hives    Iodinated Contrast Media      Needs to be prepped for IVC    Iothalamate Hives     Contrast dye    Oxycodone GI Intolerance and Vomiting    Sulfa Antibiotics Hives       Current Outpatient Medications on File Prior to Visit   Medication Sig Dispense Refill    Cholecalciferol (VITAMIN D) 2000 units CAPS Take by mouth      diltiazem (CARDIZEM) 30 mg tablet Take 1 tablet (30 mg total) by mouth 2 (two) times a day  "180 tablet 1    other medication, see sig, Medication/product name: testosterone 4 mg./ml cream   Strength: as above  Sig (include dose, route, frequency): apply 0.5 ml to inner thigh daily 15 mL 11    pantoprazole (PROTONIX) 40 mg tablet TAKE 1 TABLET TWICE A  tablet 1    rosuvastatin (CRESTOR) 5 mg tablet TAKE 1 TABLET DAILY 90 tablet 3    ammonium lactate (LAC-HYDRIN) 12 % cream APPLY TOPICALLY AS NEEDED FOR DRY SKIN 385 g 0    azelastine (ASTELIN) 0.1 % nasal spray 1 spray into each nostril 2 (two) times a day Use in each nostril as directed 90 mL 5    B Complex Vitamins (VITAMIN B COMPLEX) TABS Take by mouth (Patient not taking: Reported on 10/23/2024)      bisacodyl (DULCOLAX) 5 mg EC tablet Take 2 tablets (10 mg total) by mouth once for 1 dose 2 tablet 0    fluticasone (FLONASE) 50 mcg/act nasal spray 1 spray into each nostril 2 (two) times a day 1 Bottle 0    ondansetron (ZOFRAN-ODT) 4 mg disintegrating tablet Take 1 tablet (4 mg total) by mouth every 8 (eight) hours as needed for nausea or vomiting 30 tablet 1    polyethylene glycol (GOLYTELY) 4000 mL solution Take 4,000 mL by mouth once for 1 dose 4000 mL 0    sucralfate (CARAFATE) 1 g/10 mL suspension Take 10 mL (1 g total) by mouth 4 (four) times a day (with meals and at bedtime) 1200 mL 0    tiZANidine (ZANAFLEX) 2 mg tablet Take 1 tablet (2 mg total) by mouth every 8 (eight) hours as needed for muscle spasms (Patient not taking: Reported on 10/8/2024) 30 tablet 1     No current facility-administered medications on file prior to visit.         Objective   Ht 5' 3\" (1.6 m)   Wt 95.4 kg (210 lb 6.4 oz)   LMP 01/12/2018 (Exact Date) Comment: post menopausal x 5 yrs.  BMI 37.27 kg/m²        PE:  AAOx 3  WDWN  Hearing intact, no drainage from eyes  Regular rate  no audible wheezing  no abdominal distension  LE compartments soft, skin intact    Ortho Exam:  bilateralknee:    Appearance:  no swelling   No bruising  no obvious joint deformity   No " effusion  Palpation/Tenderness:  +TTP over medial joint line, no TTP over lateral joint line or over patella/patellar tendon  Active Range of Motion:  AROM: 0-120  Special Tests:  Medial Ana María's Test:  negative  Lateral Ana María's Test:  Negative  Apley's compression test:  Negative  Lachman's Test:  negative  Anterior Drawer Test:  negative  Valgus Stress Test:  negative  Varus Stress Test:  negative       No ipsilateral hip pain with ROM    bilateralLE:    Sensation grossly intact  Palpable 2+ pulse  AT/GS/EHL intact    Imaging Studies: Results Review Statement: I personally reviewed the following image studies in PACS and associated radiology reports: xray(s). My interpretation of the radiology images/reports is: Moderate to severe left knee osteoarthritis, status post right knee unicompartmental knee replacement with adequate alignment of implants.    Large joint arthrocentesis: L knee    Performed by: Marilyn Lyles DO  Authorized by: Marilyn Lyles DO    Universal Protocol:  procedure performed by consultantConsent: Verbal consent obtained.  Risks and benefits: risks, benefits and alternatives were discussed  Consent given by: patient  Patient understanding: patient states understanding of the procedure being performed  Site marked: the operative site was marked  Patient identity confirmed: verbally with patient  Supporting Documentation  Indications: pain     Is this a Visco injection? NoProcedure Details  Location: knee - L knee  Needle size: 22 G  Ultrasound guidance: no  Approach: anterolateral  Medications administered: 2 mL bupivacaine 0.25 %; 40 mg triamcinolone acetonide 40 mg/mL    Patient tolerance: patient tolerated the procedure well with no immediate complications  Dressing:  Sterile dressing applied           Scribe Attestation      I,:  Ricky Bueno am acting as a scribe while in the presence of the attending physician.:       I,:  Marilyn Lyles DO personally  performed the services described in this documentation    as scribed in my presence.:

## 2025-05-13 ENCOUNTER — REMOTE DEVICE CLINIC VISIT (OUTPATIENT)
Dept: CARDIOLOGY CLINIC | Facility: CLINIC | Age: 55
End: 2025-05-13
Payer: OTHER GOVERNMENT

## 2025-05-13 DIAGNOSIS — R00.2 PALPITATIONS: Primary | ICD-10-CM

## 2025-05-13 PROCEDURE — 93298 REM INTERROG DEV EVAL SCRMS: CPT | Performed by: INTERNAL MEDICINE

## 2025-05-13 NOTE — PROGRESS NOTES
"Results for orders placed or performed in visit on 05/13/25   Cardiac EP device report    Narrative    CARELINK TRANSMISSION: BATTERY STATUS \"OK.\" NO PATIENT OR DEVICE ACTIVATED EPISODES. PVC BURDEN 0%â€”PALOMINO        "

## 2025-05-14 ENCOUNTER — RESULTS FOLLOW-UP (OUTPATIENT)
Dept: CARDIOLOGY CLINIC | Facility: CLINIC | Age: 55
End: 2025-05-14

## 2025-05-15 ENCOUNTER — HOSPITAL ENCOUNTER (OUTPATIENT)
Dept: ULTRASOUND IMAGING | Facility: HOSPITAL | Age: 55
Discharge: HOME | End: 2025-05-15
Attending: PHYSICIAN ASSISTANT
Payer: OTHER GOVERNMENT

## 2025-05-15 DIAGNOSIS — R22.1 NECK SWELLING: ICD-10-CM

## 2025-05-15 PROCEDURE — 76536 US EXAM OF HEAD AND NECK: CPT

## 2025-05-19 DIAGNOSIS — N95.1 MENOPAUSAL SYMPTOMS: ICD-10-CM

## 2025-05-23 ENCOUNTER — EVALUATION (OUTPATIENT)
Dept: PHYSICAL THERAPY | Facility: MEDICAL CENTER | Age: 55
End: 2025-05-23
Attending: ORTHOPAEDIC SURGERY
Payer: OTHER GOVERNMENT

## 2025-05-23 DIAGNOSIS — M17.12 PRIMARY OSTEOARTHRITIS OF LEFT KNEE: ICD-10-CM

## 2025-05-23 DIAGNOSIS — M76.31 ILIOTIBIAL BAND SYNDROME OF RIGHT SIDE: Primary | ICD-10-CM

## 2025-05-23 DIAGNOSIS — Z96.651 HISTORY OF PROSTHETIC UNICOMPARTMENTAL ARTHROPLASTY OF RIGHT KNEE: ICD-10-CM

## 2025-05-23 PROCEDURE — 97161 PT EVAL LOW COMPLEX 20 MIN: CPT | Performed by: PHYSICAL THERAPIST

## 2025-05-23 PROCEDURE — 97110 THERAPEUTIC EXERCISES: CPT | Performed by: PHYSICAL THERAPIST

## 2025-05-23 NOTE — PROGRESS NOTES
PT Evaluation     Today's date: 2025  Patient name: Jessica Banda  : 1970  MRN: [id number]  Referring provider: Marilyn Lyles,*  Dx:   Encounter Diagnosis     ICD-10-CM    1. Iliotibial band syndrome of right side  M76.31       2. Primary osteoarthritis of left knee  M17.12       3. History of prosthetic unicompartmental arthroplasty of right knee  Z96.651                      Assessment  Impairments: abnormal muscle firing, abnormal muscle tone, abnormal or restricted ROM, activity intolerance, impaired balance, impaired physical strength, lacks appropriate home exercise program, pain with function, participation limitations and activity limitations  Functional limitations: long distance ambulation, standing after periods of prolonged sitting, standing for long periods, stair negotiation  Symptom irritability: low    Assessment details: Jessica Banda is a pleasant 54 y.o. female who presents with chronic b/l knee pain (R worse than L). She has a hx of R knee unicompartmental arthroplasty from . Her L knee symptoms have improved since recent injection on . However, she continues to have pain in the lateral aspect of her R knee/lateral thigh. No further referral is necessary at this time based upon examination results.    Primary movement impairment is R quadriceps weakness, which results in signs and symptoms consistent with chronic mechanical knee pain, and limits her ability to stand for long periods of time and ascend stairs. Patient also presents with soft tissue tension in R TFL/ITB, which further limits her ability to perform sit to stand transfers. In addition, weakness in her R hip girdle musculature contributes to compensatory stress on her R knee when descending stairs. Performed gentle roller STM to R TFL/ITB/lateral thigh to address soft tissue tightness, and patient tolerated manual therapy well. Patient was educated in an illustrated HEP for TFL/ITB flexibility and quad/hip  "girdle strengthening. She was also educated in \"step to\" mechanics for stair negotiation. Patient would benefit from skilled PT services to address the listed impairments to facilitate a return to PLOF. Thank you for the referral.  Barriers to therapy: none  Understanding of Dx/Px/POC: good     Prognosis: good  Prognosis details: Positive prognostic factors include positive attitude towards recovery. No negative prognostic factors.    Goals  STG:  Patient will be independent with home exercise program.   Patient will demonstrate at least 2-3 deg improvement in R knee EXT AROM to reduce compensatory stress on R TFL/ITB during ADL.  LTG:  Patient will increase R quadriceps strength to at least 4+/5 to be able to ascend stairs.  Patient will increase R hip ABD strength to at least 4 to 4+/5 to reduce stress on R knee when descending stairs.  Patient will demonstrate at least 90% reduction in soft tissue tension in R TFL/ITB/lateral thigh musculature to improve ability to perform transfers.  Patient will be able to negotiate stairs.  Patient will be able to ambulate longer distances.  Patient will be able to manage symptoms independently.     Plan  Patient would benefit from: skilled physical therapy  Referral necessary: No  Planned modality interventions: cryotherapy and thermotherapy: hydrocollator packs    Planned therapy interventions: abdominal trunk stabilization, activity modification, IASTM, joint mobilization, kinesiology taping, manual therapy, massage, Kaufman taping, ADL training, balance, balance/weight bearing training, neuromuscular re-education, body mechanics training, patient/caregiver education, postural training, strengthening, stretching, flexibility, functional ROM exercises, graded activity, graded exercise, home exercise program, therapeutic activities, therapeutic exercise and transfer training    Frequency: 1x week  Duration in weeks: 4  Plan of Care beginning date: 5/23/2025  Plan of Care " expiration date: 2025  Treatment plan discussed with: patient  Plan details: Prognosis is above given PT services 1x/week for 4 weeks and given HEP adherence.      Subjective Evaluation    History of Present Illness  Mechanism of injury: This is a 54 y.o. female presenting with b/l knee pain (R worse than L). She has a hx of R knee unicompartmental arthroplasty from  and also had an arthroscopic R knee surgery approximately 6 years ago. She reports that she has pain in the outside of her R knee/outside of her R thigh in her ITB region. She also has difficulty going up and down the stairs due to feeling weakness in her R knee. She has a hx of L knee pain that improved significantly with recent steroid injection on . Her biggest goals are to be able to go up and down the stairs, walk longer distances, and reduce the stiffness that she experiences when standing after periods of prolonged sitting.  Quality of life: excellent    Patient Goals  Patient goal: to be able to walk longer distances  Pain  Current pain ratin  At best pain ratin  At worst pain ratin  Location: outside of R knee/thigh  Quality: sharp  Relieving factors: medications (Tylenol)  Aggravating factors: stair climbing, standing and walking (standing up after periods of prolonged sitting)      Diagnostic Tests  Abnormal x-ray: per chart- 25- L knee- osteochondral defect medial femoral condyle with mild overall osteoarthritis; R knee- intact hardware.      Objective     Palpation     Right   Hypertonic in the TFL and vastus lateralis.   Tenderness of the TFL and vastus lateralis.     Tenderness     Right Knee   Tenderness in the ITB.     Active Range of Motion   Left Knee   Flexion: 120 degrees   Extension: 1 degrees     Right Knee   Flexion: 118 degrees   Extension: 3 degrees     Strength/Myotome Testing     Left Hip   Planes of Motion   Abduction: 4    Right Hip   Planes of Motion   Abduction: 3+    Left Knee   Flexion:  "5  Extension: 5  Quadriceps contraction: good    Right Knee   Flexion: 4-  Extension: 4  Quadriceps contraction: fair    Functional Assessment      Squat    Pain, left tibial anterior translation beyond toes and right tibial anterior translation beyond toes.     Single Leg Squat   Left Leg  Sitting toward left side, valgus and anterior tibial translation beyond toes.     Right Leg  Unable to perform .     Single Leg Stance   Left: 3 seconds  Right: 3 seconds             Precautions: hx of R knee unicompartmental arthroplasty 2022, hx of PE, loop recorder    HEP: supine ITB stretch with strap, supine calf stretch, QS (towel knee), supine clams (RTB)  Manuals 5/23            Roller STM to R TFL/ITB/lateral thigh KP             x5'                                      Neuro Re-Ed                                                                                                        Ther Ex             Rec bike SHELL             Supine strap ITB stretch 20\"x3 HEP            Supine calf stretch with strap 30\"x3 HEP            QS 5\"x20 towel knee HEP Towel ankle NV           SLR NV            SAQ NV            LAQ NV            Standing hip ABD NV            Seated HS stretch NV            HEP education and instruction x10'            Ther Activity                                       Gait Training                                       Modalities                                              " 22

## 2025-05-28 ENCOUNTER — OFFICE VISIT (OUTPATIENT)
Age: 55
End: 2025-05-28
Payer: OTHER GOVERNMENT

## 2025-05-28 VITALS
WEIGHT: 213.2 LBS | HEART RATE: 88 BPM | OXYGEN SATURATION: 99 % | BODY MASS INDEX: 37.78 KG/M2 | SYSTOLIC BLOOD PRESSURE: 126 MMHG | TEMPERATURE: 98.3 F | DIASTOLIC BLOOD PRESSURE: 80 MMHG | HEIGHT: 63 IN

## 2025-05-28 DIAGNOSIS — E53.8 VITAMIN B12 DEFICIENCY: ICD-10-CM

## 2025-05-28 DIAGNOSIS — Z00.00 ROUTINE ADULT HEALTH MAINTENANCE: ICD-10-CM

## 2025-05-28 DIAGNOSIS — E66.01 SEVERE OBESITY (BMI 35.0-39.9) WITH COMORBIDITY (HCC): ICD-10-CM

## 2025-05-28 DIAGNOSIS — E78.5 HYPERLIPIDEMIA, UNSPECIFIED HYPERLIPIDEMIA TYPE: ICD-10-CM

## 2025-05-28 DIAGNOSIS — R73.01 IMPAIRED FASTING GLUCOSE: Primary | ICD-10-CM

## 2025-05-28 DIAGNOSIS — R73.03 PREDIABETES: ICD-10-CM

## 2025-05-28 DIAGNOSIS — E55.9 VITAMIN D DEFICIENCY: ICD-10-CM

## 2025-05-28 PROCEDURE — 99214 OFFICE O/P EST MOD 30 MIN: CPT | Performed by: FAMILY MEDICINE

## 2025-05-28 PROCEDURE — 99396 PREV VISIT EST AGE 40-64: CPT | Performed by: FAMILY MEDICINE

## 2025-05-28 RX ORDER — TIRZEPATIDE 2.5 MG/.5ML
2.5 INJECTION, SOLUTION SUBCUTANEOUS WEEKLY
Qty: 2 ML | Refills: 0 | Status: SHIPPED | OUTPATIENT
Start: 2025-05-28 | End: 2025-06-25

## 2025-05-29 NOTE — PROGRESS NOTES
Name: Jessica Banda      : 1970      MRN: [id number]  Encounter Provider: Yvon Gan MD  Encounter Date: 2025   Encounter department: [facility] PRIMARY CARE  :  Assessment & Plan  Impaired fasting glucose  Begin trial of zepbound. Check labs. Discussed supportive care and return parameters.   Orders:    tirzepatide (Zepbound) 2.5 mg/0.5 mL auto-injector; Inject 0.5 mL (2.5 mg total) under the skin once a week for 28 days    CBC and differential; Future    Comprehensive metabolic panel; Future    TSH, 3rd generation with Free T4 reflex; Future    Lipid Panel with Direct LDL reflex; Future    Hemoglobin A1C; Future    Vitamin D 25 hydroxy; Future    Magnesium; Future    Phosphorus; Future    Vitamin B12; Future    Severe obesity (BMI 35.0-39.9) with comorbidity (HCC)  Prior Authorization Clinical Questions for Weight Management Pharmacotherapy    2. Does the patient have a diagnosis of obesity, confirmed by a BMI greater than or equal to 30 kg/m^2?: Yes  3. Does the patient have a BMI of greater than or equal to 27 kg/m^2 with at least one weight-related comorbidity/risk factor/complication (e.g. diabetes, dyslipidemia, coronary artery disease)?: Yes  4. Weight-related co-morbidities/risk factors: prediabetes, metabolic syndrome, dyslipidemia  7. Has the patient been on a weight loss regimen of low-calorie diet, increased physical activity, and lifestyle modifications for a minimum of 6 months?: Yes  8. Has the patient completed a comprehensive weight loss program (ie, Weight Watchers, Noom, Bariatrics, other rosalva on phone)? If so, what?: Yes  9. Does the patient have a history of type 2 diabetes?: No  10. Has the member tried and failed other weight loss medication within the past 12 months?: No  11. Will the member use requested medication in combination with another GLP agonist or weight loss drug?: No  12. Is the medication a controlled substance?: No     Baseline weight (in  pounds): 213.2 lbs  Current weight (in pounds): 213.2 lbs  Weight loss percentage: 0%       Begin trial of zepbound. Check labs. Discussed supportive care and return parameters.   Orders:    tirzepatide (Zepbound) 2.5 mg/0.5 mL auto-injector; Inject 0.5 mL (2.5 mg total) under the skin once a week for 28 days    CBC and differential; Future    Comprehensive metabolic panel; Future    TSH, 3rd generation with Free T4 reflex; Future    Lipid Panel with Direct LDL reflex; Future    Hemoglobin A1C; Future    Vitamin D 25 hydroxy; Future    Magnesium; Future    Phosphorus; Future    Vitamin B12; Future    Prediabetes  Begin trial of zepbound. Check labs. Discussed supportive care and return parameters.   Orders:    tirzepatide (Zepbound) 2.5 mg/0.5 mL auto-injector; Inject 0.5 mL (2.5 mg total) under the skin once a week for 28 days    CBC and differential; Future    Comprehensive metabolic panel; Future    TSH, 3rd generation with Free T4 reflex; Future    Lipid Panel with Direct LDL reflex; Future    Hemoglobin A1C; Future    Vitamin D 25 hydroxy; Future    Magnesium; Future    Phosphorus; Future    Vitamin B12; Future    Hyperlipidemia, unspecified hyperlipidemia type  Begin trial of zepbound. Check labs. Discussed supportive care and return parameters.   Orders:    tirzepatide (Zepbound) 2.5 mg/0.5 mL auto-injector; Inject 0.5 mL (2.5 mg total) under the skin once a week for 28 days    CBC and differential; Future    Comprehensive metabolic panel; Future    TSH, 3rd generation with Free T4 reflex; Future    Lipid Panel with Direct LDL reflex; Future    Hemoglobin A1C; Future    Vitamin D 25 hydroxy; Future    Magnesium; Future    Phosphorus; Future    Vitamin B12; Future    Vitamin D deficiency  Begin trial of zepbound. Check labs. Discussed supportive care and return parameters.   Orders:    CBC and differential; Future    Comprehensive metabolic panel; Future    TSH, 3rd generation with Free T4 reflex; Future     "Lipid Panel with Direct LDL reflex; Future    Hemoglobin A1C; Future    Vitamin D 25 hydroxy; Future    Magnesium; Future    Phosphorus; Future    Vitamin B12; Future    Vitamin B12 deficiency  Begin trial of zepbound. Check labs. Discussed supportive care and return parameters.   Orders:    CBC and differential; Future    Comprehensive metabolic panel; Future    TSH, 3rd generation with Free T4 reflex; Future    Lipid Panel with Direct LDL reflex; Future    Hemoglobin A1C; Future    Vitamin D 25 hydroxy; Future    Magnesium; Future    Phosphorus; Future    Vitamin B12; Future    Routine adult health maintenance  Annual well visit. Discussed various safety and health maintenance issues including healthy diet like the Mediterranean diet, exercise, ample sleep, stress reduction, and healthy weight as tolerated. Discussed supportive care and return parameters.               History of Present Illness   Patient is a 53 y/o woman who presents for follow-up on IFG, HLD, vit d and b12 def., severe obesity with comorbidity. Pt admits wanting some extra help with the weight no fevers chills nausea or vomiting. Pt also here for annual well visit admits being active eats and sleeps well.      Review of Systems   Constitutional: Negative.    HENT: Negative.     Eyes: Negative.    Respiratory: Negative.     Cardiovascular: Negative.    Gastrointestinal: Negative.    Endocrine: Negative.    Genitourinary: Negative.    Musculoskeletal: Negative.    Allergic/Immunologic: Negative.    Neurological: Negative.    Hematological: Negative.    Psychiatric/Behavioral: Negative.     All other systems reviewed and are negative.      Objective   /80 (BP Location: Left arm, Patient Position: Sitting, Cuff Size: Large)   Pulse 88   Temp 98.3 °F (36.8 °C) (Temporal)   Ht 5' 3\" (1.6 m)   Wt 96.7 kg (213 lb 3.2 oz)   LMP 01/12/2018 (Exact Date) Comment: post menopausal x 5 yrs.  SpO2 99%   BMI 37.77 kg/m²      Physical " Exam  Constitutional:       General: She is not in acute distress.     Appearance: She is well-developed. She is not diaphoretic.   HENT:      Head: Normocephalic and atraumatic.      Right Ear: External ear normal.      Left Ear: External ear normal.      Nose: Nose normal.      Mouth/Throat:      Pharynx: No oropharyngeal exudate.     Eyes:      General:         Right eye: No discharge.         Left eye: No discharge.      Conjunctiva/sclera: Conjunctivae normal.      Pupils: Pupils are equal, round, and reactive to light.     Neck:      Thyroid: No thyromegaly.      Trachea: No tracheal deviation.     Cardiovascular:      Rate and Rhythm: Normal rate and regular rhythm.      Heart sounds: Normal heart sounds. No murmur heard.     No friction rub. No gallop.   Pulmonary:      Effort: Pulmonary effort is normal. No respiratory distress.      Breath sounds: Normal breath sounds.   Abdominal:      General: There is no distension.      Palpations: Abdomen is soft.      Tenderness: There is no abdominal tenderness. There is no guarding or rebound.     Musculoskeletal:         General: Normal range of motion.   Lymphadenopathy:      Cervical: No cervical adenopathy.     Skin:     General: Skin is warm.     Neurological:      Mental Status: She is alert and oriented to person, place, and time.      Cranial Nerves: No cranial nerve deficit.     Psychiatric:         Behavior: Behavior normal.         Thought Content: Thought content normal.         Judgment: Judgment normal.

## 2025-05-29 NOTE — ASSESSMENT & PLAN NOTE
Begin trial of zepbound. Check labs. Discussed supportive care and return parameters.   Orders:    tirzepatide (Zepbound) 2.5 mg/0.5 mL auto-injector; Inject 0.5 mL (2.5 mg total) under the skin once a week for 28 days    CBC and differential; Future    Comprehensive metabolic panel; Future    TSH, 3rd generation with Free T4 reflex; Future    Lipid Panel with Direct LDL reflex; Future    Hemoglobin A1C; Future    Vitamin D 25 hydroxy; Future    Magnesium; Future    Phosphorus; Future    Vitamin B12; Future

## 2025-05-29 NOTE — ASSESSMENT & PLAN NOTE
Begin trial of zepbound. Check labs. Discussed supportive care and return parameters.   Orders:    CBC and differential; Future    Comprehensive metabolic panel; Future    TSH, 3rd generation with Free T4 reflex; Future    Lipid Panel with Direct LDL reflex; Future    Hemoglobin A1C; Future    Vitamin D 25 hydroxy; Future    Magnesium; Future    Phosphorus; Future    Vitamin B12; Future

## 2025-05-29 NOTE — ASSESSMENT & PLAN NOTE
Prior Authorization Clinical Questions for Weight Management Pharmacotherapy    2. Does the patient have a diagnosis of obesity, confirmed by a BMI greater than or equal to 30 kg/m^2?: Yes  3. Does the patient have a BMI of greater than or equal to 27 kg/m^2 with at least one weight-related comorbidity/risk factor/complication (e.g. diabetes, dyslipidemia, coronary artery disease)?: Yes  4. Weight-related co-morbidities/risk factors: prediabetes, metabolic syndrome, dyslipidemia  7. Has the patient been on a weight loss regimen of low-calorie diet, increased physical activity, and lifestyle modifications for a minimum of 6 months?: Yes  8. Has the patient completed a comprehensive weight loss program (ie, Weight Watchers, Noom, Bariatrics, other rosalva on phone)? If so, what?: Yes  9. Does the patient have a history of type 2 diabetes?: No  10. Has the member tried and failed other weight loss medication within the past 12 months?: No  11. Will the member use requested medication in combination with another GLP agonist or weight loss drug?: No  12. Is the medication a controlled substance?: No     Baseline weight (in pounds): 213.2 lbs  Current weight (in pounds): 213.2 lbs  Weight loss percentage: 0%       Begin trial of zepbound. Check labs. Discussed supportive care and return parameters.   Orders:    tirzepatide (Zepbound) 2.5 mg/0.5 mL auto-injector; Inject 0.5 mL (2.5 mg total) under the skin once a week for 28 days    CBC and differential; Future    Comprehensive metabolic panel; Future    TSH, 3rd generation with Free T4 reflex; Future    Lipid Panel with Direct LDL reflex; Future    Hemoglobin A1C; Future    Vitamin D 25 hydroxy; Future    Magnesium; Future    Phosphorus; Future    Vitamin B12; Future

## 2025-05-30 ENCOUNTER — APPOINTMENT (OUTPATIENT)
Dept: PHYSICAL THERAPY | Facility: MEDICAL CENTER | Age: 55
End: 2025-05-30
Attending: ORTHOPAEDIC SURGERY
Payer: OTHER GOVERNMENT

## 2025-05-30 ENCOUNTER — TELEPHONE (OUTPATIENT)
Age: 55
End: 2025-05-30

## 2025-05-30 NOTE — TELEPHONE ENCOUNTER
PA for     tirzepatide (Zepbound) 2.5 mg/0.5 mL auto-injector   SUBMITTED to [employer]    via    []CMM-KEY:    [x][employer]-Case ID # [id number]   []Availity-Auth ID #  NDC #    []Faxed to plan   []Other website    []Phone call Case ID #      [x]PA sent as URGENT    All office notes, labs and other pertaining documents and studies sent. Clinical questions answered. Awaiting determination from insurance company.     Turnaround time for your insurance to make a decision on your Prior Authorization can take 7-21 business days.

## 2025-05-30 NOTE — TELEPHONE ENCOUNTER
Auth needed for Zepbound 2.5mg/0.5ml. Inject 2.5mg once per week. Dispense 2ml. Dx: R73.01, E66.01, R73.03, E78.5.    T/F: Diet and exercise  Starting BMI: 37.77, Wt: 213lb, Ht: 63in  Hx: Sleep apnea, dx prior to 2016 (no record of sleep study available), hyperlipids, prediabetes.    CMM Key: [id number]  Prescribed by Dr Gan

## 2025-06-02 DIAGNOSIS — E66.9 OBESITY (BMI 30-39.9): Primary | ICD-10-CM

## 2025-06-02 NOTE — TELEPHONE ENCOUNTER
Spoke to patient directly advised her about denial of Zepound. I advised her we placed referral to weight management. She intially said there was 3 other pills that she said her insurance would pay for. I advised her I would ask Dr. Choi if he would be willing to try that. Pt declined that option and will try weight management.

## 2025-06-02 NOTE — TELEPHONE ENCOUNTER
PA for tirzepatide (Zepbound) 2.5 mg/0.5 mL auto-injector DENIED    Reason:(Screenshot if applicable)      Message sent to office clinical pool Yes    Denial letter scanned into Media Yes    We can gladly do an appeal but the process can take about 30-60 days to provide determination. Please have the office staff schedule a Peer to Peer at phone  . If an appeal is truly warranted please have Provider send clinical documentation to the PA department to support the appeal.     **Please follow up with your patient regarding denial and next steps**

## 2025-06-03 ENCOUNTER — TELEPHONE (OUTPATIENT)
Age: 55
End: 2025-06-03

## 2025-06-03 DIAGNOSIS — E66.812 CLASS 2 OBESITY DUE TO EXCESS CALORIES WITHOUT SERIOUS COMORBIDITY WITH BODY MASS INDEX (BMI) OF 37.0 TO 37.9 IN ADULT: Primary | ICD-10-CM

## 2025-06-03 DIAGNOSIS — E66.09 CLASS 2 OBESITY DUE TO EXCESS CALORIES WITHOUT SERIOUS COMORBIDITY WITH BODY MASS INDEX (BMI) OF 37.0 TO 37.9 IN ADULT: Primary | ICD-10-CM

## 2025-06-03 RX ORDER — PHENTERMINE HYDROCHLORIDE 37.5 MG/1
37.5 TABLET ORAL
Qty: 30 TABLET | Refills: 0 | Status: SHIPPED | OUTPATIENT
Start: 2025-06-03

## 2025-06-03 NOTE — TELEPHONE ENCOUNTER
She was just prescriped phentermine from dr. Ayala to try, recommend that she also discuss with her cardiologist to make sure they are ok with her trying it.

## 2025-06-23 DIAGNOSIS — I47.29 NSVT (NONSUSTAINED VENTRICULAR TACHYCARDIA) (HCC): ICD-10-CM

## 2025-06-24 RX ORDER — DILTIAZEM HYDROCHLORIDE 30 MG/1
30 TABLET, FILM COATED ORAL 2 TIMES DAILY
Qty: 180 TABLET | Refills: 0 | Status: SHIPPED | OUTPATIENT
Start: 2025-06-24

## 2025-06-25 ENCOUNTER — TELEPHONE (OUTPATIENT)
Age: 55
End: 2025-06-25

## 2025-06-25 NOTE — TELEPHONE ENCOUNTER
Received call from Patient for New Patient - Rash that scabs, SOC on back. Scheduled 2/19/26 8:00 am Sumit Morales. Verified insurance, provided [facility] addr.     Patient verbalized understanding and she will call back to check for cancellations.

## 2025-06-28 PROBLEM — Z00.00 ROUTINE ADULT HEALTH MAINTENANCE: Status: RESOLVED | Noted: 2023-05-26 | Resolved: 2025-06-28

## 2025-07-03 ENCOUNTER — TELEPHONE (OUTPATIENT)
Age: 55
End: 2025-07-03

## 2025-07-03 DIAGNOSIS — G47.33 OSA (OBSTRUCTIVE SLEEP APNEA): Primary | ICD-10-CM

## 2025-07-03 NOTE — TELEPHONE ENCOUNTER
Jessica Solo is currently using and benefits from the cpap machine. Her current setting is 13. The new machine she should receive is the [employer] Air Sense 11 w/modem.      ordered

## 2025-07-11 ENCOUNTER — TELEPHONE (OUTPATIENT)
Age: 55
End: 2025-07-11

## 2025-07-11 DIAGNOSIS — E66.812 CLASS 2 OBESITY DUE TO EXCESS CALORIES WITHOUT SERIOUS COMORBIDITY WITH BODY MASS INDEX (BMI) OF 37.0 TO 37.9 IN ADULT: ICD-10-CM

## 2025-07-11 DIAGNOSIS — E66.09 CLASS 2 OBESITY DUE TO EXCESS CALORIES WITHOUT SERIOUS COMORBIDITY WITH BODY MASS INDEX (BMI) OF 37.0 TO 37.9 IN ADULT: ICD-10-CM

## 2025-07-11 RX ORDER — PHENTERMINE HYDROCHLORIDE 37.5 MG/1
37.5 TABLET ORAL
Qty: 30 TABLET | Refills: 0 | Status: SHIPPED | OUTPATIENT
Start: 2025-07-11

## 2025-07-14 ENCOUNTER — NURSE TRIAGE (OUTPATIENT)
Age: 55
End: 2025-07-14

## 2025-07-14 NOTE — TELEPHONE ENCOUNTER
"REASON FOR CONVERSATION: Rash    SYMPTOMS: Rash in bilateral folds of bikini line area, rash traveling up towards vaginal opening. 7/10 burning pain and itching in area of rash. Symptoms present for 2 weeks.     OTHER HEALTH INFORMATION: Patient believes sweating has caused rash to form in bikini line area. She has tried several OTC creams and powders - vagisil, jock itch cream, talc free powders. Patient requesting Dr. Allen send medication to pharmacy for rash. Denies any vaginal discharge or itching.     PROTOCOL DISPOSITION: Discuss with Provider and Call Back Patient (overriding See Within 2 Weeks in Office)    CARE ADVICE PROVIDED: Keep area dry, wear loose fitting clothing, cotton underwear. Call back if worsening symptoms.     PRACTICE FOLLOW-UP: None      Reason for Disposition   Red, moist, irritated area between skin folds (or under larger breasts)    Answer Assessment - Initial Assessment Questions  1. APPEARANCE of RASH: \"Describe the rash.\"       Redness along bikini line traveling towards bottom of vaginal opening  2. LOCATION: \"Where is the rash located?\"       In crease  3. NUMBER: \"How many spots are there?\"       Both sides  5. ONSET: \"When did the rash start?\"       2 weeks ago  6. ITCHING: \"Does the rash itch?\" If Yes, ask: \"How bad is the itch?\"  (Scale 0-10; or none, mild, moderate, severe)      Itching 7/10  7. PAIN: \"Does the rash hurt?\" If Yes, ask: \"How bad is the pain?\"  (Scale 0-10; or none, mild, moderate, severe)      Burning and painful to touch 7/10  8. OTHER SYMPTOMS: \"Do you have any other symptoms?\" (e.g., fever)      Denies fevers or chills, vaginal discharge or itching of the vagina    Patient has tried vagisil, jock itch cream, Talc free medicated powder, Lady antifriction body powder with calamine for soothing releif.   Patient thinks from sweating and hot weather.  Umderwear seems to make it worse    Protocols used: Rash or Redness - Localized-Adult-[place]    "

## 2025-07-21 ENCOUNTER — TELEPHONE (OUTPATIENT)
Dept: BARIATRICS | Facility: CLINIC | Age: 55
End: 2025-07-21

## 2025-07-21 NOTE — TELEPHONE ENCOUNTER
Called patient and advised per Dr. Allen if she still has symptoms, an exam is recommended. She states symptoms have worsened and rash under her breast is very painful. Attempted to schedule appointment. Offered appointment, tomorrow, 7/21, however patient unable to make them due to time. Offered appointment Wednesday 7/22 patient unable to make it due to location. Marychuy transferred call to Claudio in office to assist patient in finding appointment.

## 2025-07-22 ENCOUNTER — OFFICE VISIT (OUTPATIENT)
Age: 55
End: 2025-07-22
Payer: OTHER GOVERNMENT

## 2025-07-22 VITALS
DIASTOLIC BLOOD PRESSURE: 80 MMHG | HEART RATE: 84 BPM | SYSTOLIC BLOOD PRESSURE: 136 MMHG | WEIGHT: 202 LBS | RESPIRATION RATE: 16 BRPM | TEMPERATURE: 98.5 F | HEIGHT: 63 IN | BODY MASS INDEX: 35.79 KG/M2 | OXYGEN SATURATION: 98 %

## 2025-07-22 DIAGNOSIS — J20.8 ACUTE BRONCHITIS DUE TO OTHER SPECIFIED ORGANISMS: Primary | ICD-10-CM

## 2025-07-22 PROCEDURE — 99213 OFFICE O/P EST LOW 20 MIN: CPT | Performed by: FAMILY MEDICINE

## 2025-07-22 RX ORDER — FLUTICASONE PROPIONATE AND SALMETEROL 250; 50 UG/1; UG/1
1 POWDER RESPIRATORY (INHALATION) 2 TIMES DAILY
Qty: 60 BLISTER | Refills: 1 | Status: SHIPPED | OUTPATIENT
Start: 2025-07-22

## 2025-07-22 RX ORDER — METHYLPREDNISOLONE 4 MG/1
TABLET ORAL
Qty: 21 EACH | Refills: 0 | Status: SHIPPED | OUTPATIENT
Start: 2025-07-22

## 2025-07-22 RX ORDER — AZITHROMYCIN 250 MG/1
TABLET, FILM COATED ORAL
Qty: 6 TABLET | Refills: 0 | Status: SHIPPED | OUTPATIENT
Start: 2025-07-22 | End: 2025-07-27

## 2025-07-22 NOTE — PROGRESS NOTES
"Name: Jessica Banda      : 1970      MRN: [id number]  Encounter Provider: Yvon Gan MD  Encounter Date: 2025   Encounter department: [facility] PRIMARY [facility]  :  Assessment & Plan  Acute bronchitis due to other specified organisms  Add Z-pack, steroids and inhaler. Discussed supportive care and return parameters.   Orders:    azithromycin (Zithromax) 250 mg tablet; Take 2 tablets (500 mg total) by mouth daily for 1 day, THEN 1 tablet (250 mg total) daily for 4 days.    methylPREDNISolone 4 MG tablet therapy pack; Use as directed on package    Fluticasone-Salmeterol (Advair Diskus) 250-50 mcg/dose inhaler; Inhale 1 puff 2 (two) times a day Rinse mouth after use.           History of Present Illness   Patient is a 54-year-old woman who presents complaining of 2 weeks of cough congestion wheezing sinus pressure sore throat and dizziness no fevers chills no nausea vomiting tolerating p.o. intake    Cough  Associated symptoms include a rash, a sore throat and wheezing.   Rash  Associated symptoms include congestion, coughing and a sore throat.     Review of Systems   Constitutional: Negative.    HENT:  Positive for congestion, sinus pressure and sore throat.    Eyes: Negative.    Respiratory:  Positive for cough and wheezing.    Cardiovascular: Negative.    Gastrointestinal: Negative.    Endocrine: Negative.    Genitourinary: Negative.    Musculoskeletal: Negative.    Skin:  Positive for rash.   Allergic/Immunologic: Negative.    Neurological:  Positive for dizziness.   Hematological: Negative.    Psychiatric/Behavioral: Negative.     All other systems reviewed and are negative.      Objective   /80 (BP Location: Left arm, Patient Position: Sitting, Cuff Size: Large)   Pulse 84   Temp 98.5 °F (36.9 °C) (Temporal)   Resp 16   Ht 5' 3\" (1.6 m)   Wt 91.6 kg (202 lb)   LMP 2018 (Exact Date) Comment: post menopausal x 5 yrs.  SpO2 98%   BMI 35.78 kg/m²      Physical " Exam  Constitutional:       General: She is not in acute distress.     Appearance: She is well-developed. She is not diaphoretic.   HENT:      Head: Normocephalic and atraumatic.      Right Ear: External ear normal.      Left Ear: External ear normal.      Nose: Nose normal.      Mouth/Throat:      Pharynx: No oropharyngeal exudate.     Eyes:      General:         Right eye: No discharge.         Left eye: No discharge.      Conjunctiva/sclera: Conjunctivae normal.      Pupils: Pupils are equal, round, and reactive to light.     Neck:      Thyroid: No thyromegaly.      Trachea: No tracheal deviation.     Cardiovascular:      Rate and Rhythm: Normal rate and regular rhythm.      Heart sounds: Normal heart sounds. No murmur heard.     No friction rub. No gallop.   Pulmonary:      Effort: Pulmonary effort is normal. No respiratory distress.      Breath sounds: Wheezing present.   Abdominal:      General: There is no distension.      Palpations: Abdomen is soft.      Tenderness: There is no abdominal tenderness. There is no guarding or rebound.     Musculoskeletal:         General: Normal range of motion.   Lymphadenopathy:      Cervical: No cervical adenopathy.     Skin:     General: Skin is warm.     Neurological:      Mental Status: She is alert and oriented to person, place, and time.      Cranial Nerves: No cranial nerve deficit.     Psychiatric:         Behavior: Behavior normal.         Thought Content: Thought content normal.         Judgment: Judgment normal.

## 2025-07-22 NOTE — ASSESSMENT & PLAN NOTE
Add Z-pack, steroids and inhaler. Discussed supportive care and return parameters.   Orders:    azithromycin (Zithromax) 250 mg tablet; Take 2 tablets (500 mg total) by mouth daily for 1 day, THEN 1 tablet (250 mg total) daily for 4 days.    methylPREDNISolone 4 MG tablet therapy pack; Use as directed on package    Fluticasone-Salmeterol (Advair Diskus) 250-50 mcg/dose inhaler; Inhale 1 puff 2 (two) times a day Rinse mouth after use.

## 2025-07-28 ENCOUNTER — APPOINTMENT (OUTPATIENT)
Dept: LAB | Facility: MEDICAL CENTER | Age: 55
End: 2025-07-28
Payer: OTHER GOVERNMENT

## 2025-07-28 DIAGNOSIS — E66.01 SEVERE OBESITY (BMI 35.0-39.9) WITH COMORBIDITY (HCC): ICD-10-CM

## 2025-07-28 DIAGNOSIS — E78.5 HYPERLIPIDEMIA, UNSPECIFIED HYPERLIPIDEMIA TYPE: ICD-10-CM

## 2025-07-28 DIAGNOSIS — R73.01 IMPAIRED FASTING GLUCOSE: ICD-10-CM

## 2025-07-28 DIAGNOSIS — E55.9 VITAMIN D DEFICIENCY: ICD-10-CM

## 2025-07-28 DIAGNOSIS — E53.8 VITAMIN B12 DEFICIENCY: ICD-10-CM

## 2025-07-28 DIAGNOSIS — R73.03 PREDIABETES: ICD-10-CM

## 2025-07-28 LAB
25(OH)D3 SERPL-MCNC: 50.1 NG/ML (ref 30–100)
ALBUMIN SERPL BCG-MCNC: 3.9 G/DL (ref 3.5–5)
ALP SERPL-CCNC: 78 U/L (ref 34–104)
ALT SERPL W P-5'-P-CCNC: 19 U/L (ref 7–52)
ANION GAP SERPL CALCULATED.3IONS-SCNC: 9 MMOL/L (ref 4–13)
AST SERPL W P-5'-P-CCNC: 15 U/L (ref 13–39)
BASOPHILS # BLD AUTO: 0.05 THOUSANDS/ÂΜL (ref 0–0.1)
BASOPHILS NFR BLD AUTO: 1 % (ref 0–1)
BILIRUB SERPL-MCNC: 0.8 MG/DL (ref 0.2–1)
BUN SERPL-MCNC: 12 MG/DL (ref 5–25)
CALCIUM SERPL-MCNC: 9.2 MG/DL (ref 8.4–10.2)
CHLORIDE SERPL-SCNC: 105 MMOL/L (ref 96–108)
CHOLEST SERPL-MCNC: 164 MG/DL (ref ?–200)
CO2 SERPL-SCNC: 27 MMOL/L (ref 21–32)
CREAT SERPL-MCNC: 0.7 MG/DL (ref 0.6–1.3)
EOSINOPHIL # BLD AUTO: 0.17 THOUSAND/ÂΜL (ref 0–0.61)
EOSINOPHIL NFR BLD AUTO: 2 % (ref 0–6)
ERYTHROCYTE [DISTWIDTH] IN BLOOD BY AUTOMATED COUNT: 12.7 % (ref 11.6–15.1)
EST. AVERAGE GLUCOSE BLD GHB EST-MCNC: 137 MG/DL
GFR SERPL CREATININE-BSD FRML MDRD: 97 ML/MIN/1.73SQ M
GLUCOSE P FAST SERPL-MCNC: 128 MG/DL (ref 65–99)
HBA1C MFR BLD: 6.4 %
HCT VFR BLD AUTO: 41.5 % (ref 34.8–46.1)
HDLC SERPL-MCNC: 62 MG/DL
HGB BLD-MCNC: 13.7 G/DL (ref 11.5–15.4)
IMM GRANULOCYTES # BLD AUTO: 0.03 THOUSAND/UL (ref 0–0.2)
IMM GRANULOCYTES NFR BLD AUTO: 0 % (ref 0–2)
LDLC SERPL CALC-MCNC: 68 MG/DL (ref 0–100)
LYMPHOCYTES # BLD AUTO: 2.16 THOUSANDS/ÂΜL (ref 0.6–4.47)
LYMPHOCYTES NFR BLD AUTO: 27 % (ref 14–44)
MAGNESIUM SERPL-MCNC: 2.2 MG/DL (ref 1.9–2.7)
MCH RBC QN AUTO: 29.8 PG (ref 26.8–34.3)
MCHC RBC AUTO-ENTMCNC: 33 G/DL (ref 31.4–37.4)
MCV RBC AUTO: 90 FL (ref 82–98)
MONOCYTES # BLD AUTO: 0.47 THOUSAND/ÂΜL (ref 0.17–1.22)
MONOCYTES NFR BLD AUTO: 6 % (ref 4–12)
NEUTROPHILS # BLD AUTO: 5.25 THOUSANDS/ÂΜL (ref 1.85–7.62)
NEUTS SEG NFR BLD AUTO: 64 % (ref 43–75)
NRBC BLD AUTO-RTO: 0 /100 WBCS
PHOSPHATE SERPL-MCNC: 4.3 MG/DL (ref 2.7–4.5)
PLATELET # BLD AUTO: 290 THOUSANDS/UL (ref 149–390)
PMV BLD AUTO: 10.7 FL (ref 8.9–12.7)
POTASSIUM SERPL-SCNC: 3.9 MMOL/L (ref 3.5–5.3)
PROT SERPL-MCNC: 7 G/DL (ref 6.4–8.4)
RBC # BLD AUTO: 4.6 MILLION/UL (ref 3.81–5.12)
SODIUM SERPL-SCNC: 141 MMOL/L (ref 135–147)
TRIGL SERPL-MCNC: 168 MG/DL (ref ?–150)
TSH SERPL DL<=0.05 MIU/L-ACNC: 2.75 UIU/ML (ref 0.45–4.5)
VIT B12 SERPL-MCNC: 287 PG/ML (ref 180–914)
WBC # BLD AUTO: 8.13 THOUSAND/UL (ref 4.31–10.16)

## 2025-07-28 PROCEDURE — 83036 HEMOGLOBIN GLYCOSYLATED A1C: CPT

## 2025-07-28 PROCEDURE — 36415 COLL VENOUS BLD VENIPUNCTURE: CPT

## 2025-07-28 PROCEDURE — 80061 LIPID PANEL: CPT

## 2025-07-28 PROCEDURE — 82306 VITAMIN D 25 HYDROXY: CPT

## 2025-07-28 PROCEDURE — 80053 COMPREHEN METABOLIC PANEL: CPT

## 2025-07-28 PROCEDURE — 83735 ASSAY OF MAGNESIUM: CPT

## 2025-07-28 PROCEDURE — 85025 COMPLETE CBC W/AUTO DIFF WBC: CPT

## 2025-07-28 PROCEDURE — 82607 VITAMIN B-12: CPT

## 2025-07-28 PROCEDURE — 84443 ASSAY THYROID STIM HORMONE: CPT

## 2025-07-28 PROCEDURE — 84100 ASSAY OF PHOSPHORUS: CPT

## 2025-07-29 ENCOUNTER — TELEPHONE (OUTPATIENT)
Age: 55
End: 2025-07-29

## 2025-07-29 ENCOUNTER — OFFICE VISIT (OUTPATIENT)
Dept: GYNECOLOGY | Facility: CLINIC | Age: 55
End: 2025-07-29
Payer: OTHER GOVERNMENT

## 2025-07-29 VITALS — DIASTOLIC BLOOD PRESSURE: 72 MMHG | SYSTOLIC BLOOD PRESSURE: 130 MMHG

## 2025-07-29 DIAGNOSIS — B37.2 CUTANEOUS CANDIDIASIS: Primary | ICD-10-CM

## 2025-07-29 PROCEDURE — 99213 OFFICE O/P EST LOW 20 MIN: CPT | Performed by: OBSTETRICS & GYNECOLOGY

## 2025-07-29 RX ORDER — CLOTRIMAZOLE AND BETAMETHASONE DIPROPIONATE 10; .64 MG/G; MG/G
CREAM TOPICAL 2 TIMES DAILY
Qty: 45 G | Refills: 1 | Status: SHIPPED | OUTPATIENT
Start: 2025-07-29 | End: 2025-08-05

## 2025-07-29 RX ORDER — NYSTATIN 100000 [USP'U]/G
POWDER TOPICAL 2 TIMES DAILY
Qty: 60 G | Refills: 1 | Status: SHIPPED | OUTPATIENT
Start: 2025-07-29 | End: 2025-08-05

## 2025-08-08 ENCOUNTER — OFFICE VISIT (OUTPATIENT)
Age: 55
End: 2025-08-08
Payer: OTHER GOVERNMENT

## 2025-08-08 ENCOUNTER — NURSE TRIAGE (OUTPATIENT)
Age: 55
End: 2025-08-08

## 2025-08-08 VITALS
WEIGHT: 199.2 LBS | OXYGEN SATURATION: 98 % | TEMPERATURE: 98.5 F | DIASTOLIC BLOOD PRESSURE: 70 MMHG | SYSTOLIC BLOOD PRESSURE: 108 MMHG | BODY MASS INDEX: 35.3 KG/M2 | HEART RATE: 82 BPM | HEIGHT: 63 IN

## 2025-08-08 DIAGNOSIS — R06.09 DOE (DYSPNEA ON EXERTION): ICD-10-CM

## 2025-08-08 DIAGNOSIS — L30.9 DERMATITIS: ICD-10-CM

## 2025-08-08 DIAGNOSIS — R22.1 MASS OF LEFT SIDE OF NECK: Primary | ICD-10-CM

## 2025-08-08 DIAGNOSIS — R07.89 OTHER CHEST PAIN: ICD-10-CM

## 2025-08-08 PROCEDURE — 99214 OFFICE O/P EST MOD 30 MIN: CPT | Performed by: FAMILY MEDICINE

## 2025-08-08 RX ORDER — TRIAMCINOLONE ACETONIDE 1 MG/G
CREAM TOPICAL 2 TIMES DAILY
Qty: 80 G | Refills: 0 | Status: SHIPPED | OUTPATIENT
Start: 2025-08-08 | End: 2025-08-08 | Stop reason: CLARIF

## 2025-08-08 RX ORDER — TRIAMCINOLONE ACETONIDE 1 MG/G
CREAM TOPICAL 2 TIMES DAILY
Qty: 80 G | Refills: 0 | Status: SHIPPED | OUTPATIENT
Start: 2025-08-08

## 2025-08-08 RX ORDER — LEVALBUTEROL TARTRATE 45 UG/1
1-2 AEROSOL, METERED ORAL EVERY 4 HOURS PRN
Qty: 15 G | Refills: 1 | Status: SHIPPED | OUTPATIENT
Start: 2025-08-08 | End: 2025-08-08 | Stop reason: CLARIF

## 2025-08-08 RX ORDER — LEVALBUTEROL TARTRATE 45 UG/1
1-2 AEROSOL, METERED ORAL EVERY 4 HOURS PRN
Qty: 15 G | Refills: 1 | Status: SHIPPED | OUTPATIENT
Start: 2025-08-08

## 2025-08-12 ENCOUNTER — TELEPHONE (OUTPATIENT)
Age: 55
End: 2025-08-12

## 2025-08-13 ENCOUNTER — TELEPHONE (OUTPATIENT)
Dept: RADIOLOGY | Facility: HOSPITAL | Age: 55
End: 2025-08-13

## 2025-08-14 ENCOUNTER — REMOTE DEVICE CLINIC VISIT (OUTPATIENT)
Dept: CARDIOLOGY CLINIC | Facility: CLINIC | Age: 55
End: 2025-08-14
Payer: OTHER GOVERNMENT

## 2025-08-21 PROBLEM — J20.8 ACUTE BRONCHITIS DUE TO OTHER SPECIFIED ORGANISMS: Status: RESOLVED | Noted: 2025-07-22 | Resolved: 2025-08-21

## 2025-08-22 ENCOUNTER — HOSPITAL ENCOUNTER (OUTPATIENT)
Dept: CT IMAGING | Facility: HOSPITAL | Age: 55
Discharge: HOME | End: 2025-08-22
Attending: FAMILY MEDICINE
Payer: OTHER GOVERNMENT

## 2025-08-22 DIAGNOSIS — R06.09 DOE (DYSPNEA ON EXERTION): ICD-10-CM

## 2025-08-22 DIAGNOSIS — R07.89 OTHER CHEST PAIN: ICD-10-CM

## 2025-08-22 DIAGNOSIS — R22.1 MASS OF LEFT SIDE OF NECK: ICD-10-CM

## 2025-08-22 PROCEDURE — 71260 CT THORAX DX C+: CPT

## 2025-08-22 PROCEDURE — 70491 CT SOFT TISSUE NECK W/DYE: CPT

## 2025-08-22 RX ADMIN — IOHEXOL 100 ML: 350 INJECTION, SOLUTION INTRAVENOUS at 19:56

## (undated) DEVICE — DGW .035 FC J3MM 260CM TEF: Brand: EMERALD

## (undated) DEVICE — GLIDESHEATH BASIC HYDROPHILIC COATED INTRODUCER SHEATH: Brand: GLIDESHEATH

## (undated) DEVICE — THE VASC BAND HEMOSTAT IS A COMPRESSION DEVICE TO ASSIST HEMOSTASIS OF ARTERIAL, VENOUS AND HEMODIALYSIS PERCUTANEOUS ACCESS SITES.: Brand: VASC BAND™ HEMOSTAT

## (undated) DEVICE — CATH F4 INF JR 4 100CM: Brand: INFINITI

## (undated) DEVICE — Device: Brand: ASAHI SILVERWAY

## (undated) DEVICE — RADIFOCUS OPTITORQUE ANGIOGRAPHIC CATHETER: Brand: OPTITORQUE